# Patient Record
Sex: FEMALE | Race: WHITE | Employment: OTHER | URBAN - METROPOLITAN AREA
[De-identification: names, ages, dates, MRNs, and addresses within clinical notes are randomized per-mention and may not be internally consistent; named-entity substitution may affect disease eponyms.]

---

## 2017-06-15 ENCOUNTER — TRANSFERRED RECORDS (OUTPATIENT)
Dept: HEALTH INFORMATION MANAGEMENT | Facility: CLINIC | Age: 67
End: 2017-06-15

## 2017-06-16 ENCOUNTER — RADIANT APPOINTMENT (OUTPATIENT)
Dept: GENERAL RADIOLOGY | Facility: CLINIC | Age: 67
End: 2017-06-16
Attending: FAMILY MEDICINE
Payer: COMMERCIAL

## 2017-06-16 ENCOUNTER — OFFICE VISIT (OUTPATIENT)
Dept: FAMILY MEDICINE | Facility: CLINIC | Age: 67
End: 2017-06-16
Payer: COMMERCIAL

## 2017-06-16 VITALS
BODY MASS INDEX: 33.63 KG/M2 | HEART RATE: 72 BPM | DIASTOLIC BLOOD PRESSURE: 78 MMHG | TEMPERATURE: 98 F | OXYGEN SATURATION: 96 % | HEIGHT: 64 IN | WEIGHT: 197 LBS | SYSTOLIC BLOOD PRESSURE: 120 MMHG

## 2017-06-16 VITALS
TEMPERATURE: 98 F | HEIGHT: 64 IN | SYSTOLIC BLOOD PRESSURE: 120 MMHG | DIASTOLIC BLOOD PRESSURE: 78 MMHG | OXYGEN SATURATION: 96 % | BODY MASS INDEX: 33.63 KG/M2 | WEIGHT: 197 LBS | HEART RATE: 72 BPM

## 2017-06-16 DIAGNOSIS — E55.9 VITAMIN D DEFICIENCY: ICD-10-CM

## 2017-06-16 DIAGNOSIS — M54.2 CERVICALGIA: ICD-10-CM

## 2017-06-16 DIAGNOSIS — Z00.00 MEDICARE ANNUAL WELLNESS VISIT, SUBSEQUENT: Primary | ICD-10-CM

## 2017-06-16 DIAGNOSIS — C54.1 ENDOMETRIAL CANCER (H): ICD-10-CM

## 2017-06-16 DIAGNOSIS — E78.5 HYPERLIPIDEMIA LDL GOAL <130: ICD-10-CM

## 2017-06-16 DIAGNOSIS — M54.2 CERVICALGIA: Primary | ICD-10-CM

## 2017-06-16 LAB
ALBUMIN SERPL-MCNC: 3.7 G/DL (ref 3.4–5)
ALP SERPL-CCNC: 46 U/L (ref 40–150)
ALT SERPL W P-5'-P-CCNC: 25 U/L (ref 0–50)
ANION GAP SERPL CALCULATED.3IONS-SCNC: 7 MMOL/L (ref 3–14)
AST SERPL W P-5'-P-CCNC: 19 U/L (ref 0–45)
BILIRUB SERPL-MCNC: 0.4 MG/DL (ref 0.2–1.3)
BUN SERPL-MCNC: 17 MG/DL (ref 7–30)
CALCIUM SERPL-MCNC: 9.1 MG/DL (ref 8.5–10.1)
CHLORIDE SERPL-SCNC: 109 MMOL/L (ref 94–109)
CO2 SERPL-SCNC: 27 MMOL/L (ref 20–32)
CREAT SERPL-MCNC: 0.62 MG/DL (ref 0.52–1.04)
DEPRECATED CALCIDIOL+CALCIFEROL SERPL-MC: 36 UG/L (ref 20–75)
ERYTHROCYTE [DISTWIDTH] IN BLOOD BY AUTOMATED COUNT: 15.7 % (ref 10–15)
GFR SERPL CREATININE-BSD FRML MDRD: ABNORMAL ML/MIN/1.7M2
GLUCOSE SERPL-MCNC: 107 MG/DL (ref 70–99)
HCT VFR BLD AUTO: 42.7 % (ref 35–47)
HGB BLD-MCNC: 13.5 G/DL (ref 11.7–15.7)
MCH RBC QN AUTO: 29.7 PG (ref 26.5–33)
MCHC RBC AUTO-ENTMCNC: 31.6 G/DL (ref 31.5–36.5)
MCV RBC AUTO: 94 FL (ref 78–100)
PLATELET # BLD AUTO: 283 10E9/L (ref 150–450)
POTASSIUM SERPL-SCNC: 4.5 MMOL/L (ref 3.4–5.3)
PROT SERPL-MCNC: 7.5 G/DL (ref 6.8–8.8)
RBC # BLD AUTO: 4.55 10E12/L (ref 3.8–5.2)
SODIUM SERPL-SCNC: 143 MMOL/L (ref 133–144)
WBC # BLD AUTO: 6 10E9/L (ref 4–11)

## 2017-06-16 PROCEDURE — 80061 LIPID PANEL: CPT | Performed by: FAMILY MEDICINE

## 2017-06-16 PROCEDURE — 72040 X-RAY EXAM NECK SPINE 2-3 VW: CPT

## 2017-06-16 PROCEDURE — 85027 COMPLETE CBC AUTOMATED: CPT | Performed by: FAMILY MEDICINE

## 2017-06-16 PROCEDURE — 99397 PER PM REEVAL EST PAT 65+ YR: CPT | Performed by: FAMILY MEDICINE

## 2017-06-16 PROCEDURE — 80053 COMPREHEN METABOLIC PANEL: CPT | Performed by: FAMILY MEDICINE

## 2017-06-16 PROCEDURE — 2894A XR CERVICAL SPINE 1 VW: CPT

## 2017-06-16 PROCEDURE — 36415 COLL VENOUS BLD VENIPUNCTURE: CPT | Performed by: FAMILY MEDICINE

## 2017-06-16 PROCEDURE — 82306 VITAMIN D 25 HYDROXY: CPT | Performed by: FAMILY MEDICINE

## 2017-06-16 PROCEDURE — 99213 OFFICE O/P EST LOW 20 MIN: CPT | Performed by: FAMILY MEDICINE

## 2017-06-16 NOTE — MR AVS SNAPSHOT
After Visit Summary   6/16/2017    Blanquita Trevizo    MRN: [id number]           Patient Information     Date Of Birth          1950        Visit Information        Provider Department      6/16/2017 8:40 AM Weiler, Karen, MD [facility]        Today's Diagnoses     Cervicalgia    -  1       Follow-ups after your visit        Additional Services     [facility]IRLEY PT, HAND, AND CHIROPRACTIC REFERRAL       **This order will print in the [facility] Scheduling Office**    Physical Therapy, Hand Therapy and Chiropractic Care are available through:    *[facility]  *[facility]  *[facility]    Call one number to schedule at any of the above locations: [phone].    Your provider has referred you to: Physical Therapy at [facility] or [facility]    Indication/Reason for Referral: Neck pain and Rt. Achilles tendinitis  Onset of Illness: Several months  Therapy Orders: Evaluate and Treat  Special Programs: None  Special Request: None    Jose Miguel Gillespie      Additional Comments for the Therapist or Chiropractor:     Please be aware that coverage of these services is subject to the terms and limitations of your health insurance plan.  Call member services at your health plan with any benefit or coverage questions.      Please bring the following to your appointment:    *Your personal calendar for scheduling future appointments  *Comfortable clothing                  Who to contact     If you have questions or need follow up information about today's clinic visit or your schedule please contact [facility] directly at [phone].  Normal or non-critical lab and imaging results will be communicated to you by MyChart, letter or phone within 4 business days after the clinic has received the results. If you do not hear from us within 7 days, please contact the clinic through MyChart or phone. If you have a critical or abnormal lab result, we will notify you by  "phone as soon as possible.  Submit refill requests through [employer] or call your pharmacy and they will forward the refill request to us. Please allow 3 business days for your refill to be completed.          Additional Information About Your Visit        RailRunner[employer]orm Information     [employer] gives you secure access to your electronic health record. If you see a primary care provider, you can also send messages to your care team and make appointments. If you have questions, please call your primary care clinic.  If you do not have a primary care provider, please call [phone] and they will assist you.        Care EveryWhere ID     This is your Care EveryWhere ID. This could be used by other organizations to access your [place] medical records  [id number]        Your Vitals Were     Pulse Temperature Height Pulse Oximetry BMI (Body Mass Index)       72 98  F (36.7  C) (Oral) 5' 4\" (1.626 m) 96% 33.81 kg/m2        Blood Pressure from Last 3 Encounters:   06/16/17 120/78   06/16/17 120/78   11/28/16 128/80    Weight from Last 3 Encounters:   06/16/17 197 lb (89.4 kg)   06/16/17 197 lb (89.4 kg)   11/28/16 202 lb (91.6 kg)              We Performed the Following     SHIRLEY PT, HAND, AND CHIROPRACTIC REFERRAL        Primary Care Provider Office Phone # Fax #    Karen Weiler, [phone]       [address] [address]        Equal Access to Services     Kern ValleyMAJOR : Hadii aad ku hadasho Soomaali, waaxda luqadaha, qaybta kaalmada adeegyada, waxay priyanka rust RegaloCardmonica robert . So [facility] [phone].    ATENCIÓN: Si habla español, tiene a lou disposición servicios gratuitos de asistencia lingüística. Heber al [phone].    We comply with applicable federal civil rights laws and [place] laws. We do not discriminate on the basis of race, color, national origin, age, disability sex, sexual orientation or gender identity.            Thank you!     Thank you for choosing " [facility]  for your care. Our goal is always to provide you with excellent care. Hearing back from our patients is one way we can continue to improve our services. Please take a few minutes to complete the written survey that you may receive in the mail after your visit with us. Thank you!             Your Updated Medication List - Protect others around you: Learn how to safely use, store and throw away your medicines at www.disposemymeds.org.          This list is accurate as of: 6/16/17 11:59 PM.  Always use your most recent med list.                   Brand Name Dispense Instructions for use Diagnosis    calcium 500 + D 500-400 MG-UNIT Tabs   Generic drug:  Calcium Carb-Cholecalciferol           clobetasol 0.05 % ointment    TEMOVATE    30 g    Apply to rash on feet two times per day for 10 days then once per day.  Do not apply to face.    Dermatitis       vitamin E 400 UNIT capsule     30 capsule    Strength unknown at this time - patient uses what is cost effective.
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
## 2017-06-16 NOTE — PROGRESS NOTES
SUBJECTIVE:                                                            Blanquita Trevizo is a 66 year old female who presents for Preventive Visit.    Are you in the first 12 months of your Medicare Part B coverage?  No    Pt reports she is doing well. Pt reports she sleeps well, but wishes her energy was higher, stays up late playing Bridge. Pt saw cancer doctor yesterday and everything was normal, was told to see a gynecologist. Pt takes 2 gummy bears each day for vitamin D and fish oil each day as well. No chest pain or SOB, nonsmoker, no palpitations or abdominal pain. Pt has had no falls and is able to take care of herself at home.      Healthy Habits:    Do you get at least three servings of calcium containing foods daily (dairy, green leafy vegetables, etc.)? yes and taking vitamin D     Amount of exercise or daily activities, outside of work: Swimming sometimes - 40 -45 minutes per session     Problems taking medications regularly No    Medication side effects: No    Have you had an eye exam in the past two years? yes    Do you see a dentist twice per year? yes    Do you have sleep apnea, excessive snoring or daytime drowsiness?no    COGNITIVE SCREEN  1) Repeat 3 items (Banana, Sunrise, Chair)    2) Clock draw: NORMAL  3) 3 item recall: Recalls 3 objects  Results: 3 items recalled: COGNITIVE IMPAIRMENT LESS LIKELY    Mini-CogTM Copyright NEELIMA Madrid. Licensed by the author for use in [facility]; reprinted with permission (andrew@.[facility]). All rights reserved.        Reviewed and updated as needed this visit by clinical staff  Tobacco  Allergies  Meds  Med Hx  Surg Hx  Fam Hx  Soc Hx        Reviewed and updated as needed this visit by Provider        Social History   Substance Use Topics     Smoking status: Never Smoker     Smokeless tobacco: Never Used     Alcohol use Yes      Comment: 2-3 drinks per week        The patient does not drink >3 drinks per day nor >7 drinks per week.    Today's  PHQ-2 Score:   PHQ-2 ( 1999 [employer]) 11/28/2016 9/19/2016   Q1: Little interest or pleasure in doing things 0 0   Q2: Feeling down, depressed or hopeless 0 0   PHQ-2 Score 0 0       Do you feel safe in your environment - Yes    Do you have a Health Care Directive?: No: Advance care planning was reviewed with patient; patient declined at this time.    Current providers sharing in care for this patient include:   Patient Care Team:  Weiler, Karen, MD as PCP - General      Hearing impairment: No    Ability to successfully perform activities of daily living: Yes, no assistance needed     Fall risk:       Home safety:  none identified      The following health maintenance items are reviewed in Epic and correct as of today:  Health Maintenance   Topic Date Due     ADVANCE DIRECTIVE PLANNING Q5 YRS  11/30/2016     INFLUENZA VACCINE (SYSTEM ASSIGNED)  09/01/2017     DEXA Q2 YR  09/02/2017     PNEUMOCOCCAL (2 of 2 - PPSV23) 09/08/2017     MAMMO Q1 YR  09/15/2017     FALL RISK ASSESSMENT  09/19/2017     TETANUS Q10 YR  06/24/2018     LIPID MONITORING Q5 YEARS  09/08/2021     COLONOSCOPY Q10 YR  08/25/2026     HEPATITIS C SCREENING  Completed       Mammogram Screening: Patient over age 50, mutual decision to screen reflected in health maintenance.     ROS:  Constitutional, HEENT, cardiovascular, pulmonary, gi and gu systems are negative, except as otherwise noted.    This document serves as a record of the services and decisions personally performed and made by Karen Weiler, MD. It was created on her behalf by Chapito Mills, a trained medical scribe. The creation of this document is based on the provider's statements to the medical scribe.  Chapito Mills 9:45 AM June 16, 2017    Problem list, Medication list, Allergies, and Medical/Social/Surgical histories reviewed in [facility] and updated as appropriate.  OBJECTIVE:                                                            /78  Pulse 72  Temp 98  F (36.7  C) (Oral)  Ht  "1.626 m (5' 4\")  Wt 89.4 kg (197 lb)  SpO2 96%  BMI 33.81 kg/m2 Estimated body mass index is 33.81 kg/(m^2) as calculated from the following:    Height as of this encounter: 1.626 m (5' 4\").    Weight as of this encounter: 89.4 kg (197 lb).  EXAM:   GENERAL APPEARANCE: healthy, alert and no distress  EYES: Eyes grossly normal to inspection, PERRL and conjunctivae and sclerae normal  HENT: ear canals and TM's normal, nose and mouth without ulcers or lesions, oropharynx clear and oral mucous membranes moist  NECK: no adenopathy, no asymmetry, masses, or scars and thyroid normal to palpation  RESP: lungs clear to auscultation - no rales, rhonchi or wheezes  BREAST: normal without masses, tenderness or nipple discharge and no palpable axillary masses or adenopathy  CV: regular rate and rhythm, normal S1 S2, no S3 or S4, no murmur, click or rub, no peripheral edema and peripheral pulses strong  ABDOMEN: soft, nontender, no hepatosplenomegaly, no masses and bowel sounds normal  MS: no musculoskeletal defects are noted and gait is age appropriate without ataxia  SKIN: no suspicious lesions or rashes  NEURO: Normal strength and tone, sensory exam grossly normal, mentation intact and speech normal  PSYCH: mentation appears normal and affect normal/bright    ASSESSMENT / PLAN:                                                              (Z00.00) Medicare annual wellness visit, subsequent  (primary encounter diagnosis)  Comment: Pt is doing well, she has not had any recent falls and is able to take care of herself at home  Plan: CBC with platelets, Comprehensive metabolic         panel (BMP + Alb, Alk Phos, ALT, AST, Total.         Bili, TP), Lipid panel reflex to direct LDL        Labs pending, will follow up with results. Advised pt to follow up in 1 year for annual wellness visit. Ordered mammogram for pt, last one was 9/15/16, pt will schedule appointment. Last colonoscopy was last year (2016).    (C54.1) Endometrial " "cancer, Stage 1A grade 1 + washings  Comment: Pt saw Oncologist yesterday and noted everything was normal, specialist told pt to see a gynecologist for further evaluation.  Plan: Advised pt to see OB/GYN in [place], pt will schedule appointment. Follow up if needed.    (E78.5) Hyperlipidemia LDL goal <130  Comment: Pt is doing well.  Plan: Doing lipid reflex for further analysis, labs pending will follow up with results.    (E55.9) Vitamin D deficiency  Comment: Patient's vitamin D levels were low today, last DEXA was 2 years ago, pt is due for another next year (every 3 years).  Plan: Vitamin D Deficiency        Will check vitamin D levels to evaluate her bones. Labs pending, will follow up with results.     End of Life Planning:  Patient currently has an advanced directive: Yes.  Practitioner is supportive of decision.    COUNSELING:  Reviewed preventive health counseling, as reflected in patient instructions       Regular exercise       Healthy diet/nutrition        Estimated body mass index is 33.81 kg/(m^2) as calculated from the following:    Height as of this encounter: 1.626 m (5' 4\").    Weight as of this encounter: 89.4 kg (197 lb).  Weight management plan: Discussed healthy diet and exercise guidelines and patient will follow up in 12 months in clinic to re-evaluate.   reports that she has never smoked. She has never used smokeless tobacco.      Appropriate preventive services were discussed with this patient, including applicable screening as appropriate for cardiovascular disease, diabetes, osteopenia/osteoporosis, and glaucoma.  As appropriate for age/gender, discussed screening for colorectal cancer, prostate cancer, breast cancer, and cervical cancer. Checklist reviewing preventive services available has been given to the patient.    Reviewed patients plan of care and provided an AVS. The Basic Care Plan (routine screening as documented in Health Maintenance) for Blanquita meets the Care Plan " requirement. This Care Plan has been established and reviewed with the Patient.    Counseling Resources:  ATP IV Guidelines  Pooled Cohorts Equation Calculator  Breast Cancer Risk Calculator  FRAX Risk Assessment  ICSI Preventive Guidelines  Dietary Guidelines for Americans, 2010  USDA's MyPlate  ASA Prophylaxis  Lung CA Screening    The information in this document, created by the medical scribe for me, accurately reflects the services I personally performed and the decisions made by me. I have reviewed and approved this document for accuracy prior to leaving the patient care area.  June 16, 2017 9:45 AM    Karen Weiler, MD  [facility]

## 2017-06-16 NOTE — MR AVS SNAPSHOT
After Visit Summary   6/16/2017    Blanquita Trevizo    MRN: [id number]           Patient Information     Date Of Birth          1950        Visit Information        Provider Department      6/16/2017 9:00 AM Weiler, Karen, MD [facility]        Today's Diagnoses     Medicare annual wellness visit, subsequent    -  1    Endometrial cancer, Stage 1A grade 1 + washings        Hyperlipidemia LDL goal <130        Vitamin D deficiency          Care Instructions      Preventive Health Recommendations    Female Ages 65 +    Yearly exam:     See your health care provider every year in order to  o Review health changes.   o Discuss preventive care.    o Review your medicines if your doctor has prescribed any.      You no longer need a yearly Pap test unless you've had an abnormal Pap test in the past 10 years. If you have vaginal symptoms, such as bleeding or discharge, be sure to talk with your provider about a Pap test.      Every 1 to 2 years, have a mammogram.  If you are over 69, talk with your health care provider about whether or not you want to continue having screening mammograms.      Every 10 years, have a colonoscopy. Or, have a yearly FIT test (stool test). These exams will check for colon cancer.       Have a cholesterol test every 5 years, or more often if your doctor advises it.       Have a diabetes test (fasting glucose) every three years. If you are at risk for diabetes, you should have this test more often.       At age 65, have a bone density scan (DEXA) to check for osteoporosis (brittle bone disease).    Shots:    Get a flu shot each year.    Get a tetanus shot every 10 years.    Talk to your doctor about your pneumonia vaccines. There are now two you should receive - Pneumovax (PPSV 23) and Prevnar (PCV 13).    Talk to your doctor about the shingles vaccine.    Talk to your doctor about the hepatitis B vaccine.    Nutrition:     Eat at least 5 servings of fruits and  vegetables each day.      Eat whole-grain bread, whole-wheat pasta and brown rice instead of white grains and rice.      Talk to your provider about Calcium and Vitamin D.     Lifestyle    Exercise at least 150 minutes a week (30 minutes a day, 5 days a week). This will help you control your weight and prevent disease.      Limit alcohol to one drink per day.      No smoking.       Wear sunscreen to prevent skin cancer.       See your dentist twice a year for an exam and cleaning.      See your eye doctor every 1 to 2 years to screen for conditions such as glaucoma, macular degeneration and cataracts.          Follow-ups after your visit        Who to contact     If you have questions or need follow up information about today's clinic visit or your schedule please contact [facility] directly at [phone].  Normal or non-critical lab and imaging results will be communicated to you by MyChart, letter or phone within 4 business days after the clinic has received the results. If you do not hear from us within 7 days, please contact the clinic through [facility] or phone. If you have a critical or abnormal lab result, we will notify you by phone as soon as possible.  Submit refill requests through [facility] or call your pharmacy and they will forward the refill request to us. Please allow 3 business days for your refill to be completed.          Additional Information About Your Visit        [facility]u Information     [facility] gives you secure access to your electronic health record. If you see a primary care provider, you can also send messages to your care team and make appointments. If you have questions, please call your primary care clinic.  If you do not have a primary care provider, please call [phone] and they will assist you.        Care EveryWhere ID     This is your Care EveryWhere ID. This could be used by other organizations to access your [facility] medical records  [id number]        Your Vitals  "Were     Pulse Temperature Height Pulse Oximetry BMI (Body Mass Index)       72 98  F (36.7  C) (Oral) 5' 4\" (1.626 m) 96% 33.81 kg/m2        Blood Pressure from Last 3 Encounters:   06/16/17 120/78   06/16/17 120/78   11/28/16 128/80    Weight from Last 3 Encounters:   06/16/17 197 lb (89.4 kg)   06/16/17 197 lb (89.4 kg)   11/28/16 202 lb (91.6 kg)              We Performed the Following     CBC with platelets     Comprehensive metabolic panel (BMP + Alb, Alk Phos, ALT, AST, Total. Bili, TP)     Lipid panel reflex to direct LDL     Vitamin D Deficiency        Primary Care Provider Office Phone # Fax #    Karen Weiler, [phone]       [facility] [address] [facility] [address]        Equal Access to Services     TAWNYA JASON : Hadii aad ku hadasho Soomaali, waaxda luqadaha, qaybta kaalmada adeegyada, waxay idiin hayflaquitan emmy davisonn . So [facility] [phone].    ATENCIÓN: Si habla español, tiene a lou disposición servicios gratuitos de asistencia lingüística. Heber al [phone].    We comply with applicable federal civil rights laws and [place] laws. We do not discriminate on the basis of race, color, national origin, age, disability sex, sexual orientation or gender identity.            Thank you!     Thank you for choosing [facility]  for your care. Our goal is always to provide you with excellent care. Hearing back from our patients is one way we can continue to improve our services. Please take a few minutes to complete the written survey that you may receive in the mail after your visit with us. Thank you!             Your Updated Medication List - Protect others around you: Learn how to safely use, store and throw away your medicines at www.disposemymeds.org.          This list is accurate as of: 6/16/17 11:59 PM.  Always use your most recent med list.                   Brand Name Dispense Instructions for use Diagnosis    calcium 500 + D 500-400 MG-UNIT " Tabs   Generic drug:  Calcium Carb-Cholecalciferol           clobetasol 0.05 % ointment    TEMOVATE    30 g    Apply to rash on feet two times per day for 10 days then once per day.  Do not apply to face.    Dermatitis       vitamin E 400 UNIT capsule     30 capsule    Strength unknown at this time - patient uses what is cost effective.

## 2017-06-22 ENCOUNTER — THERAPY VISIT (OUTPATIENT)
Dept: PHYSICAL THERAPY | Facility: CLINIC | Age: 67
End: 2017-06-22

## 2017-06-22 DIAGNOSIS — M76.60 PAIN IN ACHILLES TENDON: Primary | ICD-10-CM

## 2017-06-22 DIAGNOSIS — M54.2 CERVICALGIA: ICD-10-CM

## 2017-06-22 PROCEDURE — G8982 BODY POS GOAL STATUS: HCPCS | Mod: GP | Performed by: PHYSICAL THERAPIST

## 2017-06-22 PROCEDURE — G8981 BODY POS CURRENT STATUS: HCPCS | Mod: GP | Performed by: PHYSICAL THERAPIST

## 2017-06-22 PROCEDURE — 97161 PT EVAL LOW COMPLEX 20 MIN: CPT | Mod: GP | Performed by: PHYSICAL THERAPIST

## 2017-06-22 PROCEDURE — 97110 THERAPEUTIC EXERCISES: CPT | Mod: GP | Performed by: PHYSICAL THERAPIST
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
## 2017-06-22 NOTE — MR AVS SNAPSHOT
After Visit Summary   6/22/2017    Blanquita Trevizo    MRN: [id number]           Patient Information     Date Of Birth          1950        Visit Information        Provider Department      6/22/2017 5:40 PM Omar Pérez PT [facility]        Today's Diagnoses     Pain in Achilles tendon    -  1    Cervicalgia           Follow-ups after your visit        Who to contact     If you have questions or need follow up information about today's clinic visit or your schedule please contact [facility] directly at [phone].  Normal or non-critical lab and imaging results will be communicated to you by [facility], letter or phone within 4 business days after the clinic has received the results. If you do not hear from us within 7 days, please contact the clinic through [facility] or phone. If you have a critical or abnormal lab result, we will notify you by phone as soon as possible.  Submit refill requests through [employer] or call your pharmacy and they will forward the refill request to us. Please allow 3 business days for your refill to be completed.          Additional Information About Your Visit        MyChart Information     [employer] gives you secure access to your electronic health record. If you see a primary care provider, you can also send messages to your care team and make appointments. If you have questions, please call your primary care clinic.  If you do not have a primary care provider, please call [phone] and they will assist you.        Care EveryWhere ID     This is your Care EveryWhere ID. This could be used by other organizations to access your [facility] medical records  [id number]         Blood Pressure from Last 3 Encounters:   06/16/17 120/78   06/16/17 120/78   11/28/16 128/80    Weight from Last 3 Encounters:   06/16/17 89.4 kg (197 lb)   06/16/17 89.4 kg (197 lb)   11/28/16 91.6 kg (202 lb)              We Performed  the Following     HC PT EVAL, LOW COMPLEXITY     SHIRLEY CERT REPORT     THERAPEUTIC EXERCISES        Primary Care Provider Office Phone # Fax #    Karen Weiler, [phone]       [facility] [address] [facility] [address]        Equal Access to Services     TAWNYA JASON : Hadii aad ku hadasho Soomaali, waaxda luqadaha, qaybta kaalmada adeegyada, waxay idiin hayaan adeeg khfei lalianetn ah. So [facility] [phone].    ATENCIÓN: Si habla español, tiene a lou disposición servicios gratuitos de asistencia lingüística. Llame al [phone].    We comply with applicable federal civil rights laws and [place] laws. We do not discriminate on the basis of race, color, national origin, age, disability sex, sexual orientation or gender identity.            Thank you!     Thank you for choosing [facility]  for your care. Our goal is always to provide you with excellent care. Hearing back from our patients is one way we can continue to improve our services. Please take a few minutes to complete the written survey that you may receive in the mail after your visit with us. Thank you!             Your Updated Medication List - Protect others around you: Learn how to safely use, store and throw away your medicines at www.disposemymeds.org.          This list is accurate as of: 6/22/17 11:59 PM.  Always use your most recent med list.                   Brand Name Dispense Instructions for use Diagnosis    calcium 500 + D 500-400 MG-UNIT Tabs   Generic drug:  Calcium Carb-Cholecalciferol           clobetasol 0.05 % ointment    TEMOVATE    30 g    Apply to rash on feet two times per day for 10 days then once per day.  Do not apply to face.    Dermatitis       vitamin E 400 UNIT capsule     30 capsule    Strength unknown at this time - patient uses what is cost effective.

## 2017-06-23 PROBLEM — M76.60 PAIN IN ACHILLES TENDON: Status: ACTIVE | Noted: 2017-06-23

## 2017-06-23 PROBLEM — M54.2 CERVICALGIA: Status: ACTIVE | Noted: 2017-06-23

## 2017-06-28 LAB
CHOLEST SERPL-MCNC: 222 MG/DL
HDLC SERPL-MCNC: 68 MG/DL
LDLC SERPL CALC-MCNC: 136 MG/DL
NONHDLC SERPL-MCNC: 154 MG/DL
TRIGL SERPL-MCNC: 92 MG/DL

## 2017-06-29 ENCOUNTER — TELEPHONE (OUTPATIENT)
Dept: FAMILY MEDICINE | Facility: CLINIC | Age: 67
End: 2017-06-29

## 2017-06-29 ENCOUNTER — MEDICAL CORRESPONDENCE (OUTPATIENT)
Dept: HEALTH INFORMATION MANAGEMENT | Facility: CLINIC | Age: 67
End: 2017-06-29

## 2017-07-06 ENCOUNTER — TELEPHONE (OUTPATIENT)
Dept: FAMILY MEDICINE | Facility: CLINIC | Age: 67
End: 2017-07-06

## 2017-07-06 NOTE — TELEPHONE ENCOUNTER
Date Forms was received: July 6, 2017    Forms received by: Fax    Last office visit: 6/16/17    Purpose of Form:  SHIRLEY    When the form is due:  ASAP    How the form needs to be returned for patient:  Fax    Form currently placed  KW in box

## 2017-10-12 ENCOUNTER — HOSPITAL ENCOUNTER (OUTPATIENT)
Dept: MAMMOGRAPHY | Facility: CLINIC | Age: 67
Discharge: HOME | End: 2017-10-12
Attending: FAMILY MEDICINE | Admitting: FAMILY MEDICINE
Payer: MEDICARE

## 2017-10-12 DIAGNOSIS — Z12.31 VISIT FOR SCREENING MAMMOGRAM: ICD-10-CM

## 2017-10-12 PROCEDURE — 77063 BREAST TOMOSYNTHESIS BI: CPT

## 2017-10-12 NOTE — PROGRESS NOTES
Dear Blanquita,    Your recent mammogram was normal. Annual mammograms are recommended, so you will be due again in October 2018.  You may receive a separate result letter in the mail from the imaging center.    Please contact the clinic if you have additional questions.  Thank you.    Sincerely,    Sherlyn Mills PA-C  Physician extender for Dr. Karen Weiler

## 2017-12-18 ENCOUNTER — OFFICE VISIT (OUTPATIENT)
Dept: FAMILY MEDICINE | Facility: CLINIC | Age: 67
End: 2017-12-18
Payer: COMMERCIAL

## 2017-12-18 VITALS
WEIGHT: 196 LBS | HEART RATE: 81 BPM | OXYGEN SATURATION: 97 % | TEMPERATURE: 97.5 F | HEIGHT: 64 IN | SYSTOLIC BLOOD PRESSURE: 118 MMHG | BODY MASS INDEX: 33.46 KG/M2 | DIASTOLIC BLOOD PRESSURE: 72 MMHG

## 2017-12-18 DIAGNOSIS — J06.9 VIRAL UPPER RESPIRATORY INFECTION: ICD-10-CM

## 2017-12-18 DIAGNOSIS — B96.89 BACTERIAL CONJUNCTIVITIS OF BOTH EYES: Primary | ICD-10-CM

## 2017-12-18 DIAGNOSIS — H10.9 BACTERIAL CONJUNCTIVITIS OF BOTH EYES: Primary | ICD-10-CM

## 2017-12-18 LAB
DEPRECATED S PYO AG THROAT QL EIA: NORMAL
SPECIMEN SOURCE: NORMAL

## 2017-12-18 PROCEDURE — 87081 CULTURE SCREEN ONLY: CPT | Performed by: PHYSICIAN ASSISTANT

## 2017-12-18 PROCEDURE — 99213 OFFICE O/P EST LOW 20 MIN: CPT | Performed by: PHYSICIAN ASSISTANT

## 2017-12-18 PROCEDURE — 87880 STREP A ASSAY W/OPTIC: CPT | Performed by: PHYSICIAN ASSISTANT

## 2017-12-18 RX ORDER — OMEGA-3-ACID ETHYL ESTERS 1 G/1
2 CAPSULE, LIQUID FILLED ORAL 2 TIMES DAILY
COMMUNITY
End: 2018-07-30

## 2017-12-18 RX ORDER — ERYTHROMYCIN 5 MG/G
1 OINTMENT OPHTHALMIC AT BEDTIME
Qty: 1 TUBE | Refills: 0 | Status: SHIPPED | OUTPATIENT
Start: 2017-12-18 | End: 2018-02-15

## 2017-12-18 NOTE — NURSING NOTE
"Chief Complaint   Patient presents with     Cold Symptoms       Initial /72 (BP Location: Right arm, Patient Position: Sitting, Cuff Size: Adult Regular)  Pulse 81  Temp 97.5  F (36.4  C) (Oral)  Ht 5' 4\" (1.626 m)  Wt 196 lb (88.9 kg)  SpO2 97%  BMI 33.64 kg/m2 Estimated body mass index is 33.64 kg/(m^2) as calculated from the following:    Height as of this encounter: 5' 4\" (1.626 m).    Weight as of this encounter: 196 lb (88.9 kg).  Medication Reconciliation: complete   Nasra Locke MA    " Yes No

## 2017-12-18 NOTE — MR AVS SNAPSHOT
"              After Visit Summary   12/18/2017    Blanquita Trevizo    MRN: [id number]           Patient Information     Date Of Birth          1950        Visit Information        Provider Department      12/18/2017 8:00 AM Carmela Vance PA-C [facility] [place]        Today's Diagnoses     Bacterial conjunctivitis of both eyes    -  1    Viral upper respiratory infection           Follow-ups after your visit        Who to contact     If you have questions or need follow up information about today's clinic visit or your schedule please contact [facility] [place] directly at [phone].  Normal or non-critical lab and imaging results will be communicated to you by [employer], letter or phone within 4 business days after the clinic has received the results. If you do not hear from us within 7 days, please contact the clinic through [employer] or phone. If you have a critical or abnormal lab result, we will notify you by phone as soon as possible.  Submit refill requests through [employer] or call your pharmacy and they will forward the refill request to us. Please allow 3 business days for your refill to be completed.          Additional Information About Your Visit        MyChart Information     [employer] gives you secure access to your electronic health record. If you see a primary care provider, you can also send messages to your care team and make appointments. If you have questions, please call your primary care clinic.  If you do not have a primary care provider, please call [phone] and they will assist you.        Care EveryWhere ID     This is your Care EveryWhere ID. This could be used by other organizations to access your [place] medical records  [id number]        Your Vitals Were     Pulse Temperature Height Pulse Oximetry BMI (Body Mass Index)       81 97.5  F (36.4  C) (Oral) 5' 4\" (1.626 m) 97% 33.64 kg/m2        Blood Pressure from Last 3 Encounters:   12/18/17 118/72   06/16/17 " 120/78   06/16/17 120/78    Weight from Last 3 Encounters:   12/18/17 196 lb (88.9 kg)   06/16/17 197 lb (89.4 kg)   06/16/17 197 lb (89.4 kg)              We Performed the Following     Beta strep group A culture     Strep, Rapid Screen          Today's Medication Changes          These changes are accurate as of: 12/18/17 12:14 PM.  If you have any questions, ask your nurse or doctor.               Start taking these medicines.        Dose/Directions    erythromycin ophthalmic ointment   Commonly known as:  ROMYCIN   Used for:  Bacterial conjunctivitis of both eyes   Started by:  Carmela Vance PA-C        Dose:  1 Application   Place 1 Application into both eyes At Bedtime   Quantity:  1 Tube   Refills:  0            Where to get your medicines      These medications were sent to [id number] - [place], [place] - [address]  [address], [place] [address]     Phone:  [phone]     erythromycin ophthalmic ointment                Primary Care Provider Office Phone # Fax #    Karen Weiler, [phone] [address]  [facility] [address]        Equal Access to Services     [facility] AH: Hadii aad ku hadasho Soomaali, waaxda luqadaha, qaybta kaalmada adeegyada, lily mathew hayflaquitan emmy robert . So [facility] [phone].    ATENCIÓN: Si habla español, tiene a lou disposición servicios gratuitos de asistencia lingüística. Llame al [phone].    We comply with applicable federal civil rights laws and [place] laws. We do not discriminate on the basis of race, color, national origin, age, disability, sex, sexual orientation, or gender identity.            Thank you!     Thank you for choosing [facility]  for your care. Our goal is always to provide you with excellent care. Hearing back from our patients is one way we can continue to improve our services. Please take a few minutes to complete the written survey that you may receive in the mail after  your visit with us. Thank you!             Your Updated Medication List - Protect others around you: Learn how to safely use, store and throw away your medicines at www.disposemymeds.org.          This list is accurate as of: 12/18/17 12:14 PM.  Always use your most recent med list.                   Brand Name Dispense Instructions for use Diagnosis    calcium 500 + D 500-400 MG-UNIT Tabs   Generic drug:  Calcium Carb-Cholecalciferol           clobetasol 0.05 % ointment    TEMOVATE    30 g    Apply to rash on feet two times per day for 10 days then once per day.  Do not apply to face.    Dermatitis       erythromycin ophthalmic ointment    ROMYCIN    1 Tube    Place 1 Application into both eyes At Bedtime    Bacterial conjunctivitis of both eyes       omega-3 acid ethyl esters 1 G capsule    Lovaza     Take 2 g by mouth 2 times daily        VITAMIN D (CHOLECALCIFEROL) PO      Take by mouth daily        vitamin E 400 UNIT capsule     30 capsule    Strength unknown at this time - patient uses what is cost effective.

## 2017-12-20 LAB
BACTERIA SPEC CULT: NORMAL
SPECIMEN SOURCE: NORMAL

## 2018-02-06 ENCOUNTER — OFFICE VISIT (OUTPATIENT)
Dept: FAMILY MEDICINE | Facility: CLINIC | Age: 68
End: 2018-02-06
Payer: COMMERCIAL

## 2018-02-06 VITALS
WEIGHT: 192 LBS | HEART RATE: 70 BPM | BODY MASS INDEX: 32.78 KG/M2 | OXYGEN SATURATION: 98 % | SYSTOLIC BLOOD PRESSURE: 132 MMHG | HEIGHT: 64 IN | TEMPERATURE: 97.9 F | DIASTOLIC BLOOD PRESSURE: 74 MMHG

## 2018-02-06 DIAGNOSIS — S76.312A LEFT HAMSTRING MUSCLE STRAIN, INITIAL ENCOUNTER: Primary | ICD-10-CM

## 2018-02-06 PROCEDURE — 99213 OFFICE O/P EST LOW 20 MIN: CPT | Performed by: PHYSICIAN ASSISTANT

## 2018-02-06 NOTE — PATIENT INSTRUCTIONS
Ibuprofen:  600 mg three times a day.   Ice and/or heat only 20 minutes at a time for either one.   Referral for physical therapy done today, please followup with them if not improving.      Hamstring Stretch (Flexibility)    1. Sit on the floor with your right leg straight in front of you. Bend your left leg so the sole of your foot rests against the inside of your right thigh.  2. Reach toward your ankle. Keep your knee, neck, and back straight. Feel the stretch in the back of your thigh.  3. Hold for 30 to 60 seconds. Repeat 2 times, or as instructed.  4. Switch legs and repeat.  5. Repeat this exercise 3 times per day, or as instructed.     Tip: Don t bounce while you re stretching.   Date Last Reviewed: 3/10/2016    4125-0527 [employer]. [address] [place] [address]. All rights reserved. This information is not intended as a substitute for professional medical care. Always follow your healthcare professional's instructions.        Hamstring Curl (Strength)    This exercise is for an injured right knee. Switch sides if the injury is to your left knee.  6. Tie the ends of an elastic exercise band or tubing into a large, strong knot. Close the door on the elastic band, so the knot is on one side and the loop of the band is on the other. The elastic band should be close to the floor. You should be on the side of the door with the loop.  7. Sit in a chair facing the closed door. Slip the loop of the elastic exercise band around the heel of your right leg.  8. Slowly pull the elastic band backward along the floor with your heel. Stop when you can t pull it any farther. Hold in place for 10 seconds. Slowly return your leg to the starting position.  9. Repeat 10 times, or as instructed.     Safety tip: Take it slowly. If you do too much too soon, you can create new knee problems, or reinjure your knee.   Date Last Reviewed: 3/10/2016    4937-0369 [employer]. [address] [place] [address]  [place] [address]. All rights reserved. This information is not intended as a substitute for professional medical care. Always follow your healthcare professional's instructions.

## 2018-02-06 NOTE — PROGRESS NOTES
"  SUBJECTIVE:                                                    Blanquita Trevizo is a 67 year old female who presents to clinic today for the following health issues:    Joint Pain    Onset: this morning, 7 AM    Description:   Location: left hamstring  Character: Dull ache    Intensity: moderate    Progression of Symptoms: worse    Accompanying Signs & Symptoms:  Other symptoms: none    History:   Previous similar pain: no       Precipitating factors:   Trauma or overuse: no     Alleviating factors:  Improved by: rest/inactivity    Therapies Tried and outcome: Aleve, one tab at 9am    Around 7am today the patient was lunging forward, reaching for her grandchild and felt an instance of pain to her posterior left thigh. She describes the pain as a pulled hamstring. The pain was mild initially but now has worsened without movement throughout the day. At rest she rates the pain 1/10 but becomes 8/10 with walking. She has never injured this leg before and denies any redness, swelling, bruising, numbness, or tingling.     Problem list and histories reviewed & adjusted, as indicated.  Additional history: as documented    ROS:  Constitutional, HEENT, cardiovascular, pulmonary, GI, , musculoskeletal, neuro, skin, endocrine and psych systems are negative, except as otherwise noted.    OBJECTIVE:                                                    /74 (BP Location: Left arm, Patient Position: Chair, Cuff Size: Adult Large)  Pulse 70  Temp 97.9  F (36.6  C) (Oral)  Ht 5' 4\" (1.626 m)  Wt 192 lb (87.1 kg)  SpO2 98%  Breastfeeding? No  BMI 32.96 kg/m2  Body mass index is 32.96 kg/(m^2).  GENERAL: healthy, alert and no distress  MS: Left leg: pain to left hamstring with ambulation and extension, 4/5 strength with extension of left leg, 5/5 with flexion, full range of motion of left hip and knee, no tenderness to palpation, swelling, redness, or ecchymosis   PSYCH: mentation appears normal, affect " normal/bright    Diagnostic Test Results:  none      ASSESSMENT/PLAN:                                                      Blanquita was seen today for musculoskeletal problem.    Diagnoses and all orders for this visit:    Left hamstring muscle strain, initial encounter  Recommended supportive cares including: Ibuprofen 600mg TID, alternating ice and heat for 20 min at a time, and gave patient exercises to try at home. We discussed physical therapy and patient would like to try this as well. Advised patient to follow-up if pain worsens or not improving in 1-2 weeks.   -     PHYSICAL THERAPY REFERRAL    See Patient Instructions    Karin DENSON acted as a student for me today and accurately reflected my words and actions.    I agree with above History, Review of Systems, Physical exam and Plan.  I have reviewed the content of the documentation and have edited it as needed. I have personally performed the services documented here and the documentation accurately represents those services and the decisions I have made.            Annette Jasmine PA-C    [facility] [place]

## 2018-02-06 NOTE — MR AVS SNAPSHOT
After Visit Summary   2/6/2018    Blanquita Trevizo    MRN: [id number]           Patient Information     Date Of Birth          1950        Visit Information        Provider Department      2/6/2018 4:20 PM Annette Jasmine, [facility] [place]        Today's Diagnoses     Left hamstring muscle strain, initial encounter    -  1      Care Instructions      Ibuprofen:  600 mg three times a day.   Ice and/or heat only 20 minutes at a time for either one.   Referral for physical therapy done today, please followup with them if not improving.      Hamstring Stretch (Flexibility)    1. Sit on the floor with your right leg straight in front of you. Bend your left leg so the sole of your foot rests against the inside of your right thigh.  2. Reach toward your ankle. Keep your knee, neck, and back straight. Feel the stretch in the back of your thigh.  3. Hold for 30 to 60 seconds. Repeat 2 times, or as instructed.  4. Switch legs and repeat.  5. Repeat this exercise 3 times per day, or as instructed.     Tip: Don t bounce while you re stretching.   Date Last Reviewed: 3/10/2016    3437-2696 [employer]. [address] [place], [place] [address]. All rights reserved. This information is not intended as a substitute for professional medical care. Always follow your healthcare professional's instructions.        Hamstring Curl (Strength)    This exercise is for an injured right knee. Switch sides if the injury is to your left knee.  6. Tie the ends of an elastic exercise band or tubing into a large, strong knot. Close the door on the elastic band, so the knot is on one side and the loop of the band is on the other. The elastic band should be close to the floor. You should be on the side of the door with the loop.  7. Sit in a chair facing the closed door. Slip the loop of the elastic exercise band around the heel of your right leg.  8. Slowly pull the elastic band backward along the  "floor with your heel. Stop when you can t pull it any farther. Hold in place for 10 seconds. Slowly return your leg to the starting position.  9. Repeat 10 times, or as instructed.     Safety tip: Take it slowly. If you do too much too soon, you can create new knee problems, or reinjure your knee.   Date Last Reviewed: 3/10/2016    2107-5985 [employer]. [address] [place] [address], [place] [address]. All rights reserved. This information is not intended as a substitute for professional medical care. Always follow your healthcare professional's instructions.                Follow-ups after your visit        Additional Services     PHYSICAL THERAPY REFERRAL       *This therapy referral will be filtered to a centralized scheduling office at [facility] and the patient will receive a call to schedule an appointment at a [place] location most convenient for them. *     [facility] provides Physical Therapy evaluation and treatment and many specialty services across the [place] system.  If requesting a specialty program, please choose from the list below.    If you have not heard from the scheduling office within 2 business days, please call [phone] for all locations, with the exception of [place], please call [phone].  Treatment: Evaluation & Treatment  Special Instructions/Modalities: None  Special Programs: None    Please be aware that coverage of these services is subject to the terms and limitations of your health insurance plan.  Call member services at your health plan with any benefit or coverage questions.      **Note to Provider:  If you are referring outside of [place] for the therapy appointment, please list the name of the location in the \"special instructions\" above, print the referral and give to the patient to schedule the appointment.                  Who to contact     If you have questions or need follow up information about today's clinic " "visit or your schedule please contact [facility] directly at [phone].  Normal or non-critical lab and imaging results will be communicated to you by MyChart, letter or phone within 4 business days after the clinic has received the results. If you do not hear from us within 7 days, please contact the clinic through [employer] or phone. If you have a critical or abnormal lab result, we will notify you by phone as soon as possible.  Submit refill requests through [employer] or call your pharmacy and they will forward the refill request to us. Please allow 3 business days for your refill to be completed.          Additional Information About Your Visit        [employer] Information     [employer] gives you secure access to your electronic health record. If you see a primary care provider, you can also send messages to your care team and make appointments. If you have questions, please call your primary care clinic.  If you do not have a primary care provider, please call [phone] and they will assist you.        Care EveryWhere ID     This is your Care EveryWhere ID. This could be used by other organizations to access your [place] medical records  [id number]        Your Vitals Were     Pulse Temperature Height Pulse Oximetry Breastfeeding? BMI (Body Mass Index)    70 97.9  F (36.6  C) (Oral) 5' 4\" (1.626 m) 98% No 32.96 kg/m2       Blood Pressure from Last 3 Encounters:   02/06/18 132/74   12/18/17 118/72   06/16/17 120/78    Weight from Last 3 Encounters:   02/06/18 192 lb (87.1 kg)   12/18/17 196 lb (88.9 kg)   06/16/17 197 lb (89.4 kg)              We Performed the Following     PHYSICAL THERAPY REFERRAL        Primary Care Provider Office Phone # Fax #    Karen Weiler, [phone] [address]  [place] [address]        Equal Access to Services     TAWNYA JASON AH: Hadii estefania auo Soomaali, waaxda luqadaha, qaybta kaalmada adeegyada, lily robert " ah. So [facility] [phone].    ATENCIÓN: Si susan page, tiene a lou disposición servicios gratuitos de asistencia lingüística. Heber al [phone].    We comply with applicable federal civil rights laws and [place] laws. We do not discriminate on the basis of race, color, national origin, age, disability, sex, sexual orientation, or gender identity.            Thank you!     Thank you for choosing [facility]  for your care. Our goal is always to provide you with excellent care. Hearing back from our patients is one way we can continue to improve our services. Please take a few minutes to complete the written survey that you may receive in the mail after your visit with us. Thank you!             Your Updated Medication List - Protect others around you: Learn how to safely use, store and throw away your medicines at www.disposemymeds.org.          This list is accurate as of 2/6/18  4:49 PM.  Always use your most recent med list.                   Brand Name Dispense Instructions for use Diagnosis    clobetasol 0.05 % ointment    TEMOVATE    30 g    Apply to rash on feet two times per day for 10 days then once per day.  Do not apply to face.    Dermatitis       erythromycin ophthalmic ointment    ROMYCIN    1 Tube    Place 1 Application into both eyes At Bedtime    Bacterial conjunctivitis of both eyes       omega-3 acid ethyl esters 1 G capsule    Lovaza     Take 2 g by mouth 2 times daily        VITAMIN D (CHOLECALCIFEROL) PO      Take by mouth daily        vitamin E 400 UNIT capsule     30 capsule    Strength unknown at this time - patient uses what is cost effective.

## 2018-02-06 NOTE — NURSING NOTE
"Chief Complaint   Patient presents with     Musculoskeletal Problem       Initial /74 (BP Location: Left arm, Patient Position: Chair, Cuff Size: Adult Large)  Pulse 70  Temp 97.9  F (36.6  C) (Oral)  Ht 5' 4\" (1.626 m)  Wt 192 lb (87.1 kg)  SpO2 98%  Breastfeeding? No  BMI 32.96 kg/m2 Estimated body mass index is 32.96 kg/(m^2) as calculated from the following:    Height as of this encounter: 5' 4\" (1.626 m).    Weight as of this encounter: 192 lb (87.1 kg).  Medication Reconciliation: complete   Csaba Mlnarik CMA    "

## 2018-02-15 ENCOUNTER — OFFICE VISIT (OUTPATIENT)
Dept: FAMILY MEDICINE | Facility: CLINIC | Age: 68
End: 2018-02-15
Payer: COMMERCIAL

## 2018-02-15 VITALS
TEMPERATURE: 98.4 F | HEIGHT: 64 IN | SYSTOLIC BLOOD PRESSURE: 124 MMHG | HEART RATE: 92 BPM | DIASTOLIC BLOOD PRESSURE: 78 MMHG | WEIGHT: 199 LBS | BODY MASS INDEX: 33.97 KG/M2 | OXYGEN SATURATION: 97 %

## 2018-02-15 DIAGNOSIS — J01.00 ACUTE NON-RECURRENT MAXILLARY SINUSITIS: Primary | ICD-10-CM

## 2018-02-15 PROCEDURE — 99213 OFFICE O/P EST LOW 20 MIN: CPT | Performed by: PHYSICIAN ASSISTANT

## 2018-02-15 NOTE — PROGRESS NOTES
"  SUBJECTIVE:   Blanquita Trevizo is a 67 year old female who presents to clinic today for the following health issues:    The patient presents to the clinic for evaluation of sinus pressure that has been present for the past week. She has maxillary pressure, tooth pain, ear pain, post nasal drip, and intermittent coughing. She has been taking Mucinex, Claritin, flonase for two days without relief.   Denies fevers or significant rhinorrhea.  Denies headache.  No nausea/vomiting.      Problem list and histories reviewed & adjusted, as indicated.  Additional history: as documented      Reviewed and updated as needed this visit by clinical staff  Tobacco  Allergies  Meds  Problems  Med Hx  Surg Hx  Fam Hx  Soc Hx        Reviewed and updated as needed this visit by Provider  Tobacco  Allergies  Meds  Problems  Med Hx  Surg Hx  Fam Hx  Soc Hx          ROS:  Constitutional, HEENT, cardiovascular, pulmonary, GI, , musculoskeletal, neuro, skin, endocrine and psych systems are negative, except as otherwise noted.    This document serves as a record of the services and decisions personally performed and made by Nasra Herron PA-C. It was created on his behalf by Joanna Hdez, a trained medical scribe. The creation of this document is based on the provider's statements to the medical scribe.  Joanna Hdez 9:14 AM 2/15/2018  OBJECTIVE:   /78  Pulse 92  Temp 98.4  F (36.9  C) (Tympanic)  Ht 5' 4\" (1.626 m)  Wt 199 lb (90.3 kg)  SpO2 97%  BMI 34.16 kg/m2  Body mass index is 34.16 kg/(m^2).  GENERAL: healthy, alert and no distress  EYES: Eyes grossly normal to inspection, PERRL and conjunctivae and sclerae normal  HENT: clear effusion behind TM without erythema bilaterally, maxillary and ethmoid sinus tenderness, otherwise ear canals and TM's normal, nose and mouth without ulcers or lesions  NECK: no adenopathy, no asymmetry, masses, or scars and thyroid normal to palpation  RESP: lungs clear to " auscultation - no rales, rhonchi or wheezes  CV: regular rate and rhythm, normal S1 S2, no S3 or S4, no murmur, click or rub, no peripheral edema and peripheral pulses strong  MS: no gross musculoskeletal defects noted, no edema  SKIN: no suspicious lesions or rashes    Diagnostic Test Results:  none   ASSESSMENT/PLAN:   Blanquita was seen today for sinus problem.    Diagnoses and all orders for this visit:    Acute non-recurrent maxillary sinusitis - Start taking Augmentin BID. Start using Flonase nasal spray BID for two weeks.   -     amoxicillin-clavulanate (AUGMENTIN) 875-125 MG per tablet; Take 1 tablet by mouth 2 times daily for 10 days      The information in this document, created by a scribe for me, accurately reflects the services I personally performed and the decisions made by me. I have reviewed and approved this document for accuracy.    Nasra Herron PA-C  [facility] [place]

## 2018-02-15 NOTE — NURSING NOTE
"Chief Complaint   Patient presents with     Sinus Problem     sinus pressure ~1 week        Initial /78  Pulse 92  Temp 98.4  F (36.9  C) (Tympanic)  Ht 5' 4\" (1.626 m)  Wt 199 lb (90.3 kg)  SpO2 97%  BMI 34.16 kg/m2 Estimated body mass index is 34.16 kg/(m^2) as calculated from the following:    Height as of this encounter: 5' 4\" (1.626 m).    Weight as of this encounter: 199 lb (90.3 kg).  Medication Reconciliation: complete    "

## 2018-02-15 NOTE — MR AVS SNAPSHOT
"              After Visit Summary   2/15/2018    Blanquita Trevizo    MRN: [id number]           Patient Information     Date Of Birth          1950        Visit Information        Provider Department      2/15/2018 9:00 AM Nasra Herron PA-C [facility]        Today's Diagnoses     Acute non-recurrent maxillary sinusitis    -  1       Follow-ups after your visit        Who to contact     If you have questions or need follow up information about today's clinic visit or your schedule please contact [facility] directly at [phone].  Normal or non-critical lab and imaging results will be communicated to you by JumpInhart, letter or phone within 4 business days after the clinic has received the results. If you do not hear from us within 7 days, please contact the clinic through Bioptigent or phone. If you have a critical or abnormal lab result, we will notify you by phone as soon as possible.  Submit refill requests through Toothpick or call your pharmacy and they will forward the refill request to us. Please allow 3 business days for your refill to be completed.          Additional Information About Your Visit        MyChart Information     Toothpick gives you secure access to your electronic health record. If you see a primary care provider, you can also send messages to your care team and make appointments. If you have questions, please call your primary care clinic.  If you do not have a primary care provider, please call [phone] and they will assist you.        Care EveryWhere ID     This is your Care EveryWhere ID. This could be used by other organizations to access your [facility] medical records  [id number]        Your Vitals Were     Pulse Temperature Height Pulse Oximetry BMI (Body Mass Index)       92 98.4  F (36.9  C) (Tympanic) 5' 4\" (1.626 m) 97% 34.16 kg/m2        Blood Pressure from Last 3 Encounters:   02/15/18 124/78   02/06/18 132/74   12/18/17 118/72    Weight " from Last 3 Encounters:   02/15/18 199 lb (90.3 kg)   02/06/18 192 lb (87.1 kg)   12/18/17 196 lb (88.9 kg)              Today, you had the following     No orders found for display         Today's Medication Changes          These changes are accurate as of 2/15/18  9:17 AM.  If you have any questions, ask your nurse or doctor.               Start taking these medicines.        Dose/Directions    amoxicillin-clavulanate 875-125 MG per tablet   Commonly known as:  AUGMENTIN   Used for:  Acute non-recurrent maxillary sinusitis   Started by:  Nasra Herron PA-C        Dose:  1 tablet   Take 1 tablet by mouth 2 times daily for 10 days   Quantity:  20 tablet   Refills:  0            Where to get your medicines      These medications were sent to [facility] [place] [facility] [address]  [address] [address]     Phone:  [phone]     amoxicillin-clavulanate 875-125 MG per tablet                Primary Care Provider Office Phone # Fax #    Karen Weiler, [phone] [address]  [place] [address]        Equal Access to Services     [facility] AH: Hadii aad ku hadasho Soomaali, waaxda luqadaha, qaybta kaalmada adeegyada, waxay nachoin hayflaquitan emmy robert ah. So [facility] [phone].    ATENCIÓN: Si habla español, tiene a lou disposición servicios gratuitos de asistencia lingüística. Porfirioame al [phone].    We comply with applicable federal civil rights laws and [place] laws. We do not discriminate on the basis of race, color, national origin, age, disability, sex, sexual orientation, or gender identity.            Thank you!     Thank you for choosing [facility]  for your care. Our goal is always to provide you with excellent care. Hearing back from our patients is one way we can continue to improve our services. Please take a few minutes to complete the written survey that you may receive in the mail after  your visit with us. Thank you!             Your Updated Medication List - Protect others around you: Learn how to safely use, store and throw away your medicines at www.disposemymeds.org.          This list is accurate as of 2/15/18  9:17 AM.  Always use your most recent med list.                   Brand Name Dispense Instructions for use Diagnosis    amoxicillin-clavulanate 875-125 MG per tablet    AUGMENTIN    20 tablet    Take 1 tablet by mouth 2 times daily for 10 days    Acute non-recurrent maxillary sinusitis       clobetasol 0.05 % ointment    TEMOVATE    30 g    Apply to rash on feet two times per day for 10 days then once per day.  Do not apply to face.    Dermatitis       omega-3 acid ethyl esters 1 G capsule    Lovaza     Take 2 g by mouth 2 times daily        VITAMIN D (CHOLECALCIFEROL) PO      Take by mouth daily        vitamin E 400 UNIT capsule     30 capsule    Strength unknown at this time - patient uses what is cost effective.

## 2018-03-08 ENCOUNTER — TELEPHONE (OUTPATIENT)
Dept: FAMILY MEDICINE | Facility: CLINIC | Age: 68
End: 2018-03-08

## 2018-03-08 DIAGNOSIS — R30.0 DYSURIA: Primary | ICD-10-CM

## 2018-03-08 DIAGNOSIS — N30.01 ACUTE CYSTITIS WITH HEMATURIA: ICD-10-CM

## 2018-03-08 DIAGNOSIS — R30.0 DYSURIA: ICD-10-CM

## 2018-03-08 DIAGNOSIS — R82.90 NONSPECIFIC FINDING ON EXAMINATION OF URINE: Primary | ICD-10-CM

## 2018-03-08 PROCEDURE — 87088 URINE BACTERIA CULTURE: CPT | Performed by: PHYSICIAN ASSISTANT

## 2018-03-08 PROCEDURE — 87186 SC STD MICRODIL/AGAR DIL: CPT | Performed by: PHYSICIAN ASSISTANT

## 2018-03-08 PROCEDURE — 81003 URINALYSIS AUTO W/O SCOPE: CPT | Performed by: PHYSICIAN ASSISTANT

## 2018-03-08 PROCEDURE — 81015 MICROSCOPIC EXAM OF URINE: CPT | Performed by: PHYSICIAN ASSISTANT

## 2018-03-08 PROCEDURE — 87086 URINE CULTURE/COLONY COUNT: CPT | Performed by: PHYSICIAN ASSISTANT

## 2018-03-08 NOTE — TELEPHONE ENCOUNTER
She said that she is not able to do an EVisit because she does not know how to use a computer. Would you rather see her in clininc or ?Phone visit?    Brianna Nagel RN  [employer] workforce

## 2018-03-08 NOTE — TELEPHONE ENCOUNTER
"URINARY TRACT SYMPTOMS  Onset: 4 days    Description:   Painful urination (Dysuria): \"mild discomfort\"  Blood in urine (Hematuria): no   Delay in urine (Hesitency): YES    Intensity: mild    Progression of Symptoms:  worsening    Accompanying Signs & Symptoms:  Fever/chills: no   Flank pain no   Nausea and vomiting: no   Any vaginal symptoms: none  Abdominal/Pelvic Pain: no     History:   History of frequent UTI's: no   History of kidney stones: no   Sexually Active: no   Possibility of pregnancy: No    Therapies Tried and outcome: none     Pt is asking if she can \"stop by and leave a urine sample\".  Pt works until 1600 in [place] and could be here after that.  Pt states that she would be available for an appointment if needed tomorrow.  Pt states that Weiler was her PMD, last saw Herron.    Please advise on CLOTILDE Nagel RN  [employer] workforce      "

## 2018-03-08 NOTE — TELEPHONE ENCOUNTER
Patient notified with providers response below and agrees with plan.  Brianna Nagel RN  [employer] workforce

## 2018-03-08 NOTE — TELEPHONE ENCOUNTER
We can just have her do the lab only this time.  In the future OV would be recommended.      I would recommend deactivating her mychart if she is unable to access it.      Nasra Herron MS, PA-C

## 2018-03-08 NOTE — TELEPHONE ENCOUNTER
She can do a lab only but I would also need her to do an evisit.    Thanks!  Lab orders placed.      Nasra Herron MS, PA-C

## 2018-03-09 LAB
ALBUMIN UR-MCNC: NEGATIVE MG/DL
APPEARANCE UR: CLEAR
BACTERIA #/AREA URNS HPF: ABNORMAL /HPF
BILIRUB UR QL STRIP: NEGATIVE
COLOR UR AUTO: YELLOW
GLUCOSE UR STRIP-MCNC: NEGATIVE MG/DL
HGB UR QL STRIP: NEGATIVE
KETONES UR STRIP-MCNC: NEGATIVE MG/DL
LEUKOCYTE ESTERASE UR QL STRIP: ABNORMAL
MUCOUS THREADS #/AREA URNS LPF: PRESENT /LPF
NITRATE UR QL: NEGATIVE
PH UR STRIP: 5.5 PH (ref 5–7)
RBC #/AREA URNS AUTO: ABNORMAL /HPF
SOURCE: ABNORMAL
SP GR UR STRIP: 1.02 (ref 1–1.03)
UROBILINOGEN UR STRIP-ACNC: 0.2 EU/DL (ref 0.2–1)
WBC #/AREA URNS AUTO: >100 /HPF

## 2018-03-09 RX ORDER — CEPHALEXIN 500 MG/1
500 CAPSULE ORAL 2 TIMES DAILY
Qty: 14 CAPSULE | Refills: 0 | Status: SHIPPED | OUTPATIENT
Start: 2018-03-09 | End: 2018-03-16

## 2018-03-09 NOTE — TELEPHONE ENCOUNTER
Patient called back again, RN unavailable. PT would like another call.     PH: [phone]  OK to leave message    [employer]-Patient Representative

## 2018-03-09 NOTE — TELEPHONE ENCOUNTER
Non detailed message left for patient to return call.   Brianna Nagel, RN  [employer] workforce

## 2018-03-09 NOTE — TELEPHONE ENCOUNTER
Please call pt.  Her urine does appear to have signs of an infection.  Due to this, and her reported symtpoms we can treat her.  I will send over Keflex 500 mg BID x 7 days.  Push fluids.  If not improving or worsening please f/u in clinic.      Nasra Herron, MS, PA-C

## 2018-03-10 LAB
BACTERIA SPEC CULT: ABNORMAL
SPECIMEN SOURCE: ABNORMAL

## 2018-07-30 ENCOUNTER — TELEPHONE (OUTPATIENT)
Dept: BONE DENSITY | Facility: CLINIC | Age: 68
End: 2018-07-30

## 2018-07-30 ENCOUNTER — OFFICE VISIT (OUTPATIENT)
Dept: FAMILY MEDICINE | Facility: CLINIC | Age: 68
End: 2018-07-30
Payer: COMMERCIAL

## 2018-07-30 VITALS
HEIGHT: 64 IN | BODY MASS INDEX: 33.8 KG/M2 | DIASTOLIC BLOOD PRESSURE: 64 MMHG | HEART RATE: 72 BPM | WEIGHT: 198 LBS | OXYGEN SATURATION: 96 % | SYSTOLIC BLOOD PRESSURE: 126 MMHG | TEMPERATURE: 97.2 F

## 2018-07-30 DIAGNOSIS — Z13.0 SCREENING FOR DEFICIENCY ANEMIA: ICD-10-CM

## 2018-07-30 DIAGNOSIS — Z78.0 ASYMPTOMATIC POSTMENOPAUSAL STATUS: ICD-10-CM

## 2018-07-30 DIAGNOSIS — Z23 NEED FOR TDAP VACCINATION: ICD-10-CM

## 2018-07-30 DIAGNOSIS — R73.03 PREDIABETES: ICD-10-CM

## 2018-07-30 DIAGNOSIS — I01.8 OTHER ACUTE RHEUMATIC HEART DISEASE: ICD-10-CM

## 2018-07-30 DIAGNOSIS — Z00.00 MEDICARE ANNUAL WELLNESS VISIT, SUBSEQUENT: Primary | ICD-10-CM

## 2018-07-30 DIAGNOSIS — C54.1 ENDOMETRIAL CANCER (H): ICD-10-CM

## 2018-07-30 DIAGNOSIS — L30.9 DERMATITIS: ICD-10-CM

## 2018-07-30 DIAGNOSIS — Z90.81 S/P SPLENECTOMY: ICD-10-CM

## 2018-07-30 DIAGNOSIS — Z13.220 LIPID SCREENING: ICD-10-CM

## 2018-07-30 DIAGNOSIS — M85.80 OSTEOPENIA, UNSPECIFIED LOCATION: ICD-10-CM

## 2018-07-30 DIAGNOSIS — Z12.31 VISIT FOR SCREENING MAMMOGRAM: ICD-10-CM

## 2018-07-30 DIAGNOSIS — N30.90 CYSTITIS: ICD-10-CM

## 2018-07-30 LAB
ALBUMIN SERPL-MCNC: 3.5 G/DL (ref 3.4–5)
ALBUMIN UR-MCNC: NEGATIVE MG/DL
ALP SERPL-CCNC: 44 U/L (ref 40–150)
ALT SERPL W P-5'-P-CCNC: 27 U/L (ref 0–50)
ANION GAP SERPL CALCULATED.3IONS-SCNC: 6 MMOL/L (ref 3–14)
APPEARANCE UR: CLEAR
AST SERPL W P-5'-P-CCNC: 20 U/L (ref 0–45)
BILIRUB SERPL-MCNC: 0.3 MG/DL (ref 0.2–1.3)
BILIRUB UR QL STRIP: NEGATIVE
BUN SERPL-MCNC: 18 MG/DL (ref 7–30)
CALCIUM SERPL-MCNC: 8.9 MG/DL (ref 8.5–10.1)
CHLORIDE SERPL-SCNC: 107 MMOL/L (ref 94–109)
CHOLEST SERPL-MCNC: 209 MG/DL
CO2 SERPL-SCNC: 31 MMOL/L (ref 20–32)
COLOR UR AUTO: YELLOW
CREAT SERPL-MCNC: 0.59 MG/DL (ref 0.52–1.04)
DEPRECATED CALCIDIOL+CALCIFEROL SERPL-MC: 47 UG/L (ref 20–75)
ERYTHROCYTE [DISTWIDTH] IN BLOOD BY AUTOMATED COUNT: 15.2 % (ref 10–15)
GFR SERPL CREATININE-BSD FRML MDRD: >90 ML/MIN/1.7M2
GLUCOSE SERPL-MCNC: 92 MG/DL (ref 70–99)
GLUCOSE UR STRIP-MCNC: NEGATIVE MG/DL
HBA1C MFR BLD: 5.6 % (ref 0–5.6)
HCT VFR BLD AUTO: 43.2 % (ref 35–47)
HDLC SERPL-MCNC: 57 MG/DL
HGB BLD-MCNC: 13.5 G/DL (ref 11.7–15.7)
HGB UR QL STRIP: NEGATIVE
KETONES UR STRIP-MCNC: NEGATIVE MG/DL
LDLC SERPL CALC-MCNC: 136 MG/DL
LEUKOCYTE ESTERASE UR QL STRIP: NEGATIVE
MCH RBC QN AUTO: 30.1 PG (ref 26.5–33)
MCHC RBC AUTO-ENTMCNC: 31.3 G/DL (ref 31.5–36.5)
MCV RBC AUTO: 96 FL (ref 78–100)
NITRATE UR QL: NEGATIVE
NONHDLC SERPL-MCNC: 152 MG/DL
PH UR STRIP: 7 PH (ref 5–7)
PLATELET # BLD AUTO: 311 10E9/L (ref 150–450)
POTASSIUM SERPL-SCNC: 4.4 MMOL/L (ref 3.4–5.3)
PROT SERPL-MCNC: 7.4 G/DL (ref 6.8–8.8)
RBC # BLD AUTO: 4.49 10E12/L (ref 3.8–5.2)
SODIUM SERPL-SCNC: 144 MMOL/L (ref 133–144)
SOURCE: NORMAL
SP GR UR STRIP: 1.02 (ref 1–1.03)
TRIGL SERPL-MCNC: 78 MG/DL
UROBILINOGEN UR STRIP-ACNC: 1 EU/DL (ref 0.2–1)
WBC # BLD AUTO: 7 10E9/L (ref 4–11)

## 2018-07-30 PROCEDURE — 85027 COMPLETE CBC AUTOMATED: CPT | Performed by: PHYSICIAN ASSISTANT

## 2018-07-30 PROCEDURE — 80061 LIPID PANEL: CPT | Performed by: PHYSICIAN ASSISTANT

## 2018-07-30 PROCEDURE — 90715 TDAP VACCINE 7 YRS/> IM: CPT | Performed by: PHYSICIAN ASSISTANT

## 2018-07-30 PROCEDURE — 82306 VITAMIN D 25 HYDROXY: CPT | Performed by: PHYSICIAN ASSISTANT

## 2018-07-30 PROCEDURE — G0439 PPPS, SUBSEQ VISIT: HCPCS | Performed by: PHYSICIAN ASSISTANT

## 2018-07-30 PROCEDURE — 80053 COMPREHEN METABOLIC PANEL: CPT | Performed by: PHYSICIAN ASSISTANT

## 2018-07-30 PROCEDURE — 83036 HEMOGLOBIN GLYCOSYLATED A1C: CPT | Performed by: PHYSICIAN ASSISTANT

## 2018-07-30 PROCEDURE — 90471 IMMUNIZATION ADMIN: CPT | Performed by: PHYSICIAN ASSISTANT

## 2018-07-30 PROCEDURE — 81003 URINALYSIS AUTO W/O SCOPE: CPT | Performed by: PHYSICIAN ASSISTANT

## 2018-07-30 PROCEDURE — 36415 COLL VENOUS BLD VENIPUNCTURE: CPT | Performed by: PHYSICIAN ASSISTANT

## 2018-07-30 RX ORDER — ASCORBIC ACID 125 MG
TABLET,CHEWABLE ORAL DAILY
COMMUNITY
Start: 2018-07-30

## 2018-07-30 RX ORDER — CLOBETASOL PROPIONATE 0.5 MG/G
OINTMENT TOPICAL
Qty: 30 G | Refills: 1 | Status: SHIPPED | OUTPATIENT
Start: 2018-07-30 | End: 2019-08-01
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
## 2018-07-30 NOTE — PATIENT INSTRUCTIONS
Check with pharmacy to see if shingles vaccine is covered.      Preventive Health Recommendations    Female Ages 65 +    Yearly exam:     See your health care provider every year in order to  o Review health changes.   o Discuss preventive care.    o Review your medicines if your doctor has prescribed any.      You no longer need a yearly Pap test unless you've had an abnormal Pap test in the past 10 years. If you have vaginal symptoms, such as bleeding or discharge, be sure to talk with your provider about a Pap test.      Every 1 to 2 years, have a mammogram.  If you are over 69, talk with your health care provider about whether or not you want to continue having screening mammograms.      Every 10 years, have a colonoscopy. Or, have a yearly FIT test (stool test). These exams will check for colon cancer.       Have a cholesterol test every 5 years, or more often if your doctor advises it.       Have a diabetes test (fasting glucose) every three years. If you are at risk for diabetes, you should have this test more often.       At age 65, have a bone density scan (DEXA) to check for osteoporosis (brittle bone disease).    Shots:    Get a flu shot each year.    Get a tetanus shot every 10 years.    Talk to your doctor about your pneumonia vaccines. There are now two you should receive - Pneumovax (PPSV 23) and Prevnar (PCV 13).    Talk to your pharmacist about the shingles vaccine.    Talk to your doctor about the hepatitis B vaccine.    Nutrition:     Eat at least 5 servings of fruits and vegetables each day.      Eat whole-grain bread, whole-wheat pasta and brown rice instead of white grains and rice.      Get adequate Calcium and Vitamin D.     Lifestyle    Exercise at least 150 minutes a week (30 minutes a day, 5 days a week). This will help you control your weight and prevent disease.      Limit alcohol to one drink per day.      No smoking.       Wear sunscreen to prevent skin cancer.       See your dentist  twice a year for an exam and cleaning.      See your eye doctor every 1 to 2 years to screen for conditions such as glaucoma, macular degeneration and cataracts.                     Trochanteric Bursitis           What is trochanteric bursitis?   Trochanteric bursitis is irritation or inflammation of the trochanteric bursa. A bursa is a fluid-filled sac that acts as a cushion between tendons, bones, and skin. The trochanteric bursa is located on the upper, outer area of the thigh. There is a bump on the outer side of the upper part of the thigh bone (femur) called the greater trochanter. The trochanteric bursa is located over the greater trochanter.   How does it occur?   The trochanteric bursa may be inflamed by a group of muscles or tendons rubbing over the bursa and causing friction against the thigh bone. This injury can occur with running, walking, or bicycling, especially when the bicycle seat is too high.   What are the symptoms?   You have pain on the upper outer area of your thigh or in your hip. The pain is worse when you walk, bicycle, or go up or down stairs. You have pain when you move your thigh bone and feel tenderness in the area over the greater trochanter.   How is it diagnosed?   Your healthcare provider will ask about your symptoms and examine your hip and thigh.   How is it treated?   To treat this condition:   Put an ice pack, gel pack, or package of frozen vegetables, wrapped in a cloth on the area every 3 to 4 hours, for up to 20 minutes at a time.   Take an anti-inflammatory medicine such as ibuprofen, or other medicine as directed by your provider. Nonsteroidal anti-inflammatory medicines (NSAIDs) may cause stomach bleeding and other problems. These risks increase with age. Read the label and take as directed. Unless recommended by your healthcare provider, do not take for more than 10 days.   Your provider may give you an injection of a corticosteroid medicine.   While you are recovering  from your injury you will need to change your sport or activity to one that does not make your condition worse. For example, you may need to swim instead of running or bicycling. If you are bicycling, you may need to lower your bicycle seat.   How long do the effects last?   The length of recovery depends on many factors such as your age, health, and if you have had a previous injury. Recovery time also depends on the severity of the injury. A bursa that is only mildly inflamed and has just started to hurt may improve within a few weeks. A bursa that is significantly inflamed and has been painful for a long time may take up to a few months to improve. You need to stop doing the activities that cause pain until your bursa has healed. If you continue doing activities that cause pain, your symptoms will return and it will take longer to recover.   When can I return to my normal activities?   Everyone recovers from an injury at a different rate. Return to your activities depends on how soon your leg recovers, not by how many days or weeks it has been since your injury has occurred. In general, the longer you have symptoms before you start treatment, the longer it will take to get better. The goal of rehabilitation is to return to your normal activities as soon as is safely possible. If you return too soon you may worsen your injury.   You may safely return to your normal activities when, starting from the top of the list and progressing to the end, each of the following is true:   You have full range of motion in the injured leg compared to the uninjured leg.   You have full strength of the injured leg compared to the uninjured leg.   You can walk straight ahead without pain or limping.   How can I prevent trochanteric bursitis?   Trochanteric bursitis is best prevented by warming up properly and stretching the muscles on the outer side of your upper thigh.     Published by [employer].  This content is reviewed  periodically and is subject to change as new health information becomes available. The information is intended to inform and educate and is not a replacement for medical evaluation, advice, diagnosis or treatment by a healthcare professional.   Written by Bird Canela MD, for [employer].   ? 2010 [employer] and/or its affiliates. All Rights Reserved.   Copyright   Clinical Reference Systems 2011         Trochanteric Bursitis Rehabilitation Exercises   You can begin stretching the muscles that run along the outside of your hip using the first two exercise. You can do the strengthening exercises when the sharp pain lessens.   Stretching exercises     Piriformis stretch: Lying on your back with both knees bent, rest the ankle of your injured leg over the knee of your uninjured leg. Grasp the thigh of your uninjured leg and pull that knee toward your chest. You will feel a stretch along the buttocks and possibly along the outside of your hip on the injured side. Hold this for 15 to 30 seconds. Repeat 3 times.     Iliotibial band stretch (standing): Cross your uninjured leg in front of your injured leg and bend down and touch your toes. You can move your hands across the floor toward the uninjured side and you will feel more stretch on the outside of your thigh on the injured side. Hold this position for 15 to 30 seconds. Return to the starting position. Repeat 3 times.   Strengthening exercises     Straight leg raise: Lie on your back with your legs straight out in front of you. Tighten up the top of your thigh muscle on the injured leg and lift that leg about 8 inches off the floor, keeping the thigh muscle tight throughout. Slowly lower your leg back down to the floor. Do 3 sets of 10.     Wall squat with a ball: Stand with your back, shoulders, and head against a wall and look straight ahead. Keep your shoulders relaxed and your feet 1 foot away from the wall and a shoulder's width apart. Place a rolled up  pillow or a soccer-sized ball between your thighs. Keeping your head against the wall, slowly squat while squeezing the pillow or ball at the same time. Squat down until you are almost in a sitting position. Your thighs will not yet be parallel to the floor. Hold this position for 10 seconds and then slowly slide back up the wall. Make sure you keep squeezing the pillow or ball throughout this exercise. Repeat 10 times. Build up to 3 sets of 10.     Prone hip extension: Lie on your stomach with your legs straight out behind you. Tighten up your buttocks muscles and lift one leg off the floor about 8 inches. Keep your knee straight. Hold for 5 seconds. Then lower your leg and relax. Do 3 sets of 10.   Written by Mary Smith M.S., P.T., for [employer].   Published by [employer].   This content is reviewed periodically and is subject to change as new health information becomes available. The information is intended to inform and educate and is not a replacement for medical evaluation, advice, diagnosis or treatment by a healthcare professional.   Sports Medicine Advisor 2003.1 Index  Sports Medicine Advisor 2003.1 Credits   Copyright   2003 [employer]. All rights reserved.   Page footer image

## 2018-07-31 NOTE — PROGRESS NOTES
Blanquita  I have reviewed your recent labs. Here are the results:    -Liver and gallbladder tests are normal. (ALT,AST, Alk phos, bilirubin), kidney function is normal (Cr, GFR), Sodium is normal, Potassium is normal, Calcium is normal, Glucose is normal (diabetes screening test).   -LDL(bad) cholesterol level is elevated,  A diet high in fat and simple carbohydrates, genetics and being overweight can contribute to this. ADVISE: Exercise, a low fat, low carbohydrate diet and weight control are helpful to improve this.  Rechecking your fasting cholesterol panel in 12 months is recommended (Lipid w/ LDL reflex, DX:hyperlipidemia)  -Normal red blood cell (hgb) levels, normal white blood cell count and normal platelet levels.  -A1C (diabetic test) is normal and indicates that your blood sugar has been in a normal range the last 3 months.  -Vitamin D level is normal, 1000 IU daily in diet or supplements is recommended.   -Urine is normal.  For additional lab test information, labtestsonline.org is an excellent reference.     If you have any questions please do not hesitate to contact our office via phone ([phone]) or MyChart.    Nasra Herron, MS, PA-C  [facility] [place]

## 2018-10-31 ENCOUNTER — RADIANT APPOINTMENT (OUTPATIENT)
Dept: BONE DENSITY | Facility: CLINIC | Age: 68
End: 2018-10-31
Attending: PHYSICIAN ASSISTANT
Payer: COMMERCIAL

## 2018-10-31 ENCOUNTER — HOSPITAL ENCOUNTER (OUTPATIENT)
Dept: MAMMOGRAPHY | Facility: CLINIC | Age: 68
Discharge: HOME | End: 2018-10-31
Attending: PHYSICIAN ASSISTANT | Admitting: PHYSICIAN ASSISTANT
Payer: MEDICARE

## 2018-10-31 DIAGNOSIS — Z78.0 ASYMPTOMATIC POSTMENOPAUSAL STATUS: ICD-10-CM

## 2018-10-31 DIAGNOSIS — Z12.31 VISIT FOR SCREENING MAMMOGRAM: ICD-10-CM

## 2018-10-31 PROCEDURE — 77067 SCR MAMMO BI INCL CAD: CPT

## 2018-10-31 PROCEDURE — 77080 DXA BONE DENSITY AXIAL: CPT | Performed by: INTERNAL MEDICINE

## 2018-12-05 ENCOUNTER — OFFICE VISIT (OUTPATIENT)
Dept: FAMILY MEDICINE | Facility: CLINIC | Age: 68
End: 2018-12-05
Payer: COMMERCIAL

## 2018-12-05 VITALS
HEART RATE: 77 BPM | WEIGHT: 202 LBS | DIASTOLIC BLOOD PRESSURE: 68 MMHG | BODY MASS INDEX: 34.49 KG/M2 | TEMPERATURE: 97.8 F | SYSTOLIC BLOOD PRESSURE: 126 MMHG | HEIGHT: 64 IN | OXYGEN SATURATION: 97 %

## 2018-12-05 DIAGNOSIS — M76.61 ACHILLES TENDINITIS OF RIGHT LOWER EXTREMITY: Primary | ICD-10-CM

## 2018-12-05 PROCEDURE — 99213 OFFICE O/P EST LOW 20 MIN: CPT | Performed by: PHYSICIAN ASSISTANT

## 2018-12-05 NOTE — PROGRESS NOTES
"  SUBJECTIVE:                                                    Blanquita Trevizo is a 68 year old female who presents to clinic today for the following health issues:    \"did something\" but unsure what.  Doesn't note lump like last time. Normally wears inserts but this time wasn't wearing them when it occurred. Flaring up with activity-- was resolving with rest but started back up today.  Ibuprofen 200  BID and that helped. Icing helps. Has been wearing boots since then.   Joint Pain  Blanquita presents to clinic today for heel pain at the achilles that has been going on for a week. She notes sudden onset after she \"did something,\" but cannot recall what. She had a similar occurrence on 09/08/2016 that she later received physical therapy and this helped. She is interested in trying this again. She notes the pain flares with activity and resolves with rest. Over the weekend she tried ibuprofen 200 mg, icing, and rest and it had significantly improved. 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 She denies trauma.     Onset: x1 week    Description:   Location: left achillis area  Character: Sore     Intensity: mild    Progression of Symptoms: better - over weekend    Accompanying Signs & Symptoms:  Other symptoms: swelling    History:   Previous similar pain: YES- has lump in same area - went to PT      Precipitating factors:   Trauma or overuse: no     Alleviating factors:  Improved by: ice, rest, ibuprofen    Therapies Tried and outcome: see above    Problem list and histories reviewed & adjusted, as indicated.  Additional history: as documented      ROS:  Constitutional, HEENT, cardiovascular, pulmonary, GI, , musculoskeletal, neuro, skin, endocrine and psych systems are negative, except as otherwise noted.    OBJECTIVE:                                                    /68 (BP Location: Right arm, Patient Position: Chair, " "Cuff Size: Adult Large)  Pulse 77  Temp 97.8  F (36.6  C) (Oral)  Ht 5' 4\" (1.626 m)  Wt 202 lb (91.6 kg)  SpO2 97%  BMI 34.67 kg/m2  Body mass index is 34.67 kg/(m^2).  GENERAL: healthy, alert and no distress  MS: mild edema to the left ankle. Pain to palpation of the left achilles tendon and ankle. Full ROM and strength of the feet and ankles bilaterally.  Negative collins test.    NEURO: Normal strength and tone, mentation intact and speech normal  PSYCH: mentation appears normal, affect normal/bright    Diagnostic Test Results:  none      ASSESSMENT/PLAN:                                                      Blanquita was seen today for musculoskeletal problem.    Diagnoses and all orders for this visit:    Achilles tendinitis of right lower extremity  Patient was encouraged to try conservative therapies to include: good shoe support, icing the area, ibuprofen 600 mg three times daily for 3-5 days or tylenol 1000 mg TID for 3-5 days. She was told to rest the area. Patient is interested in physical therapy and was provided a referral today. She was encouraged to contact the clinic if she did not see improvement or had worsening of symptoms.   -     PHYSICAL THERAPY REFERRAL; Future    Followup: Return in about 1 week (around 12/12/2018) for Specialty followup, please be seen sooner if needed.     -- I have discussed the patient's diagnosis, and my plan of treatment with the patient and/or family. Patient is aware to followup if symptoms do not improve.  Patient has been advised to be seen sooner or seek more immediate care if symptoms change or worsen.  Patient agrees with and understands the plan today.     See Patient Instructions        Annette Jasmine PA-C    [facility] [place]    "

## 2018-12-05 NOTE — MR AVS SNAPSHOT
After Visit Summary   12/5/2018    Blanquita Trevizo    MRN: [id number]           Patient Information     Date Of Birth          1950        Visit Information        Provider Department      12/5/2018 2:40 PM Annette Jasmine PA-C [facility] [place]        Today's Diagnoses     Achilles tendinitis of right lower extremity    -  1      Care Instructions        Understanding Achilles Tendonitis    Achilles tendonitis is an overuse injury. It results in inflammation of the Achilles tendon. This tendon is found on the back of the ankle. It links the calf muscle to the heel bone. It helps you do pushing-off movements like running or standing on your toes.     How to say it  uh-KILL-eez ten-dun-I-tis   What causes Achilles tendonitis?  Achilles tendonitis can happen if you do an activity like running, walking, or jumping too much. This overuse can strain, or pull, the tendon. It may lead to minor tearing of the tendon. An injury to the lower leg or foot can also cause it.  If you don t warm up before taking part in sports such as basketball, you are more likely to suffer from this condition. You are also more prone to it if you do too much of such an activity too quickly. Proper training and rest can help prevent it.  Symptoms of Achilles tendonitis  The main symptom of Achilles tendonitis is pain. This pain mostly happens when you move the ankle. The tendon may also feel stiff after a period of no activity, such as sleeping. It may also become swollen. You may hear a crackling sound when you move your ankle.  Treatment for Achilles tendonitis  Symptoms often get better after starting treatment. A full recovery may take several months. Treatments include:    Rest. You should stop or change the activity that caused the injury. The tendon will then have time to heal.    Cold or heat pack. These help reduce pain and swelling.    Prescription or over-the-counter pain medicines. These help reduce  pain and swelling.    Shoe inserts. These devices can reduce strain on the Achilles tendon when you move. You may then feel less pain.    Stretching and strengthening exercises. Certain exercises can help you regain flexibility and strength in your Achilles tendon.    Surgery. This option can fix the injured tendon. But you don t often need it unless other treatments don t work.     When to call your healthcare provider   Call your healthcare provider right away if you have any of these:    Fever of 100.4 F (38 C) or higher, or as directed    Pain that gets worse    Symptoms that don t get better, or get worse    New symptoms    Date Last Reviewed: 3/10/2016    2102-9826 [employer]. [address] [place] [address]. All rights reserved. This information is not intended as a substitute for professional medical care. Always follow your healthcare professional's instructions.      Plantarflexion (Strength)    These instructions are for your right ankle. Switch sides for your left ankle.  1. Sit on a bed or the floor with your right leg out straight.  2. Loop an elastic exercise band or tubing around your right foot. Hold the ends in your hands.  3. Push on the elastic band with the ball of your foot, pointing your toes. Pull the elastic band toward as you do this. Hold for 5 seconds. Then move your foot back to the starting position.  4. Repeat 10 times, or as instructed.  5. Do this exercise 3 times a day, or as instructed.  Date Last Reviewed: 3/10/2016    1886-1958 [employer]. [address] [place] [address]. All rights reserved. This information is not intended as a substitute for professional medical care. Always follow your healthcare professional's instructions.        Calf Raise (Strength)    1. Stand up straight with both feet flat on the floor, slightly apart. Hold onto a sturdy chair, railing, counter, or table.  2. Raise both heels so you re standing on the  balls of your feet. Don t lock your knees or arch your back. Hold for 5 seconds. Then slowly lower your heels back down to the floor.  3. Repeat 10 times, or as instructed.  4. Do this exercise 3 times a day, or as instructed.     Challenge yourself  As you become stronger, do this exercise on one foot at a time.   Date Last Reviewed: 3/10/2016    9695-1344 [employer]. [address] [place] [address], [place] [address]. All rights reserved. This information is not intended as a substitute for professional medical care. Always follow your healthcare professional's instructions.            Follow-ups after your visit        Additional Services     PHYSICAL THERAPY REFERRAL       If you have not heard from the scheduling office within 2 business days, please call [phone] for all locations, with the exception of [place], please call [phone] and [place], please call [phone].    Please be aware that coverage of these services is subject to the terms and limitations of your health insurance plan.  Call member services at your health plan with any benefit or coverage questions.                  Future tests that were ordered for you today     Open Future Orders        Priority Expected Expires Ordered    PHYSICAL THERAPY REFERRAL Routine  12/5/2019 12/5/2018            Who to contact     If you have questions or need follow up information about today's clinic visit or your schedule please contact [facility] directly at [phone].  Normal or non-critical lab and imaging results will be communicated to you by MyChart, letter or phone within 4 business days after the clinic has received the results. If you do not hear from us within 7 days, please contact the clinic through MyChart or phone. If you have a critical or abnormal lab result, we will notify you by phone as soon as possible.  Submit refill requests through [employer] or call your pharmacy and they will forward the refill  "request to us. Please allow 3 business days for your refill to be completed.          Additional Information About Your Visit        MyChart Information     [employer] App gives you secure access to your electronic health record. If you see a primary care provider, you can also send messages to your care team and make appointments. If you have questions, please call your primary care clinic.  If you do not have a primary care provider, please call [phone] and they will assist you.        Care EveryWhere ID     This is your Care EveryWhere ID. This could be used by other organizations to access your [place] medical records  [id number]        Your Vitals Were     Pulse Temperature Height Pulse Oximetry BMI (Body Mass Index)       77 97.8  F (36.6  C) (Oral) 5' 4\" (1.626 m) 97% 34.67 kg/m2        Blood Pressure from Last 3 Encounters:   12/05/18 126/68   07/30/18 126/64   02/15/18 124/78    Weight from Last 3 Encounters:   12/05/18 202 lb (91.6 kg)   07/30/18 198 lb (89.8 kg)   02/15/18 199 lb (90.3 kg)               Primary Care Provider Office Phone # Fax #    Nasra Herron PA-C [phone]       [address] [place] [address]        Equal Access to Services     TAWNYA JASON AH: Hadii aad ku hadasho Soomaali, waaxda luqadaha, qaybta kaalmada adeegyada, waxay idiin hayaan emmy robert . So [facility] [phone].    ATENCIÓN: Si habla español, tiene a lou disposición servicios gratuitos de asistencia lingüística. Llame al [phone].    We comply with applicable federal civil rights laws and [place] laws. We do not discriminate on the basis of race, color, national origin, age, disability, sex, sexual orientation, or gender identity.            Thank you!     Thank you for choosing [facility]  for your care. Our goal is always to provide you with excellent care. Hearing back from our patients is one way we can continue to improve our services. Please take a few " minutes to complete the written survey that you may receive in the mail after your visit with us. Thank you!             Your Updated Medication List - Protect others around you: Learn how to safely use, store and throw away your medicines at www.disposemymeds.org.          This list is accurate as of 12/5/18  3:42 PM.  Always use your most recent med list.                   Brand Name Dispense Instructions for use Diagnosis    clobetasol 0.05 % external ointment    TEMOVATE    30 g    Apply to rash on feet two times per day for 10 days then once per day.  Do not apply to face.    Dermatitis       FISH OIL ADULT GUMMIES 113.5 MG Chew           VITAMIN D (CHOLECALCIFEROL) PO      Take by mouth daily        vitamin E 400 units (180 mg) capsule    TOCOPHEROL    30 capsule    Strength unknown at this time - patient uses what is cost effective.

## 2018-12-05 NOTE — PATIENT INSTRUCTIONS
Understanding Achilles Tendonitis    Achilles tendonitis is an overuse injury. It results in inflammation of the Achilles tendon. This tendon is found on the back of the ankle. It links the calf muscle to the heel bone. It helps you do pushing-off movements like running or standing on your toes.     How to say it  uh-JEFF-sandroz ten-dun-I-tis   What causes Achilles tendonitis?  Achilles tendonitis can happen if you do an activity like running, walking, or jumping too much. This overuse can strain, or pull, the tendon. It may lead to minor tearing of the tendon. An injury to the lower leg or foot can also cause it.  If you don t warm up before taking part in sports such as basketball, you are more likely to suffer from this condition. You are also more prone to it if you do too much of such an activity too quickly. Proper training and rest can help prevent it.  Symptoms of Achilles tendonitis  The main symptom of Achilles tendonitis is pain. This pain mostly happens when you move the ankle. The tendon may also feel stiff after a period of no activity, such as sleeping. It may also become swollen. You may hear a crackling sound when you move your ankle.  Treatment for Achilles tendonitis  Symptoms often get better after starting treatment. A full recovery may take several months. Treatments include:    Rest. You should stop or change the activity that caused the injury. The tendon will then have time to heal.    Cold or heat pack. These help reduce pain and swelling.    Prescription or over-the-counter pain medicines. These help reduce pain and swelling.    Shoe inserts. These devices can reduce strain on the Achilles tendon when you move. You may then feel less pain.    Stretching and strengthening exercises. Certain exercises can help you regain flexibility and strength in your Achilles tendon.    Surgery. This option can fix the injured tendon. But you don t often need it unless other treatments don t  work.     When to call your healthcare provider   Call your healthcare provider right away if you have any of these:    Fever of 100.4 F (38 C) or higher, or as directed    Pain that gets worse    Symptoms that don t get better, or get worse    New symptoms    Date Last Reviewed: 3/10/2016    7891-5227 [employer] [address] [place] [address]. All rights reserved. This information is not intended as a substitute for professional medical care. Always follow your healthcare professional's instructions.      Plantarflexion (Strength)    These instructions are for your right ankle. Switch sides for your left ankle.  1. Sit on a bed or the floor with your right leg out straight.  2. Loop an elastic exercise band or tubing around your right foot. Hold the ends in your hands.  3. Push on the elastic band with the ball of your foot, pointing your toes. Pull the elastic band toward as you do this. Hold for 5 seconds. Then move your foot back to the starting position.  4. Repeat 10 times, or as instructed.  5. Do this exercise 3 times a day, or as instructed.  Date Last Reviewed: 3/10/2016    9158-6625 [employer]. [address] [place] [address]. All rights reserved. This information is not intended as a substitute for professional medical care. Always follow your healthcare professional's instructions.        Calf Raise (Strength)    1. Stand up straight with both feet flat on the floor, slightly apart. Hold onto a sturdy chair, railing, counter, or table.  2. Raise both heels so you re standing on the balls of your feet. Don t lock your knees or arch your back. Hold for 5 seconds. Then slowly lower your heels back down to the floor.  3. Repeat 10 times, or as instructed.  4. Do this exercise 3 times a day, or as instructed.     Challenge yourself  As you become stronger, do this exercise on one foot at a time.   Date Last Reviewed: 3/10/2016    9837-4549 [employer]. [address] [place] [address], [place] [address]. All rights reserved. This information is not intended as a substitute for professional medical care. Always follow your healthcare professional's instructions.

## 2018-12-11 ENCOUNTER — TELEPHONE (OUTPATIENT)
Dept: FAMILY MEDICINE | Facility: CLINIC | Age: 68
End: 2018-12-11

## 2018-12-11 NOTE — TELEPHONE ENCOUNTER
URINARY TRACT SYMPTOMS  Onset: yesterday morning 12/10/2018    Description:   Painful urination (Dysuria): no   Blood in urine (Hematuria): YES  Delay in urine (Hesitency): no   Frequency: no   Urgency: no    Intensity: moderate    Progression of Symptoms:  same    Accompanying Signs & Symptoms:  Fever/chills: no   Flank pain no   Nausea and vomiting: no   Any vaginal symptoms: none  Abdominal/Pelvic Pain: no     History:   History of frequent UTI's: no   History of kidney stones: no   Sexually Active: YES  Possibility of pregnancy: No    Precipitating factors:   None noted     Therapies Tried and outcome: Increase fluid intake       LOV 12/5/2018     Best # [phone] ok LM     Pt would like to just drop off a urine does not want to be seen without having the urine first     Please advise     Thank you     Sita Whiteheda RN, BSN  [facility] [facility]

## 2018-12-12 NOTE — TELEPHONE ENCOUNTER
I left message and sent mychart offering evisit or office visit    Leena Peterson RN- Triage [employer]

## 2018-12-12 NOTE — TELEPHONE ENCOUNTER
Please call patient and advised her to either start an E-Visit, or she will need an in office visit for these concerns.

## 2018-12-13 NOTE — TELEPHONE ENCOUNTER
Attempt #2  Called patient @ # below - Left a detailed message with TANIKA WEBB message below    Alaina Mejia RN  [place] [facility]

## 2019-04-08 ENCOUNTER — TELEPHONE (OUTPATIENT)
Dept: FAMILY MEDICINE | Facility: CLINIC | Age: 69
End: 2019-04-08

## 2019-04-08 NOTE — TELEPHONE ENCOUNTER
Routing to LP to review and advise.    Laura Paul, BS, RN, N  [facility]) [phone]

## 2019-04-08 NOTE — TELEPHONE ENCOUNTER
Please advise pt that zofran and immodium are not advised with norovirus.  It can make the illness worse and cause hospitalization.  I am not comfortable prescribing this.    The best treatment is good handwashing, bottled water.      Nasra Herron MS, PA-C

## 2019-04-08 NOTE — TELEPHONE ENCOUNTER
Patient notified by phone of LP recommendation below.  Patient verbalized understanding and agreed with plan.    JOSUÉ Gutierrez, RN, N  [facility]) [phone]

## 2019-04-08 NOTE — TELEPHONE ENCOUNTER
Detailed message left for patient with LP recommendation below.      Laura Paul, JOSUÉ, RN, N  [facility]) [phone]

## 2019-04-08 NOTE — TELEPHONE ENCOUNTER
Patient is traveling this weekend to [place] and is requesting Zofran. Would Nasra prescribe this? Please advise  preferred pharmacy: [facility], [place]  [phone]  Ok to leave detailed message: yes  Thank you  An Le

## 2019-04-08 NOTE — TELEPHONE ENCOUNTER
I do not see that we have ever prescribed this, what does she need it for?  Has she taken it before?      If you feel appropriate we could do phone or E-visit.      Nasra Herron MS, PA-C

## 2019-04-08 NOTE — TELEPHONE ENCOUNTER
Called patient @ # below -     Patient stated that [place] in [place]. Stated if you google that area in [place] there is a lot of sickness from the Kim Virus (patient unsure of correct name - stated it is diarrhea/vomiting) and the waste system is not working so all the sewage is going into water systems.   Patient stated she just wanted to be prepared for if she gets sick.   Patient will be bringing imodium for possible diarrhea.     Routing to PCP for further review/recommendations/orders.    lAaina Mejia RN  [facility]
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
## 2019-07-29 NOTE — PATIENT INSTRUCTIONS
Preventive Health Recommendations    See your health care provider every year to    Review health changes.     Discuss preventive care.      Review your medicines if your doctor has prescribed any.      You no longer need a yearly Pap test unless you've had an abnormal Pap test in the past 10 years. If you have vaginal symptoms, such as bleeding or discharge, be sure to talk with your provider about a Pap test.      Every 1 to 2 years, have a mammogram.  If you are over 69, talk with your health care provider about whether or not you want to continue having screening mammograms.      Every 10 years, have a colonoscopy. Or, have a yearly FIT test (stool test). These exams will check for colon cancer.       Have a cholesterol test every 5 years, or more often if your doctor advises it.       Have a diabetes test (fasting glucose) every three years. If you are at risk for diabetes, you should have this test more often.       At age 65, have a bone density scan (DEXA) to check for osteoporosis (brittle bone disease).    Shots:    Get a flu shot each year.    Get a tetanus shot every 10 years.    Talk to your doctor about your pneumonia vaccines. There are now two you should receive - Pneumovax (PPSV 23) and Prevnar (PCV 13).    Talk to your pharmacist about the shingles vaccine.    Talk to your doctor about the hepatitis B vaccine.    Nutrition:     Eat at least 5 servings of fruits and vegetables each day.      Eat whole-grain bread, whole-wheat pasta and brown rice instead of white grains and rice.      Get adequate about Calcium and Vitamin D.     Lifestyle    Exercise at least 150 minutes a week (30 minutes a day, 5 days a week). This will help you control your weight and prevent disease.      Limit alcohol to one drink per day.      No smoking.       Wear sunscreen to prevent skin cancer.       See your dentist twice a year for an exam and cleaning.      See your eye doctor every 1 to 2 years to screen for  conditions such as glaucoma, macular degeneration, cataracts, etc.    Personalized Prevention Plan  You are due for the preventive services outlined below.  Your care team is available to assist you in scheduling these services.  If you have already completed any of these items, please share that information with your care team to update in your medical record.    Health Maintenance Due   Topic Date Due     Zoster (Shingles) Vaccine (2 of 3) 11/24/2012     FALL RISK ASSESSMENT  07/30/2019     Annual Wellness Visit  07/30/2019     Patient Education   Personalized Prevention Plan  You are due for the preventive services outlined below.  Your care team is available to assist you in scheduling these services.  If you have already completed any of these items, please share that information with your care team to update in your medical record.  Health Maintenance Due   Topic Date Due     Zoster (Shingles) Vaccine (2 of 3) 11/24/2012     FALL RISK ASSESSMENT  07/30/2019     Annual Wellness Visit  07/30/2019

## 2019-08-01 ENCOUNTER — OFFICE VISIT (OUTPATIENT)
Dept: FAMILY MEDICINE | Facility: CLINIC | Age: 69
End: 2019-08-01
Payer: MEDICARE

## 2019-08-01 VITALS
WEIGHT: 205 LBS | HEART RATE: 75 BPM | HEIGHT: 64 IN | OXYGEN SATURATION: 96 % | DIASTOLIC BLOOD PRESSURE: 70 MMHG | TEMPERATURE: 98.6 F | BODY MASS INDEX: 35 KG/M2 | SYSTOLIC BLOOD PRESSURE: 126 MMHG

## 2019-08-01 DIAGNOSIS — Z00.00 ENCOUNTER FOR MEDICARE ANNUAL WELLNESS EXAM: Primary | ICD-10-CM

## 2019-08-01 DIAGNOSIS — Z12.31 VISIT FOR SCREENING MAMMOGRAM: ICD-10-CM

## 2019-08-01 DIAGNOSIS — C54.1 ENDOMETRIAL CANCER (H): ICD-10-CM

## 2019-08-01 DIAGNOSIS — M70.62 TROCHANTERIC BURSITIS OF LEFT HIP: ICD-10-CM

## 2019-08-01 DIAGNOSIS — R73.01 IMPAIRED FASTING GLUCOSE: ICD-10-CM

## 2019-08-01 DIAGNOSIS — E66.01 MORBID OBESITY (H): ICD-10-CM

## 2019-08-01 DIAGNOSIS — R20.9 DISTURBANCE OF SKIN SENSATION: ICD-10-CM

## 2019-08-01 DIAGNOSIS — L30.9 DERMATITIS: ICD-10-CM

## 2019-08-01 DIAGNOSIS — E78.5 HYPERLIPIDEMIA LDL GOAL <130: ICD-10-CM

## 2019-08-01 DIAGNOSIS — M85.80 OSTEOPENIA, UNSPECIFIED LOCATION: ICD-10-CM

## 2019-08-01 DIAGNOSIS — D12.6 TUBULAR ADENOMA OF COLON: ICD-10-CM

## 2019-08-01 DIAGNOSIS — R07.89 ATYPICAL CHEST PAIN: ICD-10-CM

## 2019-08-01 DIAGNOSIS — E87.0 HYPERNATREMIA: ICD-10-CM

## 2019-08-01 DIAGNOSIS — R26.9 GAIT DIFFICULTY: ICD-10-CM

## 2019-08-01 PROBLEM — M76.60 PAIN IN ACHILLES TENDON: Status: RESOLVED | Noted: 2017-06-23 | Resolved: 2019-08-01

## 2019-08-01 LAB
ALBUMIN SERPL-MCNC: 3.6 G/DL (ref 3.4–5)
ALP SERPL-CCNC: 48 U/L (ref 40–150)
ALT SERPL W P-5'-P-CCNC: 28 U/L (ref 0–50)
ANION GAP SERPL CALCULATED.3IONS-SCNC: 9 MMOL/L (ref 3–14)
AST SERPL W P-5'-P-CCNC: 17 U/L (ref 0–45)
BILIRUB SERPL-MCNC: 0.3 MG/DL (ref 0.2–1.3)
BUN SERPL-MCNC: 17 MG/DL (ref 7–30)
CALCIUM SERPL-MCNC: 8.6 MG/DL (ref 8.5–10.1)
CHLORIDE SERPL-SCNC: 112 MMOL/L (ref 94–109)
CHOLEST SERPL-MCNC: 231 MG/DL
CO2 SERPL-SCNC: 24 MMOL/L (ref 20–32)
CREAT SERPL-MCNC: 0.56 MG/DL (ref 0.52–1.04)
GFR SERPL CREATININE-BSD FRML MDRD: >90 ML/MIN/{1.73_M2}
GLUCOSE SERPL-MCNC: 97 MG/DL (ref 70–99)
HBA1C MFR BLD: 5.5 % (ref 0–5.6)
HDLC SERPL-MCNC: 71 MG/DL
LDLC SERPL CALC-MCNC: 129 MG/DL
NONHDLC SERPL-MCNC: 160 MG/DL
POTASSIUM SERPL-SCNC: 4.5 MMOL/L (ref 3.4–5.3)
PROT SERPL-MCNC: 7.6 G/DL (ref 6.8–8.8)
PTH-INTACT SERPL-MCNC: 70 PG/ML (ref 18–80)
SODIUM SERPL-SCNC: 145 MMOL/L (ref 133–144)
TRIGL SERPL-MCNC: 153 MG/DL
TSH SERPL DL<=0.005 MIU/L-ACNC: 1.78 MU/L (ref 0.4–4)

## 2019-08-01 PROCEDURE — 80061 LIPID PANEL: CPT | Performed by: PHYSICIAN ASSISTANT

## 2019-08-01 PROCEDURE — 84443 ASSAY THYROID STIM HORMONE: CPT | Performed by: PHYSICIAN ASSISTANT

## 2019-08-01 PROCEDURE — 83036 HEMOGLOBIN GLYCOSYLATED A1C: CPT | Performed by: PHYSICIAN ASSISTANT

## 2019-08-01 PROCEDURE — 99214 OFFICE O/P EST MOD 30 MIN: CPT | Mod: 25 | Performed by: PHYSICIAN ASSISTANT

## 2019-08-01 PROCEDURE — 93000 ELECTROCARDIOGRAM COMPLETE: CPT | Performed by: PHYSICIAN ASSISTANT

## 2019-08-01 PROCEDURE — 83970 ASSAY OF PARATHORMONE: CPT | Performed by: PHYSICIAN ASSISTANT

## 2019-08-01 PROCEDURE — G0439 PPPS, SUBSEQ VISIT: HCPCS | Performed by: PHYSICIAN ASSISTANT

## 2019-08-01 PROCEDURE — 80053 COMPREHEN METABOLIC PANEL: CPT | Performed by: PHYSICIAN ASSISTANT

## 2019-08-01 PROCEDURE — 36415 COLL VENOUS BLD VENIPUNCTURE: CPT | Performed by: PHYSICIAN ASSISTANT

## 2019-08-01 RX ORDER — MULTIVIT-MIN/IRON/FOLIC ACID/K 18-600-40
CAPSULE ORAL DAILY
COMMUNITY
Start: 2019-08-01

## 2019-08-01 RX ORDER — CLOBETASOL PROPIONATE 0.5 MG/G
OINTMENT TOPICAL
Qty: 30 G | Refills: 1 | Status: SHIPPED | OUTPATIENT
Start: 2019-08-01 | End: 2020-02-14

## 2019-08-01 ASSESSMENT — ACTIVITIES OF DAILY LIVING (ADL): CURRENT_FUNCTION: NO ASSISTANCE NEEDED

## 2019-08-01 ASSESSMENT — MIFFLIN-ST. JEOR: SCORE: 1434.87

## 2019-08-01 NOTE — RESULT ENCOUNTER NOTE
Blanquita  I have reviewed your recent labs. Here are the results:    -LDL(bad) cholesterol level is elevated, and your triglycerides are elevated which can increase your heart disease risk.  A diet high in fat and simple carbohydrates, genetics and being overweight can contribute to this. ADVISE: exercising 150 minutes of aerobic exercise per week (30 minutes for 5 days per week or 50 minutes for 3 days per week are options), eating a low saturated fat/low carbohydrate diet, and omega-3 fatty acids (fish oil) 7016-4055 mg daily are helpful to improve this. In 12 months, you should recheck your fasting cholesterol panel.  -Liver and gallbladder tests (ALT,AST, Alk phos,bilirubin) are normal.  -Kidney function (GFR) is normal.  -Sodium is elevated.  ADVISE:  This can be due to dehydration and you have had this in the past.  I would advise increasing hydration efforts and rechecking in 1 month.  -Potassium is normal.  -Calcium is normal.  -Glucose (diabetic screening test) is normal.  -A1C (diabetic test) is normal and indicates that your blood sugar has been in a normal range the last 3 months.  -TSH (thyroid stimulating hormone) level is normal which indicates normal thyroid function.  -Parathyroid hormone level is normal (not contributing to your osteopenia).    For additional lab test information, labtestsonline.org is an excellent reference.      If you have any questions please do not hesitate to contact our office via phone ([phone]) or MyChart.    Nasra Herron, MS, PAXiaoC  [facility]

## 2019-08-06 ENCOUNTER — THERAPY VISIT (OUTPATIENT)
Dept: PHYSICAL THERAPY | Facility: CLINIC | Age: 69
End: 2019-08-06
Payer: MEDICARE

## 2019-08-06 DIAGNOSIS — M70.62 TROCHANTERIC BURSITIS OF LEFT HIP: ICD-10-CM

## 2019-08-06 PROCEDURE — 97110 THERAPEUTIC EXERCISES: CPT | Mod: GP | Performed by: PHYSICAL THERAPIST

## 2019-08-06 PROCEDURE — 97161 PT EVAL LOW COMPLEX 20 MIN: CPT | Mod: GP | Performed by: PHYSICAL THERAPIST

## 2019-08-06 ASSESSMENT — ACTIVITIES OF DAILY LIVING (ADL)
HOS_ADL_HIGHEST_POTENTIAL_SCORE: 68
GETTING_INTO_AND_OUT_OF_A_BATHTUB: SLIGHT DIFFICULTY
GOING_DOWN_1_FLIGHT_OF_STAIRS: SLIGHT DIFFICULTY
SITTING_FOR_15_MINUTES: NO DIFFICULTY AT ALL
WALKING_INITIALLY: SLIGHT DIFFICULTY
STEPPING_UP_AND_DOWN_CURBS: NO DIFFICULTY AT ALL
RECREATIONAL_ACTIVITIES: SLIGHT DIFFICULTY
GOING_UP_1_FLIGHT_OF_STAIRS: SLIGHT DIFFICULTY
WALKING_15_MINUTES_OR_GREATER: SLIGHT DIFFICULTY
PUTTING_ON_SOCKS_AND_SHOES: NO DIFFICULTY AT ALL
STANDING_FOR_15_MINUTES: NO DIFFICULTY AT ALL
WALKING_DOWN_STEEP_HILLS: SLIGHT DIFFICULTY
WALKING_APPROXIMATELY_10_MINUTES: NO DIFFICULTY AT ALL
TWISTING/PIVOTING_ON_INVOLVED_LEG: NO DIFFICULTY AT ALL
ROLLING_OVER_IN_BED: NO DIFFICULTY AT ALL
GETTING_INTO_AND_OUT_OF_AN_AVERAGE_CAR: NO DIFFICULTY AT ALL
HOS_ADL_ITEM_SCORE_TOTAL: 56
LIGHT_TO_MODERATE_WORK: NO DIFFICULTY AT ALL
HOS_ADL_SCORE(%): 82.35
WALKING_UP_STEEP_HILLS: SLIGHT DIFFICULTY
HOS_ADL_COUNT: 17
HEAVY_WORK: MODERATE DIFFICULTY
HOW_WOULD_YOU_RATE_YOUR_CURRENT_LEVEL_OF_FUNCTION_DURING_YOUR_USUAL_ACTIVITIES_OF_DAILY_LIVING_FROM_0_TO_100_WITH_100_BEING_YOUR_LEVEL_OF_FUNCTION_PRIOR_TO_YOUR_HIP_PROBLEM_AND_0_BEING_THE_INABILITY_TO_PERFORM_ANY_OF_YOUR_USUAL_DAILY_ACTIVITIES?: 85
DEEP_SQUATTING: MODERATE DIFFICULTY

## 2019-08-06 NOTE — LETTER
DEPARTMENT OF HEALTH AND HUMAN SERVICES  CENTERS FOR MEDICARE & MEDICAID SERVICES    PLAN/UPDATED PLAN OF PROGRESS FOR OUTPATIENT REHABILITATION    PATIENTS NAME:  Blanquita Trevizo   : 1950  PROVIDER NUMBER:    [id number]  HICN:    [id number]  PROVIDER NAME: [facility] [employer] [facility]  MEDICAL RECORD NUMBER: [id number]   START OF CARE DATE:  SOC Date: 19   TYPE:  PT  PRIMARY/TREATMENT DIAGNOSIS: Trochanteric bursitis of left hip  VISITS FROM START OF CARE:  Rxs Used: 1     [place] [facility] Initial Evaluation  Subjective:  The history is provided by the patient. No  was used.   Type of problem:  Left hip   Occurance: L hip pain, pt with recurrent L hip injections (most recent being about 4 months ago).  This is a recurrent condition   [facility] Additional Details: 2019.   Patient reports pain:  Lateral and greater trochanter. Radiates to:  No radiation. Associated symptoms:  Loss of strength and loss of motion/stiffness. Symptoms are exacerbated by activity and relieved by rest (Injections).    Where condition occurred: for unknown reasons.  and reported as 3/10 (some days feeling twinge, but overall feeling better since injection 4 months ago) on pain scale. General health as reported by patient is good. Pertinent medical history includes:  Overweight.    Surgeries include:  Orthopedic surgery and cancer surgery.       Pain is described as aching and is intermittent. Pain is worse during the night. Since onset symptoms are gradually improving.      Patient is sub- teacher.     Objective:  Gait:    Gait Type:  Antalgic   Weight Bearing Status:  WBAT     Deviations:  Hip:  Decr dynamic control L  Hip Evaluation  Hip PROM:  : slight loss L hip IR.  Hip Strength:    Flexion:   Left: 4+/5   +  Pain:     Abduction:  Left: 3+/5    -   Pain:  Adduction:  Left: 5-/5    Pain:  Hip Palpation:    Left hip tenderness present at:   Greater Trachanter; Abductors; Gluteus  Medius and Bursa    Assessment/Plan:    Patient is a 68 year old female with left side hip complaints.    Patient has the following significant findings with corresponding treatment plan.                Diagnosis 1:  L hip pain, weakness    Pain -  self management and education  Decreased ROM/flexibility - manual therapy and therapeutic exercise  Decreased strength - therapeutic exercise and therapeutic activities  Impaired balance - neuro re-education and therapeutic activities  PATIENTS NAME:  Blanquita Trevizo   : 1950    Decreased proprioception - neuro re-education and therapeutic activities  Inflammation - cold therapy  Impaired gait - gait training  Decreased function - therapeutic activities    Previous and current functional limitations:  (See Goal Flow Sheet for this information)    Short term and Long term goals: (See Goal Flow Sheet for this information)     Communication ability:  Patient appears to be able to clearly communicate and understand verbal and written communication and follow directions correctly.  Treatment Explanation - The following has been discussed with the patient:   RX ordered/plan of care  Anticipated outcomes  Possible risks and side effects  This patient would benefit from PT intervention to resume normal activities.   Rehab potential is good.    Frequency:  2 X a month, once daily  Duration:  for 2 months  Discharge Plan:  Achieve all LTG.  Independent in home treatment program.  Reach maximal therapeutic benefit.      Caregiver Signature/Credentials _____________________________ Date ________       Treating Provider: Edy Pérez PT     I have reviewed and certified the need for these services and plan of treatment while under my care.        PHYSICIAN'S SIGNATURE:   _____________________________________  Date___________                          Nasra Herron PAC    Certification period:  Beginning of Cert date period: 19 to  End of Cert period date: 10/05/19  "    Functional Level Progress Report: Please see attached \"Goal Flow sheet for Functional level.\"    ____X____ Continue Services or       ________ DC Services                Service dates: From  SOC Date: 08/06/19 date to present                         "

## 2019-08-06 NOTE — PROGRESS NOTES
[facility] Initial Evaluation  Subjective:  The history is provided by the patient. No  was used.   Type of problem:  Left hip   Occurance: L hip pain, pt with recurrent L hip injections (most recent being about 4 months ago).  This is a recurrent condition   [facility] Additional Details: July 2019.   Patient reports pain:  Lateral and greater trochanter. Radiates to:  No radiation. Associated symptoms:  Loss of strength and loss of motion/stiffness. Symptoms are exacerbated by activity and relieved by rest (Injections).    Where condition occurred: for unknown reasons.  and reported as 3/10 (some days feeling twinge, but overall feeling better since injection 4 months ago) on pain scale. General health as reported by patient is good. Pertinent medical history includes:  Overweight.    Surgeries include:  Orthopedic surgery and cancer surgery.       Pain is described as aching and is intermittent. Pain is worse during the night. Since onset symptoms are gradually improving.      Patient is sub- teacher.                           Objective:    Gait:    Gait Type:  Antalgic   Weight Bearing Status:  WBAT     Deviations:  Hip:  Decr dynamic control L                                                 Hip Evaluation  Hip PROM:  : slight loss L hip IR.                          Hip Strength:    Flexion:   Left: 4+/5   +  Pain:                        Abduction:  Left: 3+/5    -   Pain:  Adduction:  Left: 5-/5    Pain:                  Hip Palpation:    Left hip tenderness present at:   Greater Trachanter; Abductors; Gluteus Medius and Bursa               General     ROS    Assessment/Plan:    Patient is a 68 year old female with left side hip complaints.    Patient has the following significant findings with corresponding treatment plan.                Diagnosis 1:  L hip pain, weakness    Pain -  self management and education  Decreased ROM/flexibility - manual therapy and therapeutic  exercise  Decreased strength - therapeutic exercise and therapeutic activities  Impaired balance - neuro re-education and therapeutic activities  Decreased proprioception - neuro re-education and therapeutic activities  Inflammation - cold therapy  Impaired gait - gait training  Decreased function - therapeutic activities    Previous and current functional limitations:  (See Goal Flow Sheet for this information)    Short term and Long term goals: (See Goal Flow Sheet for this information)     Communication ability:  Patient appears to be able to clearly communicate and understand verbal and written communication and follow directions correctly.  Treatment Explanation - The following has been discussed with the patient:   RX ordered/plan of care  Anticipated outcomes  Possible risks and side effects  This patient would benefit from PT intervention to resume normal activities.   Rehab potential is good.    Frequency:  2 X a month, once daily  Duration:  for 2 months  Discharge Plan:  Achieve all LTG.  Independent in home treatment program.  Reach maximal therapeutic benefit.    Please refer to the daily flowsheet for treatment today, total treatment time and time spent performing 1:1 timed codes.

## 2019-08-08 ENCOUNTER — TELEPHONE (OUTPATIENT)
Dept: FAMILY MEDICINE | Facility: CLINIC | Age: 69
End: 2019-08-08

## 2019-08-08 NOTE — TELEPHONE ENCOUNTER
Reason for Call:  Form, our goal is to have forms completed with 72 hours, however, some forms may require a visit or additional information.    Type of letter, form or note:  Continued Plan of Care     Who is the form from?: [facility]  (if other please explain)    Where did the form come from: form was faxed in    What clinic location was the form placed at?: [place]    Where the form was placed: [facility] bin     What number is listed as a contact on the form?: [phone]       Additional comments:     Call taken on 8/8/2019 at 3:47 PM by Leigh Ann Hernandez

## 2019-08-10 NOTE — TELEPHONE ENCOUNTER
Noted.  Pt using very intermittently.  Form filled out and placed in  south inbox.        Nasra Herron MS, PA-C

## 2019-10-08 ENCOUNTER — HOSPITAL ENCOUNTER (OUTPATIENT)
Dept: NUCLEAR MEDICINE | Facility: CLINIC | Age: 69
Setting detail: NUCLEAR MEDICINE
End: 2019-10-08
Attending: PHYSICIAN ASSISTANT
Payer: MEDICARE

## 2019-10-08 ENCOUNTER — HOSPITAL ENCOUNTER (OUTPATIENT)
Dept: CARDIOLOGY | Facility: CLINIC | Age: 69
Discharge: HOME | End: 2019-10-08
Attending: PHYSICIAN ASSISTANT | Admitting: PHYSICIAN ASSISTANT
Payer: MEDICARE

## 2019-10-08 PROCEDURE — 34300033 ZZH RX 343: Performed by: PHYSICIAN ASSISTANT

## 2019-10-08 PROCEDURE — 78452 HT MUSCLE IMAGE SPECT MULT: CPT | Mod: 26 | Performed by: INTERNAL MEDICINE

## 2019-10-08 PROCEDURE — 93017 CV STRESS TEST TRACING ONLY: CPT

## 2019-10-08 PROCEDURE — 93016 CV STRESS TEST SUPVJ ONLY: CPT | Performed by: INTERNAL MEDICINE

## 2019-10-08 PROCEDURE — 25000128 H RX IP 250 OP 636

## 2019-10-08 PROCEDURE — 78452 HT MUSCLE IMAGE SPECT MULT: CPT

## 2019-10-08 PROCEDURE — A9502 TC99M TETROFOSMIN: HCPCS | Performed by: PHYSICIAN ASSISTANT

## 2019-10-08 PROCEDURE — 93018 CV STRESS TEST I&R ONLY: CPT | Performed by: INTERNAL MEDICINE

## 2019-10-08 RX ORDER — DOBUTAMINE HYDROCHLORIDE 200 MG/100ML
INJECTION INTRAVENOUS
Status: DISCONTINUED
Start: 2019-10-08 | End: 2019-10-09 | Stop reason: HOSPADM

## 2019-10-08 RX ORDER — REGADENOSON 0.08 MG/ML
INJECTION, SOLUTION INTRAVENOUS
Status: COMPLETED
Start: 2019-10-08 | End: 2019-10-08

## 2019-10-08 RX ADMIN — TETROFOSMIN 31.6 MCI.: 1.38 INJECTION, POWDER, LYOPHILIZED, FOR SOLUTION INTRAVENOUS at 12:24

## 2019-10-08 RX ADMIN — TETROFOSMIN 10.5 MCI.: 1.38 INJECTION, POWDER, LYOPHILIZED, FOR SOLUTION INTRAVENOUS at 11:03

## 2019-10-08 RX ADMIN — REGADENOSON 0.4 MG: 0.08 INJECTION, SOLUTION INTRAVENOUS at 12:18

## 2019-10-08 NOTE — PROGRESS NOTES
Pre-procedure:  Are you having any pain or shortness of breath (prior to starting)? denies  Initial vital signs: /79, HR 69, RR18  Allergies reviewed: yes   Rhythm: Sinus  Medications taken within 48 hours of procedure: N/A   Any nitrates within the last 48 hours:denies  Last Caffeine: yesterday  Lung sounds: CTA, no wheezing, crackles or rtx  Health History (COPD, Asthma, etc): denies           Procedure: Lexiscan  Reaction/symptoms after receiving Yolande injection: GI upset  Intensity of Pain:   Rhythm: inus  1. Vital Signs:/59, HR 99, RR 18  2. Vital Signs:/68 , HR 94 RR 18    Reversal agent: N/A    Post:   Resolution of symptoms?: YES  Vital signs: /73, HR 86,  RR 18  Rhythm: sinus  Walk: NO  Comment: Pt reports she is back to baseline, denies SOB or nausea remaining.  Return to Radiology

## 2019-10-10 ENCOUNTER — TELEPHONE (OUTPATIENT)
Dept: FAMILY MEDICINE | Facility: CLINIC | Age: 69
End: 2019-10-10

## 2019-10-10 NOTE — TELEPHONE ENCOUNTER
Reason for Call:  Other results    Detailed comments: Patient returning phone call for results, no RN available. Patient states that she has seen the stress test results and did not have any questions. She does not need a call back unless there is anything concerning.    Phone Number Patient can be reached at: Home number on file [phone] (home)    Best Time: anytime    Can we leave a detailed message on this number? NO    Call taken on 10/10/2019 at 3:56 PM by López PACHECO

## 2019-11-04 ENCOUNTER — HOSPITAL ENCOUNTER (OUTPATIENT)
Dept: MAMMOGRAPHY | Facility: CLINIC | Age: 69
Discharge: HOME | End: 2019-11-04
Attending: PHYSICIAN ASSISTANT | Admitting: PHYSICIAN ASSISTANT
Payer: MEDICARE

## 2019-11-04 DIAGNOSIS — Z12.31 VISIT FOR SCREENING MAMMOGRAM: ICD-10-CM

## 2019-11-04 PROCEDURE — 77063 BREAST TOMOSYNTHESIS BI: CPT

## 2019-11-04 NOTE — RESULT ENCOUNTER NOTE
Blanquita  Here are your recent results.  They are normal.  If you have any questions please do not hesitate to contact our office via phone ([phone]) or MyChart.    Nasra Herron MS, PA-C  [facility]

## 2020-01-30 PROBLEM — M70.62 TROCHANTERIC BURSITIS OF LEFT HIP: Status: RESOLVED | Noted: 2019-08-01 | Resolved: 2020-01-30

## 2020-02-14 ENCOUNTER — OFFICE VISIT (OUTPATIENT)
Dept: FAMILY MEDICINE | Facility: CLINIC | Age: 70
End: 2020-02-14
Payer: MEDICARE

## 2020-02-14 VITALS
OXYGEN SATURATION: 95 % | TEMPERATURE: 97.4 F | HEIGHT: 64 IN | BODY MASS INDEX: 35.17 KG/M2 | HEART RATE: 70 BPM | DIASTOLIC BLOOD PRESSURE: 82 MMHG | SYSTOLIC BLOOD PRESSURE: 136 MMHG | WEIGHT: 206 LBS

## 2020-02-14 DIAGNOSIS — K42.9 REDUCIBLE UMBILICAL HERNIA: ICD-10-CM

## 2020-02-14 DIAGNOSIS — C54.1 ENDOMETRIAL CANCER (H): ICD-10-CM

## 2020-02-14 DIAGNOSIS — E66.01 MORBID OBESITY (H): ICD-10-CM

## 2020-02-14 DIAGNOSIS — E87.0 HYPERNATREMIA: ICD-10-CM

## 2020-02-14 DIAGNOSIS — S32.010A COMPRESSION FRACTURE OF L1 VERTEBRA, INITIAL ENCOUNTER (H): ICD-10-CM

## 2020-02-14 DIAGNOSIS — J32.9 SINUSITIS, UNSPECIFIED CHRONICITY, UNSPECIFIED LOCATION: ICD-10-CM

## 2020-02-14 DIAGNOSIS — R30.0 DYSURIA: Primary | ICD-10-CM

## 2020-02-14 DIAGNOSIS — Z90.81 S/P SPLENECTOMY: ICD-10-CM

## 2020-02-14 DIAGNOSIS — R82.90 ABNORMAL URINE: ICD-10-CM

## 2020-02-14 DIAGNOSIS — R14.0 ABDOMINAL DISTENTION: ICD-10-CM

## 2020-02-14 LAB
ALBUMIN UR-MCNC: 30 MG/DL
ANION GAP SERPL CALCULATED.3IONS-SCNC: 6 MMOL/L (ref 3–14)
APPEARANCE UR: CLEAR
BACTERIA #/AREA URNS HPF: ABNORMAL /HPF
BASOPHILS # BLD AUTO: 0 10E9/L (ref 0–0.2)
BASOPHILS NFR BLD AUTO: 0.2 %
BILIRUB UR QL STRIP: NEGATIVE
BUN SERPL-MCNC: 16 MG/DL (ref 7–30)
CALCIUM SERPL-MCNC: 8.6 MG/DL (ref 8.5–10.1)
CHLORIDE SERPL-SCNC: 110 MMOL/L (ref 94–109)
CO2 SERPL-SCNC: 24 MMOL/L (ref 20–32)
COLOR UR AUTO: YELLOW
CREAT SERPL-MCNC: 0.71 MG/DL (ref 0.52–1.04)
DIFFERENTIAL METHOD BLD: NORMAL
EOSINOPHIL # BLD AUTO: 0.2 10E9/L (ref 0–0.7)
EOSINOPHIL NFR BLD AUTO: 1.5 %
ERYTHROCYTE [DISTWIDTH] IN BLOOD BY AUTOMATED COUNT: 14.9 % (ref 10–15)
GFR SERPL CREATININE-BSD FRML MDRD: 85 ML/MIN/{1.73_M2}
GLUCOSE SERPL-MCNC: 100 MG/DL (ref 70–99)
GLUCOSE UR STRIP-MCNC: NEGATIVE MG/DL
HCT VFR BLD AUTO: 39.4 % (ref 35–47)
HGB BLD-MCNC: 12.4 G/DL (ref 11.7–15.7)
HGB UR QL STRIP: ABNORMAL
KETONES UR STRIP-MCNC: NEGATIVE MG/DL
LEUKOCYTE ESTERASE UR QL STRIP: ABNORMAL
LYMPHOCYTES # BLD AUTO: 2.3 10E9/L (ref 0.8–5.3)
LYMPHOCYTES NFR BLD AUTO: 20.8 %
MCH RBC QN AUTO: 29.7 PG (ref 26.5–33)
MCHC RBC AUTO-ENTMCNC: 31.5 G/DL (ref 31.5–36.5)
MCV RBC AUTO: 95 FL (ref 78–100)
MONOCYTES # BLD AUTO: 1.3 10E9/L (ref 0–1.3)
MONOCYTES NFR BLD AUTO: 12 %
NEUTROPHILS # BLD AUTO: 7.1 10E9/L (ref 1.6–8.3)
NEUTROPHILS NFR BLD AUTO: 65.5 %
NITRATE UR QL: NEGATIVE
NON-SQ EPI CELLS #/AREA URNS LPF: ABNORMAL /LPF
PH UR STRIP: 7 PH (ref 5–7)
PLATELET # BLD AUTO: 340 10E9/L (ref 150–450)
POTASSIUM SERPL-SCNC: 3.9 MMOL/L (ref 3.4–5.3)
RBC # BLD AUTO: 4.17 10E12/L (ref 3.8–5.2)
RBC #/AREA URNS AUTO: ABNORMAL /HPF
SODIUM SERPL-SCNC: 140 MMOL/L (ref 133–144)
SOURCE: ABNORMAL
SP GR UR STRIP: >1.03 (ref 1–1.03)
UROBILINOGEN UR STRIP-ACNC: 0.2 EU/DL (ref 0.2–1)
WBC # BLD AUTO: 10.9 10E9/L (ref 4–11)
WBC #/AREA URNS AUTO: ABNORMAL /HPF

## 2020-02-14 PROCEDURE — 36415 COLL VENOUS BLD VENIPUNCTURE: CPT | Performed by: PHYSICIAN ASSISTANT

## 2020-02-14 PROCEDURE — 85025 COMPLETE CBC W/AUTO DIFF WBC: CPT | Performed by: PHYSICIAN ASSISTANT

## 2020-02-14 PROCEDURE — 81001 URINALYSIS AUTO W/SCOPE: CPT | Performed by: NURSE PRACTITIONER

## 2020-02-14 PROCEDURE — 80048 BASIC METABOLIC PNL TOTAL CA: CPT | Performed by: PHYSICIAN ASSISTANT

## 2020-02-14 PROCEDURE — 87186 SC STD MICRODIL/AGAR DIL: CPT | Performed by: NURSE PRACTITIONER

## 2020-02-14 PROCEDURE — 87088 URINE BACTERIA CULTURE: CPT | Performed by: NURSE PRACTITIONER

## 2020-02-14 PROCEDURE — 99214 OFFICE O/P EST MOD 30 MIN: CPT | Performed by: NURSE PRACTITIONER

## 2020-02-14 PROCEDURE — 87086 URINE CULTURE/COLONY COUNT: CPT | Performed by: NURSE PRACTITIONER

## 2020-02-14 ASSESSMENT — MIFFLIN-ST. JEOR: SCORE: 1434.41

## 2020-02-14 NOTE — PATIENT INSTRUCTIONS
Patient Education     Treating Constipation    Constipation is a common and often uncomfortable problem. Constipation means you have bowel movements fewer than 3 times per week, or strain to pass hard, dry stool. It can last a short time. Or it can be a problem that never seems to go away. The good news is that it can often be treated and controlled.  Eat more fiber  One of the best ways to help treat constipation is to increase your fiber intake. You can do this either through diet or by using fiber supplements. Fiber (in whole grains, fruits, and vegetables) adds bulk and absorbs water to soften the stool. This helps the stool pass through the colon more easily. When you increase your fiber intake, do it slowly to avoid side effects such as bloating. Also increase the amount of water that you drink. Eating more of the following foods can add fiber to your diet.    High-fiber cereals    Whole grains, bran, and brown rice    Vegetables such as carrots, broccoli, and greens    Fresh fruits (especially apples, pears, and dried fruits like raisins and apricots)    Nuts and legumes (especially beans such as lentils, kidney beans, and lima beans)  Get physically active  Exercise helps improve the working of your colon which helps ease constipation. Try to get some physical activity every day. If you haven t been active for a while, talk to your healthcare provider before starting again.  Laxatives  Your healthcare provider may suggest an over-the-counter product to help ease your constipation. He or she may suggest the use of bulk-forming agents or laxatives. The use of laxatives, if used as directed, is common and safe. Follow directions carefully when using them. See your healthcare provider for new-onset constipation, or long-term constipation, to rule out other causes such as medicines or thyroid disease.  Date Last Reviewed: 7/1/2016 2000-2019 [employer]. [address] [facility] [facility], [place]  [address]. All rights reserved. This information is not intended as a substitute for professional medical care. Always follow your healthcare professional's instructions.           Patient Education     Urinary Tract Infections in Women    Urinary tract infections (UTIs) are most often caused by bacteria. These bacteria enter the urinary tract. The bacteria may come from outside the body. Or they may travel from the skin outside the rectum or vagina into the urethra. Female anatomy makes it easy for bacteria from the bowel to enter a woman s urinary tract, which is the most common source of UTI. This means women develop UTIs more often than men. Pain in or around the urinary tract is a common UTI symptom. But the only way to know for sure if you have a UTI for the healthcare provider to test your urine. The two tests that may be done are the urinalysis and urine culture.  Types of UTIs    Cystitis. A bladder infection (cystitis) is the most common UTI in women. You may have urgent or frequent urination. You may also have pain, burning when you urinate, and bloody urine.    Urethritis. This is an inflamed urethra, which is the tube that carries urine from the bladder to outside the body. You may have lower stomach or back pain. You may also have urgent or frequent urination.    Pyelonephritis. This is a kidney infection. If not treated, it can be serious and damage your kidneys. In severe cases, you may need to stay in the hospital. You may have a fever and lower back pain.  Medicines to treat a UTI  Most UTIs are treated with antibiotics. These kill the bacteria. The length of time you need to take them depends on the type of infection. It may be as short as 3 days. If you have repeated UTIs, you may need a low-dose antibiotic for several months. Take antibiotics exactly as directed. Don t stop taking them until all of the medicine is gone. If you stop taking the antibiotic too soon, the infection may not go away. You  may also develop a resistance to the antibiotic. This can make it much harder to treat.  Lifestyle changes to treat and prevent UTIs  The lifestyle changes below will help get rid of your UTI. They may also help prevent future UTIs.    Drink plenty of fluids. This includes water, juice, or other caffeine-free drinks. Fluids help flush bacteria out of your body.    Empty your bladder. Always empty your bladder when you feel the urge to urinate. And always urinate before going to sleep. Urine that stays in your bladder can lead to infection. Try to urinate before and after sex as well.    Practice good personal hygiene. Wipe yourself from front to back after using the toilet. This helps keep bacteria from getting into the urethra.    Use condoms during sex. These help prevent UTIs caused by sexually transmitted bacteria. Also don't use spermicides during sex. These can increase the risk for UTIs. Choose other forms of birth control instead. For women who tend to get UTIs after sex, a low-dose of a preventive antibiotic may be used. Be sure to discuss this option with your healthcare provider.    Follow up with your healthcare provider as directed. He or she may test to make sure the infection has cleared. If needed, more treatment may be started.  Date Last Reviewed: 1/1/2017 2000-2019 [employer]. [address] [place] [address], [place] [address]. All rights reserved. This information is not intended as a substitute for professional medical care. Always follow your healthcare professional's instructions.
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
## 2020-02-16 LAB
BACTERIA SPEC CULT: ABNORMAL
BACTERIA SPEC CULT: ABNORMAL
SPECIMEN SOURCE: ABNORMAL

## 2020-02-17 NOTE — RESULT ENCOUNTER NOTE
Blanquita  I have reviewed your recent labs. Here are the results:    -Kidney function is stable (Cr, GFR), Sodium is normal, Potassium is normal, Calcium is normal, Glucose is normal.     If you have any questions please do not hesitate to contact our office via phone ([phone]) or MyChart.    Nasra Herron, MS, PA-C  [facility]

## 2020-02-17 NOTE — RESULT ENCOUNTER NOTE
Dear Blanquita,    Here is a summary of your recent test results:    -Urine culture is abnormal and grew out bacteria that are sensitive to the antibiotic you have been given.  Complete the medication as prescribed and if you experience new, worsening or persistent symptoms, you should call or return for a recheck.    For additional lab test information, labtestsonline.org is an excellent reference.    In addition, here is a list of due or overdue Health Maintenance reminders:    Zoster (Shingles) Vaccine(2 of 3) due on 11/24/2012  Flu Vaccine(1) due on 09/01/2019  PHQ-2 due on 01/01/2020    Please call us at [phone] (or use [employer]) to address the above recommendations if needed.    Thank you for choosing [facility].  It was an honor and a privilege to participate in your care.       Healthy regards,    Julia Mills, CANELO  [facility]

## 2020-02-19 ENCOUNTER — HOSPITAL ENCOUNTER (OUTPATIENT)
Dept: CT IMAGING | Facility: CLINIC | Age: 70
Discharge: HOME | End: 2020-02-19
Attending: NURSE PRACTITIONER | Admitting: NURSE PRACTITIONER
Payer: MEDICARE

## 2020-02-19 DIAGNOSIS — R14.0 ABDOMINAL DISTENTION: ICD-10-CM

## 2020-02-19 PROCEDURE — 74177 CT ABD & PELVIS W/CONTRAST: CPT

## 2020-02-19 PROCEDURE — 25000128 H RX IP 250 OP 636: Performed by: NURSE PRACTITIONER

## 2020-02-19 RX ORDER — IOPAMIDOL 755 MG/ML
100 INJECTION, SOLUTION INTRAVASCULAR ONCE
Status: COMPLETED | OUTPATIENT
Start: 2020-02-19 | End: 2020-02-19

## 2020-02-19 RX ADMIN — IOPAMIDOL 100 ML: 755 INJECTION, SOLUTION INTRAVENOUS at 15:15

## 2020-02-20 ENCOUNTER — TELEPHONE (OUTPATIENT)
Dept: FAMILY MEDICINE | Facility: CLINIC | Age: 70
End: 2020-02-20

## 2020-02-20 DIAGNOSIS — K80.20 GALLSTONES: Primary | ICD-10-CM

## 2020-02-20 DIAGNOSIS — K43.9 VENTRAL HERNIA: ICD-10-CM

## 2020-02-20 DIAGNOSIS — K42.0 UMBILICAL HERNIA WITH OBSTRUCTION: ICD-10-CM

## 2020-02-20 NOTE — TELEPHONE ENCOUNTER
Routing to provider for review.  This writer relayed the results, however patient was asking questions related to surgery and what the recovery time is and what surgery was involved.  Attempted to explain that is the reason for the surgery consult.    Routing to PCP and ordering provider for review.    JOSUÉ SkyN, RN  [employer] [facility]

## 2020-02-20 NOTE — RESULT ENCOUNTER NOTE
Note to Staff: please call the patient to explain results.      Her abdominal/pelvis CT shows gallstones and two hernias;moderate-sized ventral hernia containing fat and nondilated transverse colon loop and a small fat-containing umbilical hernia. When a hernia contains bowel it could lead to incarceration or obstruction of her bowel which would be an emergency. Let's get her into general surgery as soon as possible to discuss her gallstones and her hernia/ CT findings. If she has any sudden onset several abdominal pain, with or without fever, N/V she should be seen in ER. Please place a referral to general surgery ideally this week or early next.     She is constipated. Have her increase her fiber intake and water intake for the constipation. She may also try a daily scoop/packet of Miralax for the next 7 days then she can start metamucil daily.     She also has decreased height loss at L1 consistent with compression fracture. If she has back pain we can get her into PT. Please order Dexascan.      IMPRESSION:  1. Moderate-sized ventral/supraumbilical hernia containing fat and  nondilated transverse colon loop.  2. Moderate amount of stool throughout the colon, nonspecific, can be  seen with constipation.  3. Cholelithiasis without CT evidence of acute cholecystitis.  4. The spleen is not visualized, likely surgically absent. There is a  small splenule in the left upper quadrant.  5. Age indeterminant, likely chronic L1 compression deformity with  approximately 50% vertebral height loss.    Julia Mills, FNP-BC

## 2020-02-20 NOTE — TELEPHONE ENCOUNTER
Reason for Call:  Request for results:    Name of test or procedure: CT abdomen and pelvis with contrast. The patient says she wants Nasra to call her to explain these results if possible. She said the last person couldn't answer any of her questions over the phone.    Date of test of procedure: 02/19/2020    Location of the test or procedure: [facility]    OK to leave the result message on voice mail or with a family member? YES    Phone number Patient can be reached at:  Cell number on file:    Telephone Information:   Mobile [phone]     Call taken on 2/20/2020 at 1:00 PM by Avis Ross

## 2020-02-21 NOTE — TELEPHONE ENCOUNTER
Agree with Julia, the surgery referral was just for a consultation, whether or not her hernia requires surgery would be a discussion between her and the surgeon.  She may be able to monitor/observe.      Nasra Herron MBA, MS, PA-C

## 2020-02-21 NOTE — TELEPHONE ENCOUNTER
Attempt # 1      Message handled by nurse triage.       PLAN: Huddle with provider, Julia Mills, plan includes please call patient to explain that we do not know if she will need surgery right now or what the surgeon will recommend.  We want her to consult with the surgeon so they can advise on what next steps are.      Left non-detailed VM for patient to call back and speak with ZACKERY Sanchez.      JOSUÉ Gutierrez, RN, PHN  [facility]  Office: [phone]  Fax: [phone]

## 2020-02-24 ENCOUNTER — OFFICE VISIT (OUTPATIENT)
Dept: SURGERY | Facility: CLINIC | Age: 70
End: 2020-02-24
Payer: MEDICARE

## 2020-02-24 VITALS
HEIGHT: 64 IN | BODY MASS INDEX: 33.29 KG/M2 | SYSTOLIC BLOOD PRESSURE: 138 MMHG | RESPIRATION RATE: 16 BRPM | OXYGEN SATURATION: 96 % | HEART RATE: 76 BPM | DIASTOLIC BLOOD PRESSURE: 78 MMHG | WEIGHT: 195 LBS

## 2020-02-24 DIAGNOSIS — K43.2 INCISIONAL HERNIA, WITHOUT OBSTRUCTION OR GANGRENE: Primary | ICD-10-CM

## 2020-02-24 PROCEDURE — 99203 OFFICE O/P NEW LOW 30 MIN: CPT | Performed by: SURGERY

## 2020-02-24 ASSESSMENT — MIFFLIN-ST. JEOR: SCORE: 1384.51

## 2020-02-24 NOTE — TELEPHONE ENCOUNTER
Called patient @ # below -     Advised of notes below - Patient stated an understanding and agreed with plan.    Alaina Mejia RN  [facility]
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
## 2020-02-24 NOTE — LETTER
2020       Re: Blanquita THOMAS Zariadavi - 1950    I was asked by Nasra Herron PA-C to evaluate this patient regarding upper abdominal swelling and gallstones.  Blanquita is a 69 year old female who presents for evaluation of upper abdominal bulging. She underwent a surgery of her trauma in .  At that time, she had a laparotomy and a splenectomy.  She also had a muscle flap taken from her left rectus muscle to re-build her leg.  In , the patient had surgery for endometrial carcinoma.  This was done with robotic assistance.  Over the last 9 months or so, the patient is noticed increased gurgling in her abdomen as well as increased bulging.  She denies any pain.  She does feel that if she overeats, the symptoms are worse.     Past Medical History:  has a past medical history of Atypical glandular cells on Pap smear (2011), Congenital anomalies of spleen (), Disorder of bone and cartilage, unspecified, Hyperlipidemia LDL goal <130 (2008), Lumbago (), Osteopenia (), Other acute rheumatic heart disease (), Pain in limb (), and Unspecified arthropathy, pelvic region and thigh ().     ROS:  The 10 point review of systems is negative other than noted in the HPI and above.     PE:    General- Well-developed, well-nourished, patient able to get up on table without difficulty.  HEENT- Normocephalic and atraumatic. Pupils equal and round.  Mucous membranes moist.  Sclera are nonicteric.  Neck- No lymphadenopathy or masses   Respirations- are regular and non labored  Abdomen is abdomen is soft without significant tenderness, masses, organomegaly or guarding  There is a bulge which contains obvious intestine in the upper abdomen along her old midline incision. This is largely reducible.  It is nontender.  There is some vague bulging below this.  There is a paramedian longitudinal scar in the left abdomen.     Recent CT scan showed an incisional hernia containing colon.   Multiple defects are seen.  In addition, there is an incidentally noted gallstone.               Assessment/Plan:  Complex incisional hernia.  The patient is missing a good portion of her left rectus muscle and has multiple defects.  There is an approximate 5 cm diastases and there is a defect which goes all the way across this at one point.  She has at least 4 separate hernia defects.  At this point, the patient is not particularly interested in surgical intervention.  I suggested she could try a binder.  If her hernia is progressing, we might need to consider some sort of repair.  This might best be approached with a robotic intraperitoneal onlay mesh.  An open approach could be considered, but might be challenging given the lack of a left rectus muscle.  The patient will see how she does over the next few months.  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  She understands she needs to seek urgent assistance if her hernia ever gets stuck out.           Kolby Cole MD

## 2020-02-27 ENCOUNTER — TELEPHONE (OUTPATIENT)
Dept: FAMILY MEDICINE | Facility: CLINIC | Age: 70
End: 2020-02-27

## 2020-02-27 DIAGNOSIS — N76.0 VAGINITIS AND VULVOVAGINITIS: Primary | ICD-10-CM

## 2020-02-27 NOTE — TELEPHONE ENCOUNTER
Reason for call:  Patient reporting a symptom    Symptom or request: yeast infection - vaginal discharge. Patient states that she finished antibiotics for her UTI a couple days ago but now has a yeast infection. Patient would like to know if she can get a 1 day pill (pharmacy suggested this) or what her other options are.    Preferred pharmacy: [facility] in [place]     Duration (how long have symptoms been present): 1 day    Have you been treated for this before? No    Additional comments: none    Phone Number patient can be reached at:  Cell number on file:    Telephone Information:   Mobile [phone]       Best Time:  anytime    Can we leave a detailed message on this number:  YES    Call taken on 2/27/2020 at 2:45 PM by López PACHECO

## 2020-02-28 RX ORDER — FLUCONAZOLE 150 MG/1
150 TABLET ORAL ONCE
Qty: 1 TABLET | Refills: 0 | Status: SHIPPED | OUTPATIENT
Start: 2020-02-28 | End: 2020-04-16

## 2020-02-28 NOTE — TELEPHONE ENCOUNTER
Routing to Julia Mills to review and advise.  Order pending for Diflucan.      JOSUÉ Gutierrez, RN, PHN  [facility]  Office: [phone]  Fax: [phone]

## 2020-02-28 NOTE — TELEPHONE ENCOUNTER
Yes this is okay- script sent to pharmacy.  If symptoms persist or worsen I encouraged her to be seen.      Julia Mills, FNP-BC

## 2020-02-28 NOTE — TELEPHONE ENCOUNTER
Patient notified by phone.    Patient verbalized understanding and agreed with plan.        JOSUÉ Gutierrez, RN, PHN  [facility]  Office: [phone]  Fax: [phone]

## 2020-03-22 ENCOUNTER — HEALTH MAINTENANCE LETTER (OUTPATIENT)
Age: 70
End: 2020-03-22

## 2020-04-16 ENCOUNTER — VIRTUAL VISIT (OUTPATIENT)
Dept: FAMILY MEDICINE | Facility: CLINIC | Age: 70
End: 2020-04-16
Payer: MEDICARE

## 2020-04-16 DIAGNOSIS — N30.00 ACUTE CYSTITIS WITHOUT HEMATURIA: Primary | ICD-10-CM

## 2020-04-16 PROCEDURE — G2012 BRIEF CHECK IN BY MD/QHP: HCPCS | Performed by: PHYSICIAN ASSISTANT

## 2020-04-16 RX ORDER — MULTIPLE VITAMINS W/ MINERALS TAB 9MG-400MCG
1 TAB ORAL DAILY
COMMUNITY

## 2020-04-16 RX ORDER — CEPHALEXIN 500 MG/1
500 CAPSULE ORAL 2 TIMES DAILY
Qty: 14 CAPSULE | Refills: 0 | Status: SHIPPED | OUTPATIENT
Start: 2020-04-16 | End: 2020-07-09
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
## 2020-06-11 ENCOUNTER — TELEPHONE (OUTPATIENT)
Dept: FAMILY MEDICINE | Facility: CLINIC | Age: 70
End: 2020-06-11

## 2020-06-11 DIAGNOSIS — K43.9 VENTRAL HERNIA WITHOUT OBSTRUCTION OR GANGRENE: Primary | ICD-10-CM

## 2020-06-11 NOTE — TELEPHONE ENCOUNTER
Yes that is just fine. Referral signed.     Please send/fax referral to [facility] for her second opinion please.         Julia Mills, CANELO-BC

## 2020-06-11 NOTE — TELEPHONE ENCOUNTER
Kenyon Dumont- Patient is calling and asking for a referral to [facility] for second opinion on her hernia. You did refer her to [facility] and she did see them. She would just like a second opinion. Please advise. Thanks  Pending referral.     Clary Feliz  Referral Coordinator

## 2020-07-09 ENCOUNTER — OFFICE VISIT (OUTPATIENT)
Dept: FAMILY MEDICINE | Facility: CLINIC | Age: 70
End: 2020-07-09
Payer: MEDICARE

## 2020-07-09 VITALS
HEART RATE: 81 BPM | WEIGHT: 208 LBS | DIASTOLIC BLOOD PRESSURE: 72 MMHG | TEMPERATURE: 98.2 F | HEIGHT: 64 IN | SYSTOLIC BLOOD PRESSURE: 130 MMHG | OXYGEN SATURATION: 97 % | BODY MASS INDEX: 35.51 KG/M2

## 2020-07-09 DIAGNOSIS — R82.90 NONSPECIFIC FINDING ON EXAMINATION OF URINE: ICD-10-CM

## 2020-07-09 DIAGNOSIS — N30.01 ACUTE CYSTITIS WITH HEMATURIA: Primary | ICD-10-CM

## 2020-07-09 DIAGNOSIS — R30.0 DYSURIA: ICD-10-CM

## 2020-07-09 LAB
ALBUMIN UR-MCNC: 30 MG/DL
APPEARANCE UR: CLEAR
BACTERIA #/AREA URNS HPF: ABNORMAL /HPF
BILIRUB UR QL STRIP: NEGATIVE
COLOR UR AUTO: YELLOW
GLUCOSE UR STRIP-MCNC: NEGATIVE MG/DL
HGB UR QL STRIP: ABNORMAL
KETONES UR STRIP-MCNC: NEGATIVE MG/DL
LEUKOCYTE ESTERASE UR QL STRIP: ABNORMAL
NITRATE UR QL: POSITIVE
PH UR STRIP: 5.5 PH (ref 5–7)
RBC #/AREA URNS AUTO: ABNORMAL /HPF
SOURCE: ABNORMAL
SP GR UR STRIP: 1.02 (ref 1–1.03)
UROBILINOGEN UR STRIP-ACNC: 1 EU/DL (ref 0.2–1)
WBC #/AREA URNS AUTO: >100 /HPF

## 2020-07-09 PROCEDURE — 81001 URINALYSIS AUTO W/SCOPE: CPT | Performed by: NURSE PRACTITIONER

## 2020-07-09 PROCEDURE — 87186 SC STD MICRODIL/AGAR DIL: CPT | Performed by: NURSE PRACTITIONER

## 2020-07-09 PROCEDURE — 87088 URINE BACTERIA CULTURE: CPT | Performed by: NURSE PRACTITIONER

## 2020-07-09 PROCEDURE — 87086 URINE CULTURE/COLONY COUNT: CPT | Performed by: NURSE PRACTITIONER

## 2020-07-09 PROCEDURE — 99213 OFFICE O/P EST LOW 20 MIN: CPT | Performed by: NURSE PRACTITIONER

## 2020-07-09 RX ORDER — CEPHALEXIN 500 MG/1
500 CAPSULE ORAL 2 TIMES DAILY
Qty: 14 CAPSULE | Refills: 0 | Status: SHIPPED | OUTPATIENT
Start: 2020-07-09 | End: 2020-07-16

## 2020-07-09 ASSESSMENT — MIFFLIN-ST. JEOR: SCORE: 1443.48

## 2020-07-09 NOTE — PROGRESS NOTES
"Subjective   Blanquita Trevizo is a 69 year old female who presents to clinic today for the following health issues:    HPI   URINARY TRACT SYMPTOMS      Duration: 4 days     Description  Not being able to empty bladder, waking up at night more frequently.     Intensity:  mild    Accompanying signs and symptoms:  Fever/chills: no   Flank pain no   Nausea and vomiting: no   Vaginal symptoms: none  Abdominal/Pelvic Pain: no     History  History of frequent UTI's: no   History of kidney stones: no   Sexually Active: no   Possibility of pregnancy: No    Precipitating or alleviating factors: None    Therapies tried and outcome: none   Outcome: n/a      Reviewed and updated as needed this visit by provider:  Tobacco  Allergies  Meds  Problems  Med Hx  Surg Hx  Fam Hx         Review of Systems   Constitutional, HEENT, cardiovascular, pulmonary, GI, , musculoskeletal, neuro, skin, endocrine and psych systems are negative, except as otherwise noted per HPI.      Objective   /72 (BP Location: Left arm, Cuff Size: Adult Regular)   Pulse 81   Temp 98.2  F (36.8  C) (Tympanic)   Ht 1.626 m (5' 4\")   Wt 94.3 kg (208 lb)   SpO2 97%   BMI 35.70 kg/m   Body mass index is 35.7 kg/m .  Physical Exam   GENERAL: healthy, alert, well nourished, well hydrated, no distress  RESP: lungs clear to auscultation - no rales, no rhonchi, no wheezes  CV: regular rates and rhythm, normal S1 S2, no S3 or S4 and no murmur, no click or rub -  ABDOMEN: soft, no tenderness, no  hepatosplenomegaly, no masses, normal bowel sounds  BACK: no CVA tenderness, no paralumbar tenderness    Diagnostic Test Results  Urinalysis -positive UTI      Assessment & Plan   Blanquita was seen today for uti.    Diagnoses and all orders for this visit:    Acute cystitis with hematuria  -     cephALEXin (KEFLEX) 500 MG capsule; Take 1 capsule (500 mg) by mouth 2 times daily for 7 days    Dysuria  -     *UA reflex to Microscopic and Culture ([place] [facility] [place] " [facility]ics (except [place] and [place])  -     Urine Microscopic    Nonspecific finding on examination of urine  -     Urine Culture Aerobic Bacterial             See Patient Instructions    Return in about 1 week (around 7/16/2020) for If acute symptoms are persistenting or worsening..            MARCI Mahmood     [address] [address]  [email or web address]   Office: [phone]

## 2020-07-10 LAB
BACTERIA SPEC CULT: ABNORMAL
SPECIMEN SOURCE: ABNORMAL

## 2020-08-03 ENCOUNTER — TRANSFERRED RECORDS (OUTPATIENT)
Dept: HEALTH INFORMATION MANAGEMENT | Facility: CLINIC | Age: 70
End: 2020-08-03

## 2020-08-04 ENCOUNTER — TELEPHONE (OUTPATIENT)
Dept: FAMILY MEDICINE | Facility: CLINIC | Age: 70
End: 2020-08-04

## 2020-08-04 DIAGNOSIS — R82.90 NONSPECIFIC FINDING ON EXAMINATION OF URINE: Primary | ICD-10-CM

## 2020-08-04 DIAGNOSIS — R30.0 DYSURIA: ICD-10-CM

## 2020-08-04 DIAGNOSIS — N30.01 ACUTE CYSTITIS WITH HEMATURIA: Primary | ICD-10-CM

## 2020-08-04 DIAGNOSIS — R30.0 DYSURIA: Primary | ICD-10-CM

## 2020-08-04 LAB
ALBUMIN UR-MCNC: NEGATIVE MG/DL
APPEARANCE UR: CLEAR
BACTERIA #/AREA URNS HPF: ABNORMAL /HPF
BILIRUB UR QL STRIP: NEGATIVE
COLOR UR AUTO: YELLOW
GLUCOSE UR STRIP-MCNC: NEGATIVE MG/DL
HGB UR QL STRIP: ABNORMAL
KETONES UR STRIP-MCNC: NEGATIVE MG/DL
LEUKOCYTE ESTERASE UR QL STRIP: ABNORMAL
NITRATE UR QL: NEGATIVE
PH UR STRIP: 6.5 PH (ref 5–7)
RBC #/AREA URNS AUTO: ABNORMAL /HPF
SOURCE: ABNORMAL
SP GR UR STRIP: 1.01 (ref 1–1.03)
UROBILINOGEN UR STRIP-ACNC: 0.2 EU/DL (ref 0.2–1)
WBC #/AREA URNS AUTO: ABNORMAL /HPF

## 2020-08-04 PROCEDURE — 87086 URINE CULTURE/COLONY COUNT: CPT | Performed by: NURSE PRACTITIONER

## 2020-08-04 PROCEDURE — 81001 URINALYSIS AUTO W/SCOPE: CPT | Performed by: NURSE PRACTITIONER

## 2020-08-04 RX ORDER — SULFAMETHOXAZOLE/TRIMETHOPRIM 800-160 MG
1 TABLET ORAL 2 TIMES DAILY
Qty: 14 TABLET | Refills: 0 | Status: SHIPPED | OUTPATIENT
Start: 2020-08-04 | End: 2020-08-11

## 2020-08-04 NOTE — TELEPHONE ENCOUNTER
I will put in follow up UA and she can leave sample. Will treat if necessary.    Julia Castellanos, CNP

## 2020-08-04 NOTE — TELEPHONE ENCOUNTER
Symptoms  Describe your symptoms:  UTI symptoms of freg urination and pressure to go often doesn't void bladder drinking enough liquids feels a little burning.                                         Any pain: No  Slight  How long have you been having symptoms: Since beginning of July    Have you been seen for this:  Yes:   Appointment offered?: No  Triage offered?: Yes: I said a team member would call to discuss the next steps with her  Home remedies tried: lots of liquids and completed all her pills  Requested Pharmacy: N/A  Okay to leave a detailed message? No at Cell number on file:    Telephone Information:   Mobile [phone]

## 2020-08-04 NOTE — TELEPHONE ENCOUNTER
"Returned call to patient and advised of providers response below.  Patient stated she already came to clinic and left a urine sample, \"it was either that or the minute clinic\".    [facility] [place]  [facility]    "

## 2020-08-04 NOTE — TELEPHONE ENCOUNTER
"Returned call to patient.  Patient was treated for a UTI on 7/9/2020 and states her symptoms never fully resolved and continues to have the following symptoms.    Symptoms: discomfort unable to urinate when she wants to, flow is inconsistent unable to urinate that much, patient feels like she has to urinate all the time but is not able to.    Advised patient of providers note to return to clinic for persistent symptoms and patient stated \"can't I just come in for a lab? What's the big deal\".    Routing to provider for review and advise as appropriate.    Patient can be reached at: [phone], ok to leave a detailed message.      JOSUÉ SkyN, RN  [employer] [facility] [facility]    "

## 2020-08-05 LAB
BACTERIA SPEC CULT: NORMAL
SPECIMEN SOURCE: NORMAL

## 2020-08-28 ENCOUNTER — ANCILLARY PROCEDURE (OUTPATIENT)
Dept: BONE DENSITY | Facility: CLINIC | Age: 70
End: 2020-08-28
Attending: NURSE PRACTITIONER
Payer: MEDICARE

## 2020-08-28 DIAGNOSIS — S32.010A COMPRESSION FRACTURE OF L1 VERTEBRA, INITIAL ENCOUNTER (H): ICD-10-CM

## 2020-08-28 PROCEDURE — 77080 DXA BONE DENSITY AXIAL: CPT | Performed by: INTERNAL MEDICINE

## 2020-09-08 NOTE — RESULT ENCOUNTER NOTE
Dear Blanquita,    Here is a summary of your recent test results:    FINDINGS:               Lumbar Spine (L2-L4)      T-score:  0.4, marked degenerative changes present, L1 is excluded due to fracture                Left Femoral Neck            T-score:  -1.3               Right Femoral Neck          T-score:  -1.5               Lumbar (L2-L4) BMD: 1.263  Previous: 1.269                          Total Hip Mean BMD: 0.841  Previous: 0.835     Comparison is made to another DXA performed on the same [employer]  machine on 10/31/2018.       IMPRESSION  Osteopenia. Possible severe osteoporosis if the spinal fracture is not considered traumatic.     Your bone density scan is consistent with osteopenia in your hips (thinning of bone) with the possibility of osteoporosis in your spine.   I would encourage continued vitamin D and calcium supplementation taking 1200 mg-1500 mg (post-menopausal) of calcium +D (Caltrate D or generic equivalent) twice daily which can help to prevent progression of bone thinning in addition to weight bearing exercise at least 3 times per week.  We should plan to repeat your bone density scan in 3 to 5 years. We will also discuss at your upcoming office visit.     For additional lab test information, labtestsonline.org is an excellent reference.    In addition, here is a list of due or overdue Health Maintenance reminders:    Zoster (Shingles) Vaccine(2 of 3) due on 11/24/2012  Annual Wellness Visit due on 08/01/2020  Flu Vaccine(1) due on 09/01/2020    Please call us at [phone] (or use [email or web address]) to address the above recommendations if needed.    Thank you for choosing  [facility].  It was an honor and a privilege to participate in your care.       Healthy regards,    Julia Mills, CANELO  [facility]

## 2020-09-11 ENCOUNTER — OFFICE VISIT (OUTPATIENT)
Dept: FAMILY MEDICINE | Facility: CLINIC | Age: 70
End: 2020-09-11
Payer: MEDICARE

## 2020-09-11 VITALS
WEIGHT: 209 LBS | BODY MASS INDEX: 35.68 KG/M2 | HEART RATE: 71 BPM | OXYGEN SATURATION: 96 % | SYSTOLIC BLOOD PRESSURE: 145 MMHG | TEMPERATURE: 98 F | DIASTOLIC BLOOD PRESSURE: 81 MMHG | HEIGHT: 64 IN

## 2020-09-11 DIAGNOSIS — Z00.00 ENCOUNTER FOR MEDICARE ANNUAL WELLNESS EXAM: Primary | ICD-10-CM

## 2020-09-11 DIAGNOSIS — Z23 NEED FOR PROPHYLACTIC VACCINATION AND INOCULATION AGAINST INFLUENZA: ICD-10-CM

## 2020-09-11 DIAGNOSIS — R21 RASH: ICD-10-CM

## 2020-09-11 DIAGNOSIS — Z13.29 SCREENING FOR THYROID DISORDER: ICD-10-CM

## 2020-09-11 DIAGNOSIS — Z90.81 S/P SPLENECTOMY: ICD-10-CM

## 2020-09-11 DIAGNOSIS — E78.5 HYPERLIPIDEMIA LDL GOAL <130: ICD-10-CM

## 2020-09-11 DIAGNOSIS — Z12.31 VISIT FOR SCREENING MAMMOGRAM: ICD-10-CM

## 2020-09-11 DIAGNOSIS — M25.552 HIP PAIN, LEFT: ICD-10-CM

## 2020-09-11 DIAGNOSIS — Z13.0 SCREENING FOR DEFICIENCY ANEMIA: ICD-10-CM

## 2020-09-11 DIAGNOSIS — K42.9 REDUCIBLE UMBILICAL HERNIA: ICD-10-CM

## 2020-09-11 DIAGNOSIS — H91.93 BILATERAL HEARING LOSS, UNSPECIFIED HEARING LOSS TYPE: ICD-10-CM

## 2020-09-11 LAB
ANION GAP SERPL CALCULATED.3IONS-SCNC: 1 MMOL/L (ref 3–14)
BUN SERPL-MCNC: 17 MG/DL (ref 7–30)
CALCIUM SERPL-MCNC: 9.1 MG/DL (ref 8.5–10.1)
CHLORIDE SERPL-SCNC: 108 MMOL/L (ref 94–109)
CHOLEST SERPL-MCNC: 226 MG/DL
CO2 SERPL-SCNC: 29 MMOL/L (ref 20–32)
CREAT SERPL-MCNC: 0.67 MG/DL (ref 0.52–1.04)
ERYTHROCYTE [DISTWIDTH] IN BLOOD BY AUTOMATED COUNT: 15.1 % (ref 10–15)
GFR SERPL CREATININE-BSD FRML MDRD: 88 ML/MIN/{1.73_M2}
GLUCOSE SERPL-MCNC: 101 MG/DL (ref 70–99)
HCT VFR BLD AUTO: 42.1 % (ref 35–47)
HDLC SERPL-MCNC: 64 MG/DL
HGB BLD-MCNC: 13.2 G/DL (ref 11.7–15.7)
LDLC SERPL CALC-MCNC: 143 MG/DL
MCH RBC QN AUTO: 29.9 PG (ref 26.5–33)
MCHC RBC AUTO-ENTMCNC: 31.4 G/DL (ref 31.5–36.5)
MCV RBC AUTO: 96 FL (ref 78–100)
NONHDLC SERPL-MCNC: 162 MG/DL
PLATELET # BLD AUTO: 291 10E9/L (ref 150–450)
POTASSIUM SERPL-SCNC: 4.3 MMOL/L (ref 3.4–5.3)
RBC # BLD AUTO: 4.41 10E12/L (ref 3.8–5.2)
SODIUM SERPL-SCNC: 138 MMOL/L (ref 133–144)
TRIGL SERPL-MCNC: 94 MG/DL
TSH SERPL DL<=0.005 MIU/L-ACNC: 1.61 MU/L (ref 0.4–4)
WBC # BLD AUTO: 6.6 10E9/L (ref 4–11)

## 2020-09-11 PROCEDURE — 99214 OFFICE O/P EST MOD 30 MIN: CPT | Mod: 25 | Performed by: NURSE PRACTITIONER

## 2020-09-11 PROCEDURE — 90662 IIV NO PRSV INCREASED AG IM: CPT | Performed by: NURSE PRACTITIONER

## 2020-09-11 PROCEDURE — 80061 LIPID PANEL: CPT | Performed by: NURSE PRACTITIONER

## 2020-09-11 PROCEDURE — 36415 COLL VENOUS BLD VENIPUNCTURE: CPT | Performed by: NURSE PRACTITIONER

## 2020-09-11 PROCEDURE — 80048 BASIC METABOLIC PNL TOTAL CA: CPT | Performed by: NURSE PRACTITIONER

## 2020-09-11 PROCEDURE — G0439 PPPS, SUBSEQ VISIT: HCPCS | Performed by: NURSE PRACTITIONER

## 2020-09-11 PROCEDURE — 84443 ASSAY THYROID STIM HORMONE: CPT | Performed by: NURSE PRACTITIONER

## 2020-09-11 PROCEDURE — G0008 ADMIN INFLUENZA VIRUS VAC: HCPCS | Performed by: NURSE PRACTITIONER

## 2020-09-11 PROCEDURE — 85027 COMPLETE CBC AUTOMATED: CPT | Performed by: NURSE PRACTITIONER

## 2020-09-11 RX ORDER — TRIAMCINOLONE ACETONIDE 1 MG/G
CREAM TOPICAL 2 TIMES DAILY
Qty: 30 G | Refills: 3 | Status: SHIPPED | OUTPATIENT
Start: 2020-09-11 | End: 2022-02-01

## 2020-09-11 ASSESSMENT — ACTIVITIES OF DAILY LIVING (ADL): CURRENT_FUNCTION: NO ASSISTANCE NEEDED

## 2020-09-11 ASSESSMENT — MIFFLIN-ST. JEOR: SCORE: 1448.02

## 2020-09-11 NOTE — PATIENT INSTRUCTIONS
Patient Education   Personalized Prevention Plan  You are due for the preventive services outlined below.  Your care team is available to assist you in scheduling these services.  If you have already completed any of these items, please share that information with your care team to update in your medical record.  Health Maintenance Due   Topic Date Due     Zoster (Shingles) Vaccine (2 of 3) 11/24/2012     Annual Wellness Visit  08/01/2020     Flu Vaccine (1) 09/01/2020        Patient Education     Hernia (Adult)    A hernia can happen when there is a weakness or defect in the wall of the abdomen or groin. Intestines or nearby tissues may move from their usual location and push through the weakness in the wall. This can cause a hernia (bulge) you may see or feel.  Causes and risk factors   A hernia may be present at birth. Or it may be caused by the wear and tear of daily living. Certain factors can make a hernia more likely. These can include:    Heavy lifting    Straining, whether from lifting, movement, or constipation    Chronic cough    Injury to the abdominal wall    Excess weight    Pregnancy    Prior surgery    Older age    Family history of hernia  Symptoms  Symptoms of a hernia may come on suddenly. Or they may appear slowly over time. Some common symptoms include:    Bulge in the groin area, around the navel, or in the scrotum (the bulge may get bigger when you stand and go away when you lie down)    Pain or pressure around the bulge    Pain during activities such as lifting, coughing, or sneezing    A feeling of weakness or pressure in the groin    Pain or swelling in the scrotum  Types of hernias  There are different types of hernia. The type you have depends on its location:    Inguinal. This type is in the groin or scrotum. It is more common in men. But, women can get this hernia, too.    Femoral. This type is in the groin, upper thigh (where the leg bends), or labia. It is more common in  women.    Ventral. This type is in the abdominal wall.    Umbilical. This type occurs around the navel (belly button).    Incisional. This type occurs at the site of a previous surgery.  The condition of the hernia can help determine how urgently it needs to be treated.    Reducible. It goes back in by itself, or it can be pushed back in.    Irreducible. It can t be pushed back in.    Incarcerated/strangulated. The intestine is trapped (incarcerated). If this happens, you won t be able to push the bulge back in. If the incarcerated hernia isn t treated, it may become strangulated. This means the area loses blood supply and the tissue may die. This requires emergency surgery. You need treatment right away.  In most cases, a hernia will not heal on its own.You may need surgery to repair the defect in the abdominal wall or groin. You ll be told more about surgery, if needed.  If your symptoms are not severe, treatment may sometimes be delayed. In such cases, you will need regular follow-up visits with the provider. You ll be asked to keep track of your symptoms and to watch for signs of more serious problems. You may also be given guidelines similar to the home care instructions below.  Home care  To help keep a hernia from getting worse, you may be advised to:    Avoid heavy lifting and straining as directed.    Take steps to prevent constipation, such as eating more fiber and drinking more water. This may help reduce straining that can occur when having a bowel movement. Reducing straining may help keep your symptoms from getting worse.    Maintain a healthy weight or lose excess weight. This can help reduce strain on abdominal muscles and tissues.    Stop smoking. This can help prevent coughing that may also strain abdominal muscles and tissues.  Follow-up care  Follow up with your healthcare provider, or as directed. If imaging tests were done, they will be reviewed a doctor. You will be told the results and any  new findings that may affect your care.  When to seek medical advice  Call your healthcare provider right away if any of these occur:    Hernia hardens, swells, or grows larger    Hernia can no longer be pushed back in    Pain moves to the lower right abdomen (just below the waistline), or spreads to the back  Call 911  Call 911 if any of these occur:    Severe pain, redness, or tenderness in the area near the hernia    Pain worsens quickly and doesn t get better    Inability to have a bowel movement or pass gas    Fever of 100.4 F (38 C) or higher, or as directed by your healthcare provider  Date Last Reviewed: 3/1/2018    6525-5956 [employer]. [address] [place] [address], [place] [address]. All rights reserved. This information is not intended as a substitute for professional medical care. Always follow your healthcare professional's instructions.
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
## 2020-11-10 ENCOUNTER — HOSPITAL ENCOUNTER (OUTPATIENT)
Dept: MAMMOGRAPHY | Facility: CLINIC | Age: 70
Discharge: HOME | End: 2020-11-10
Attending: NURSE PRACTITIONER | Admitting: NURSE PRACTITIONER
Payer: MEDICARE

## 2020-11-10 DIAGNOSIS — Z12.31 VISIT FOR SCREENING MAMMOGRAM: ICD-10-CM

## 2020-11-10 PROCEDURE — 77067 SCR MAMMO BI INCL CAD: CPT

## 2020-11-10 NOTE — RESULT ENCOUNTER NOTE
Dear lBanquita,    Here is a summary of your recent test results:    -Mammogram was normal.  ADVISE: rechecking in 1 year.    In addition, here is a list of due or overdue Health Maintenance reminders:    Zoster (Shingles) Vaccine(2 of 3) due on 11/24/2012    Please call us at [phone] (or use [employer]) to address the above recommendations or have any questions.    Thank you for choosing [facility].  It was an honor and a privilege to participate in your care today.     Healthy regards,    CANELO Ahn

## 2020-11-17 ENCOUNTER — VIRTUAL VISIT (OUTPATIENT)
Dept: FAMILY MEDICINE | Facility: OTHER | Age: 70
End: 2020-11-17

## 2020-11-17 NOTE — PROGRESS NOTES
"Date: 2020 15:18:09  Clinician: Chaya Pedersen  Clinician NPI: [id number]  Patient: nigel clements  Patient : 1950  Patient Address: [address] [place] [address]  Patient Phone: [phone]  Visit Protocol: URI  Patient Summary:  nigel is a 70 year old ( : 1950 ) female who initiated a [facility] Visit for COVID-19 (Coronavirus) evaluation and screening. When asked the question \"Please sign me up to receive news, health information and promotions. \", nigel responded \"No\".    nigel states her symptoms started gradually 3-4 days ago.   Her symptoms consist of rhinitis, myalgia, chills, malaise, a headache, a cough, and anosmia.   Symptom details     Nasal secretions: The color of her mucus is clear.    Cough: nigel coughs a few times an hour and her cough is not more bothersome at night. Phlegm comes into her throat when she coughs. She believes her cough is caused by post-nasal drip. The color of the phlegm is clear.     Headache: She states the headache is mild (1-3 on a 10 point pain scale).      nigel denies having vomiting, facial pain or pressure, sore throat, teeth pain, ageusia, diarrhea, ear pain, wheezing, fever, nasal congestion, and nausea. She also denies taking antibiotic medication in the past month, having recent facial or sinus surgery in the past 60 days, and double sickening (worsening symptoms after initial improvement). She is not experiencing dyspnea.   Precipitating events  She has recently been exposed to someone with influenza. nigel has been in close contact with the following high risk individuals: immunocompromised people, adults 65 or older, and children under the age of 5.   Pertinent COVID-19 (Coronavirus) information  nigel does not work or volunteer as healthcare worker or a . In the past 14 days, nigel has not worked or volunteered at a healthcare facility or group living setting.   In the past 14 days, she also has not lived in a " congregate living setting.   nigel has had a close contact with a laboratory-confirmed COVID-19 patient within 14 days of symptom onset. She was not exposed at her work. She does not know when she was exposed to the laboratory-confirmed COVID-19 patient.   Additional information about contact with COVID-19 (Coronavirus) patient as reported by the patient (free text): My son has Covid and both my  and I are exposed to him.    Since December 2019, nigel has not been tested for COVID-19 and has had upper respiratory infection (URI) or influenza-like illness.      Date(s) of previous URI or influenza-like illness (free-text): Nov. 13 - today(Nov. 19)     Symptoms nigel experienced during previous URI or influenza-like illness as reported by the patient (free-text): chills, runny nose cough, some dizziness        Pertinent medical history  nigel typically gets a yeast infection when she takes antibiotics. She is not sure if she has used fluconazole (Diflucan) to treat previous yeast infections.   nigel does not need a return to work/school note.   Weight: 189 lbs   nigel does not smoke or use smokeless tobacco.   Weight: 189 lbs    MEDICATIONS: No current medications, ALLERGIES: NKDA  Clinician Response:  Dear nigel,   Your symptoms show that you may have coronavirus (COVID-19). This illness can cause fever, cough and trouble breathing. Many people get a mild case and get better on their own. Some people can get very sick.  What should I do?  We would like to test you for this virus.   1. Please call [phone] to schedule your visit. Explain that you were referred by [facility] to have a COVID-19 test. Be ready to share your [facility] visit ID number.  * If you need to schedule in [facility] please call [phone] or for [facility] employees please call [phone].  * If you need to schedule in the [employer] area please call [phone]. [employer] employees call [phone].  The following will serve as your written  "order for this COVID Test, ordered by me, for the indication of suspected COVID [Z20.828]: The test will be ordered in [employer], our electronic health record, after you are scheduled. It will show as ordered and authorized by Donnie Skinner MD.  Order: COVID-19 (Coronavirus) PCR for [facility]TOMATIC [facility].   2. When it's time for your COVID test:  Stay at least 6 feet away from others. (If someone will drive you to your test, stay in the backseat, as far away from the  as you can.)   Cover your mouth and nose with a mask, tissue or washcloth.  Go straight to the testing site. Don't make any stops on the way there or back.      3.Starting now: Stay home and away from others (self-isolate) until:   You've had no fever---and no medicine that reduces fever---for one full day (24 hours). And...   Your other symptoms have gotten better. For example, your cough or breathing has improved. And...   At least 10 days have passed since your symptoms started.       During this time, don't leave the house except for testing or medical care.   Stay in your own room, even for meals. Use your own bathroom if you can.   Stay away from others in your home. No hugging, kissing or shaking hands. No visitors.  Don't go to work, school or anywhere else.    Clean \"high touch\" surfaces often (doorknobs, counters, handles, etc.). Use a household cleaning spray or wipes. You'll find a full list of  on the EPA website: www.epa.gov/pesticide-registration/list-n-disinfectants-use-against-sars-cov-2.   Cover your mouth and nose with a mask, tissue or washcloth to avoid spreading germs.  Wash your hands and face often. Use soap and water.  Caregivers in these groups are at risk for severe illness due to COVID-19:  o People 65 years and older  o People who live in a nursing home or long-term care facility  o People with chronic disease (lung, heart, cancer, diabetes, kidney, liver, immunologic)  o People who have a weakened immune " system, including those who:   Are in cancer treatment  Take medicine that weakens the immune system, such as corticosteroids  Had a bone marrow or organ transplant  Have an immune deficiency  Have poorly controlled HIV or AIDS  Are obese (body mass index of 40 or higher)  Smoke regularly   o Caregivers should wear gloves while washing dishes, handling laundry and cleaning bedrooms and bathrooms.  o Use caution when washing and drying laundry: Don't shake dirty laundry, and use the warmest water setting that you can.  o For more tips, go to www.cdc.gov/coronavirus/2019-ncov/downloads/10Things.pdf.    4.Sign up for [employer]. We know it's scary to hear that you might have COVID-19. We want to track your symptoms to make sure you're okay over the next 2 weeks. Please look for an email from [employer]---this is a free, online program that we'll use to keep in touch. To sign up, follow the link in the email. Learn more at [email or web address]  How can I take care of myself?   Get lots of rest. Drink extra fluids (unless a doctor has told you not to).   Take Tylenol (acetaminophen) for fever or pain. If you have liver or kidney problems, ask your family doctor if it's okay to take Tylenol.   Adults can take either:    650 mg (two 325 mg pills) every 4 to 6 hours, or...   1,000 mg (two 500 mg pills) every 8 hours as needed.    Note: Don't take more than 3,000 mg in one day. Acetaminophen is found in many medicines (both prescribed and over-the-counter medicines). Read all labels to be sure you don't take too much.   For children, check the Tylenol bottle for the right dose. The dose is based on the child's age or weight.    If you have other health problems (like cancer, heart failure, an organ transplant or severe kidney disease): Call your specialty clinic if you don't feel better in the next 2 days.       Know when to call 911. Emergency warning signs include:    Trouble breathing or shortness of  breath Pain or pressure in the chest that doesn't go away Feeling confused like you haven't felt before, or not being able to wake up Bluish-colored lips or face.  Where can I get more information?   [facility] -- About COVID-19: [email or web address]   CDC -- What to Do If You're Sick: www.cdc.gov/coronavirus/2019-ncov/about/steps-when-sick.html   CDC -- Ending Home Isolation: www.cdc.gov/coronavirus/2019-ncov/hcp/disposition-in-home-patients.html   CDC -- Caring for Someone: www.cdc.gov/coronavirus/2019-ncov/if-you-are-sick/care-for-someone.html   [facility] -- Interim Guidance for Hospital Discharge to Home: www.[facility].mn.us/diseases/coronavirus/hcp/hospdischarge.pdf   [facility] clinical trials (COVID-19 research studies): clinicalaffairs.[facility]-clinical-trials    Below are the COVID-19 hotlines at the [facility] ([facility]). Interpreters are available.    For health questions: Call 312-373-2928 or 1-830.632.2909 (7 a.m. to 7 p.m.) For questions about schools and childcare: Call 957-190-6401 or 1-303.125.6052 (7 a.m. to 7 p.m.)    Diagnosis: Contact with and (suspected) exposure to other viral communicable diseases  Diagnosis ICD: Z20.828

## 2020-11-19 DIAGNOSIS — Z20.822 SUSPECTED 2019 NOVEL CORONAVIRUS INFECTION: Primary | ICD-10-CM

## 2020-11-20 DIAGNOSIS — Z20.822 SUSPECTED 2019 NOVEL CORONAVIRUS INFECTION: ICD-10-CM

## 2020-11-20 PROCEDURE — U0003 INFECTIOUS AGENT DETECTION BY NUCLEIC ACID (DNA OR RNA); SEVERE ACUTE RESPIRATORY SYNDROME CORONAVIRUS 2 (SARS-COV-2) (CORONAVIRUS DISEASE [COVID-19]), AMPLIFIED PROBE TECHNIQUE, MAKING USE OF HIGH THROUGHPUT TECHNOLOGIES AS DESCRIBED BY CMS-2020-01-R: HCPCS | Performed by: FAMILY MEDICINE

## 2020-11-21 LAB
SARS-COV-2 RNA SPEC QL NAA+PROBE: ABNORMAL
SPECIMEN SOURCE: ABNORMAL

## 2021-01-15 ENCOUNTER — TRANSFERRED RECORDS (OUTPATIENT)
Dept: HEALTH INFORMATION MANAGEMENT | Facility: CLINIC | Age: 71
End: 2021-01-15

## 2021-02-19 ENCOUNTER — OFFICE VISIT (OUTPATIENT)
Dept: SURGERY | Facility: CLINIC | Age: 71
End: 2021-02-19
Payer: COMMERCIAL

## 2021-02-19 VITALS
HEART RATE: 71 BPM | DIASTOLIC BLOOD PRESSURE: 81 MMHG | RESPIRATION RATE: 16 BRPM | SYSTOLIC BLOOD PRESSURE: 145 MMHG | WEIGHT: 209 LBS | OXYGEN SATURATION: 99 % | BODY MASS INDEX: 35.68 KG/M2 | HEIGHT: 64 IN

## 2021-02-19 DIAGNOSIS — K43.2 INCISIONAL HERNIA, WITHOUT OBSTRUCTION OR GANGRENE: Primary | ICD-10-CM

## 2021-02-19 PROCEDURE — 99214 OFFICE O/P EST MOD 30 MIN: CPT | Performed by: SURGERY

## 2021-02-19 ASSESSMENT — MIFFLIN-ST. JEOR: SCORE: 1443.02

## 2021-02-19 NOTE — PROGRESS NOTES
This is a 70-year-old patient known to me from a previous visit for her complex incisional hernia.  I saw her about a year ago, and at that time suggested that if she wanted to pursue repair of her hernia, that her robotic assisted intraperitoneal onlay repair was probably most appropriate.  She does feel that hernia is slightly more prominent now.  She saw the surgeon at the [facility], who felt the patient should probably undergo repair after losing some weight.  That surgeon was in agreement with the plan for an intraperitoneal onlay mesh.    Past medical and surgical histories are reviewed.  Notably, the patient has undergone a robotic assisted pelvic surgery for cancer and a trauma splenectomy.    Physical exam:  The patient is in no apparent distress.  Breathing is nonlabored.  The abdomen reveals a prominent partially reducible bulge in the upper abdomen.  This is nontender.    CT scan done last year revealed colon within her incisional hernia.  There was no evidence of obstruction.    Assessment and plan: Overall, there has been relatively little change.  I think the patient should probably have her hernia fixed at some point, as there is some risk for incarceration or strangulation.  The patient is interested in trying to lose some weight, and I do think that is reasonable.  I have asked her to come back and see me once she feels she has plateaued in her weight loss or wishes to proceed with surgery.    A total of 35 minutes was spent in chart review, patient examined discussion and documentation today.    Kolby Cole MD  [facility]    Please route or send letter to:  Referring Provider

## 2021-02-19 NOTE — LETTER
2021    RE: Blanquita Vega, : 1950      This is a 70-year-old patient known to me from a previous visit for her complex incisional hernia.  I saw her about a year ago, and at that time suggested that if she wanted to pursue repair of her hernia, that her robotic assisted intraperitoneal onlay repair was probably most appropriate.  She does feel that hernia is slightly more prominent now.  She saw the surgeon at the [facility], who felt the patient should probably undergo repair after losing some weight.  That surgeon was in agreement with the plan for an intraperitoneal onlay mesh.     Past medical and surgical histories are reviewed.  Notably, the patient has undergone a robotic assisted pelvic surgery for cancer and a trauma splenectomy.     Physical exam:  The patient is in no apparent distress.  Breathing is nonlabored.  The abdomen reveals a prominent partially reducible bulge in the upper abdomen.  This is nontender.     CT scan done last year revealed colon within her incisional hernia.  There was no evidence of obstruction.     Assessment and plan: Overall, there has been relatively little change.  I think the patient should probably have her hernia fixed at some point, as there is some risk for incarceration or strangulation.  The patient is interested in trying to lose some weight, and I do think that is reasonable.  I have asked her to come back and see me once she feels she has plateaued in her weight loss or wishes to proceed with surgery.          Kolby Cole MD  [facility]

## 2021-04-21 NOTE — PROGRESS NOTES
"  SUBJECTIVE:   Blanquita Trevizo is a 67 year old female who presents to clinic today for the following health issues:      Acute Illness   Acute illness concerns: Cold symptoms  Onset: x 1 week    Fever: no     Chills/Sweats: no     Headache (location?): YES    Sinus Pressure:YES    Conjunctivitis:  YES: left    Ear Pain: no pain but ears are plugged    Rhinorrhea: not today but yes during last week     Congestion: YES    Sore Throat: YES     Cough: YES    Wheeze: no     Decreased Appetite: somewhat    Nausea: no     Vomiting: no     Diarrhea:  no     Dysuria/Freq.: no     Fatigue/Achiness: YES    Sick/Strep Exposure: YES- grandson's had ear infection and pink eye     Therapies Tried and outcome: Nasal spray, Nyquil, Claritin, Excedrin,vicks        Problem list and histories reviewed & adjusted, as indicated.  Additional history: as documented      ROS:  Constitutional, HEENT, cardiovascular, pulmonary, gi and gu systems are negative, except as otherwise noted.      OBJECTIVE:   /72 (BP Location: Right arm, Patient Position: Sitting, Cuff Size: Adult Regular)  Pulse 81  Temp 97.5  F (36.4  C) (Oral)  Ht 5' 4\" (1.626 m)  Wt 196 lb (88.9 kg)  SpO2 97%  BMI 33.64 kg/m2  Body mass index is 33.64 kg/(m^2).  GEN: Well developed, well nourished in NAD.  HEENT: Normocephalic. Eyes: + lash matting bilat and  conjunctival flushing noted. EARS: TMs WNL, Canals clear.  Nose: Edematous mucosa without lesion. Clear rhinorrhea. Mouth/Pharynx: sl cobblestoning and telangiectasia.   NECK: Supple with sl anterior cervical lymphadenopathy.  No Thyromegaly  SKIN: No Exanthem noted.      ASSESSMENT/PLAN:   1. Bacterial conjunctivitis of both eyes  - erythromycin (ROMYCIN) ophthalmic ointment; Place 1 Application into both eyes At Bedtime  Dispense: 1 Tube; Refill: 0    2. Viral upper respiratory infection  - Strep, Rapid Screen  - Beta strep group A culture    Use medication as directed.  Follow up if symptoms should " persist, change or worsen.  Patient amenable to this follow up plan.     Carmela Vance PA-C  [facility]   [employer] Pregnancy And Lactation Text: This medication is Pregnancy Category B and is considered safe. There is some data to suggest avoiding during the third trimester. It is unknown if this medication is excreted in breast milk.

## 2021-05-24 NOTE — PROGRESS NOTES
"    Assessment & Plan     Incisional hernia, without obstruction or gangrene  Obesity, Class I, BMI 30.0-34.9 (see actual BMI)  Answered all questions regarding hernia to her satisfaction.  Did defer healing questions to Dr. Cole.  I do agree with working on weight loss prior to surgical procedure and advised her that this is not urgent at this time.  Recommended weight watchers or similar and continued exercise program.  Given warning signs of incarceration and when to seek emergent care.    Elevated BP without diagnosis of hypertension  Normotensive upon recheck today.  Recommended occasional checking blood pressure at home and if systolic greater than 140 to contact the clinic    Tubular adenoma of colon 2016 - repeat 2021  Screen for colon cancer  Routine screening.  Ordered at [facility] per patient request.  - GASTROENTEROLOGY ADULT REF PROCEDURE ONLY         BMI:   Estimated body mass index is 34.84 kg/m  as calculated from the following:    Height as of this encounter: 1.626 m (5' 4\").    Weight as of this encounter: 92.1 kg (203 lb).   Weight management plan: Discussed healthy diet and exercise guidelines Specific weight management program called Weight watchers discussed        Return in about 4 months (around 9/11/2021) for Physical Exam, Medication recheck, Fasting labs.    Nasra Herron PA-C  [facility]   Blanquita is a 70 year old who presents for the following health issues     History of Present Illness     Hypertension comment:  Patient was found to have elevated blood pressure at her last visit, however, she reports that they had use the automated machine and she has never had a blood pressure checked with this before.  Blood pressure was pulled over from visit in the fall 2020 at surgical consultation.  Did purchase a blood pressure cuff but has not used it yet.  Kidney function is normal.    She eats 2-3 servings of fruits and vegetables daily.She " "consumes 0 sweetened beverage(s) daily.She exercises with enough effort to increase her heart rate 30 to 60 minutes per day.  She exercises with enough effort to increase her heart rate 4 days per week.   She is taking medications regularly.     Incisional hernia   Was seen 02/19/2021 at [facility] by Dr. Cole who recommended a robotic assisted intraperitoneal onlay repair.  She saw a surgeon at [facility] who felt she should undergo repair after losing some weight.  That surgeon was in agreement with the plan for intraperitoneal onlay mesh.  She is wondering if this procedure is urgent.  She states that Dr. Cole initially did not know what surgical approach he would perform, however, the plan established in the note was agreed upon by the [place] second opinion.  She states that Dr. Cole was not adamant about weight loss prior to the procedure and she has been exercising 4 days weekly but feels that her diet could improve.  She has never done a dietary program like weight watchers in the past and states that she feels that she needs to \"eat more clean \".      Answers for HPI/ROS submitted by the patient on 5/24/2021   Chronic problems general questions HPI Form  Dietary sodium intake:: Yes          Review of Systems   Constitutional, HEENT, cardiovascular, pulmonary, GI, , musculoskeletal, neuro, skin, endocrine and psych systems are negative, except as otherwise noted.      Objective    There were no vitals taken for this visit.  There is no height or weight on file to calculate BMI.  Physical Exam   GENERAL: healthy, alert and no distress  EYES: Eyes grossly normal to inspection  RESP: lungs clear to auscultation - no rales, rhonchi or wheezes  CV: regular rate and rhythm, normal S1 S2, no S3 or S4, no murmur, click or rub, no peripheral edema and peripheral pulses strong  ABDOMEN: soft, nontender, no hepatosplenomegaly, large midline incisional hernia that is partially reducible and " nontender and bowel sounds normal  MS: no gross musculoskeletal defects noted, no edema  SKIN: no suspicious lesions or rashes  NEURO: Normal strength and tone, mentation intact and speech normal  PSYCH: mentation appears normal, affect normal/bright

## 2021-05-25 ENCOUNTER — OFFICE VISIT (OUTPATIENT)
Dept: FAMILY MEDICINE | Facility: CLINIC | Age: 71
End: 2021-05-25
Payer: COMMERCIAL

## 2021-05-25 VITALS
WEIGHT: 203 LBS | OXYGEN SATURATION: 97 % | SYSTOLIC BLOOD PRESSURE: 122 MMHG | HEIGHT: 64 IN | DIASTOLIC BLOOD PRESSURE: 74 MMHG | HEART RATE: 76 BPM | BODY MASS INDEX: 34.66 KG/M2 | TEMPERATURE: 97.6 F

## 2021-05-25 DIAGNOSIS — Z12.11 SCREEN FOR COLON CANCER: ICD-10-CM

## 2021-05-25 DIAGNOSIS — R03.0 ELEVATED BP WITHOUT DIAGNOSIS OF HYPERTENSION: ICD-10-CM

## 2021-05-25 DIAGNOSIS — C54.1 ENDOMETRIAL CANCER (H): ICD-10-CM

## 2021-05-25 DIAGNOSIS — D12.6 TUBULAR ADENOMA OF COLON: ICD-10-CM

## 2021-05-25 DIAGNOSIS — K43.2 INCISIONAL HERNIA, WITHOUT OBSTRUCTION OR GANGRENE: Primary | ICD-10-CM

## 2021-05-25 DIAGNOSIS — E66.811 OBESITY, CLASS I, BMI 30.0-34.9 (SEE ACTUAL BMI): ICD-10-CM

## 2021-05-25 PROBLEM — E66.01 MORBID OBESITY (H): Status: RESOLVED | Noted: 2019-08-01 | Resolved: 2021-05-25

## 2021-05-25 PROCEDURE — 99214 OFFICE O/P EST MOD 30 MIN: CPT | Performed by: PHYSICIAN ASSISTANT

## 2021-05-25 ASSESSMENT — MIFFLIN-ST. JEOR: SCORE: 1415.8

## 2021-08-02 ENCOUNTER — E-VISIT (OUTPATIENT)
Dept: URGENT CARE | Facility: URGENT CARE | Age: 71
End: 2021-08-02
Payer: COMMERCIAL

## 2021-08-02 DIAGNOSIS — J01.90 ACUTE SINUSITIS WITH SYMPTOMS > 10 DAYS: Primary | ICD-10-CM

## 2021-08-02 PROCEDURE — 99421 OL DIG E/M SVC 5-10 MIN: CPT | Performed by: NURSE PRACTITIONER

## 2021-08-02 NOTE — PATIENT INSTRUCTIONS
Dear Blanquita Trevizo    After reviewing your responses, I've been able to diagnose you with?a sinus infection caused by bacteria.?     Based on your responses and diagnosis, I have prescribed Augmentin to treat your symptoms. I have sent this to your pharmacy.?     It is also important to stay well hydrated, get lots of rest and take over-the-counter decongestants,?tylenol?or ibuprofen if you?are able to?take those medications per your primary care provider to help relieve discomfort.?     It is important that you take?all of?your prescribed medication even if your symptoms are improving after a few doses.? Taking?all of?your medicine helps prevent the symptoms from returning.?     If your symptoms worsen, you develop severe headache, vomiting, high fever (>102), or are not improving in 7 days, please contact your primary care provider for an appointment or visit any of our convenient Walk-in Care or Urgent Care Centers to be seen which can be found on our website?here.?     Thanks again for choosing?us?as your health care partner,?   ?  TAD Garner CNP?     Sinusitis (Antibiotic Treatment)    The sinuses are air-filled spaces within the bones of the face. They connect to the inside of the nose. Sinusitis is an inflammation of the tissue that lines the sinuses. Sinusitis can occur during a cold. It can also happen due to allergies to pollens and other particles in the air. Sinusitis can cause symptoms of sinus congestion and a feeling of fullness. A sinus infection causes fever, headache, and facial pain. There is often green or yellow fluid draining from the nose or into the back of the throat (post-nasal drip). You have been given antibiotics to treat this condition.   Home care    Take the full course of antibiotics as instructed. Don't stop taking them, even when you feel better.    Drink plenty of water, hot tea, and other liquids as directed by the healthcare provider. This may help thin nasal  mucus. It also may help your sinuses drain fluids.    Heat may help soothe painful areas of your face. Use a towel soaked in hot water. Or,  the shower and direct the warm spray onto your face. Using a vaporizer along with a menthol rub at night may also help soothe symptoms.     An expectorant with guaifenesin may help thin nasal mucus and help your sinuses drain fluids. Talk with your provider or pharmacists before taking an over-the-counter (OTC) medicine if you have any questions about it or its side effects..    You can use an OTC decongestant, unless a similar medicine was prescribed to you. Nasal sprays work the fastest. Use one that contains phenylephrine or oxymetazoline. First blow your nose gently. Then use the spray. Don't use these medicines more often than directed on the label. If you do, your symptoms may get worse. You may also take pills that contain pseudoephedrine. Don t use products that combine multiple medicines. This is because side effects may be increased. Read labels. You can also ask the pharmacist for help. (People with high blood pressure should not use decongestants. They can raise blood pressure.) Talk with your provider or pharmacist if you have any questions about the medicine..    OTC antihistamines may help if allergies contributed to your sinusitis. Talk with your provider or pharmacist if you have any questions about the medicine..    Don't use nasal rinses or irrigation during an acute sinus infection, unless your healthcare provider tells you to. Rinsing may spread the infection to other areas in your sinuses.    Use acetaminophen or ibuprofen to control pain, unless another pain medicine was prescribed to you. If you have chronic liver or kidney disease or ever had a stomach ulcer, talk with your healthcare provider before using these medicines. Never give aspirin to anyone under age 18 who is ill with a fever. It may cause severe liver damage.    Don't smoke. This  can make symptoms worse.    Follow-up care  Follow up with your healthcare provider, or as advised.   When to seek medical advice  Call your healthcare provider if any of these occur:     Facial pain or headache that gets worse    Stiff neck    Unusual drowsiness or confusion    Swelling of your forehead or eyelids    Symptoms don't go away in 10 days    Vision problems, such as blurred or double vision    Fever of 100.4 F (38 C) or higher, or as directed by your healthcare provider  Call 911  Call 911 if any of these occur:     Seizure    Trouble breathing    Feeling dizzy or faint    Fingernails, skin or lips look blue, purple , or gray  Prevention  Here are steps you can take to help prevent an infection:     Keep good hand washing habits.    Don t have close contact with people who have sore throats, colds, or other upper respiratory infections.    Don t smoke, and stay away from secondhand smoke.    Stay up to date with of your vaccines.  [employer] last reviewed this educational content on 12/1/2019 2000-2021 [employer]. All rights reserved. This information is not intended as a substitute for professional medical care. Always follow your healthcare professional's instructions.

## 2021-08-31 ENCOUNTER — E-VISIT (OUTPATIENT)
Dept: FAMILY MEDICINE | Facility: CLINIC | Age: 71
End: 2021-08-31
Payer: COMMERCIAL

## 2021-08-31 DIAGNOSIS — N39.0 ACUTE UTI (URINARY TRACT INFECTION): Primary | ICD-10-CM

## 2021-08-31 PROCEDURE — 99421 OL DIG E/M SVC 5-10 MIN: CPT | Performed by: PHYSICIAN ASSISTANT

## 2021-08-31 RX ORDER — SULFAMETHOXAZOLE/TRIMETHOPRIM 800-160 MG
1 TABLET ORAL 2 TIMES DAILY
Qty: 10 TABLET | Refills: 0 | Status: SHIPPED | OUTPATIENT
Start: 2021-08-31 | End: 2021-09-05

## 2021-08-31 NOTE — PATIENT INSTRUCTIONS
Dear Blanquita Trevizo    After reviewing your responses, I've been able to diagnose you with a urinary tract infection, which is a common infection of the bladder with bacteria.  This is not a sexually transmitted infection, though urinating immediately after intercourse can help prevent infections.  Drinking lots of fluids is also helpful to clear your current infection and prevent the next one.      I have sent a prescription for antibiotics to your pharmacy to treat this infection.    It is important that you take all of your prescribed medication even if your symptoms are improving after a few doses.  Taking all of your medicine helps prevent the symptoms from returning.     If your symptoms worsen, you develop pain in your back or stomach, develop fevers, or are not improving in 5 days, please contact your primary care provider for an appointment or visit any of our convenient Walk-in or Urgent Care Centers to be seen, which can be found on our website here.    Thanks again for choosing us as your health care partner,    Nasra Herron PA-C    Urinary Tract Infections in Women  Urinary tract infections (UTIs) are most often caused by bacteria. These bacteria enter the urinary tract. The bacteria may come from inside the body. Or they may travel from the skin outside the rectum or vagina into the urethra. Female anatomy makes it easy for bacteria from the bowel to enter a woman s urinary tract, which is the most common source of UTI. This means women develop UTIs more often than men. Pain in or around the urinary tract is a common UTI symptom. But the only way to know for sure if you have a UTI for the healthcare provider to test your urine. The two tests that may be done are the urinalysis and urine culture.     Types of UTIs    Cystitis. A bladder infection (cystitis) is the most common UTI in women. You may have urgent or frequent need to pee. You may also have pain, burning when you pee, and bloody  urine.    Urethritis. This is an inflamed urethra, which is the tube that carries urine from the bladder to outside the body. You may have lower stomach or back pain. You may also have urgent or frequent need to pee.    Pyelonephritis. This is a kidney infection. If not treated, it can be serious and damage your kidneys. In severe cases, you may need to stay in the hospital. You may have a fever and lower back pain.    Medicines to treat a UTI  Most UTIs are treated with antibiotics. These kill the bacteria. The length of time you need to take them depends on the type of infection. It may be as short as 3 days. If you have repeated UTIs, you may need a low-dose antibiotic for several months. Take antibiotics exactly as directed. Don t stop taking them until all of the medicine is gone. If you stop taking the antibiotic too soon, the infection may not go away. You may also develop a resistance to the antibiotic. This can make it much harder to treat.   Lifestyle changes to treat and prevent UTIs   The lifestyle changes below will help get rid of your UTI. They may also help prevent future UTIs.     Drink plenty of fluids. This includes water, juice, or other caffeine-free drinks. Fluids help flush bacteria out of your body.    Empty your bladder. Always empty your bladder when you feel the urge to pee. And always pee before going to sleep. Urine that stays in your bladder can lead to infection. Try to pee before and after sex as well.    Practice good personal hygiene. Wipe yourself from front to back after using the toilet. This helps keep bacteria from getting into the urethra.    Use condoms during sex. These help prevent UTIs caused by sexually transmitted bacteria. Also don't use spermicides during sex. These can increase the risk for UTIs. Choose other forms of birth control instead. For women who tend to get UTIs after sex, a low-dose of a preventive antibiotic may be used. Be sure to discuss this option with  your healthcare provider.    Follow up with your healthcare provider as directed. He or she may test to make sure the infection has cleared. If needed, more treatment may be started.  [employer] last reviewed this educational content on 7/1/2019 2000-2021 [employer]. All rights reserved. This information is not intended as a substitute for professional medical care. Always follow your healthcare professional's instructions.

## 2021-09-04 ENCOUNTER — HEALTH MAINTENANCE LETTER (OUTPATIENT)
Age: 71
End: 2021-09-04

## 2021-09-13 ASSESSMENT — ENCOUNTER SYMPTOMS
SORE THROAT: 0
ABDOMINAL PAIN: 0
SHORTNESS OF BREATH: 0
NAUSEA: 0
ARTHRALGIAS: 0
COUGH: 0
FREQUENCY: 0
DYSURIA: 0
HEADACHES: 0
PALPITATIONS: 0
CONSTIPATION: 0
HEARTBURN: 0
DIARRHEA: 0
WEAKNESS: 0
DIZZINESS: 0
FEVER: 0
HEMATOCHEZIA: 0
PARESTHESIAS: 0
MYALGIAS: 0
EYE PAIN: 0
JOINT SWELLING: 0
HEMATURIA: 0
BREAST MASS: 0
CHILLS: 0
NERVOUS/ANXIOUS: 0

## 2021-09-13 ASSESSMENT — ACTIVITIES OF DAILY LIVING (ADL): CURRENT_FUNCTION: NO ASSISTANCE NEEDED

## 2021-09-14 ENCOUNTER — OFFICE VISIT (OUTPATIENT)
Dept: FAMILY MEDICINE | Facility: CLINIC | Age: 71
End: 2021-09-14
Payer: COMMERCIAL

## 2021-09-14 ENCOUNTER — PATIENT OUTREACH (OUTPATIENT)
Dept: CARE COORDINATION | Facility: CLINIC | Age: 71
End: 2021-09-14

## 2021-09-14 ENCOUNTER — PATIENT OUTREACH (OUTPATIENT)
Dept: NURSING | Facility: CLINIC | Age: 71
End: 2021-09-14
Payer: COMMERCIAL

## 2021-09-14 VITALS
BODY MASS INDEX: 33.12 KG/M2 | TEMPERATURE: 97.5 F | HEIGHT: 64 IN | HEART RATE: 72 BPM | OXYGEN SATURATION: 98 % | SYSTOLIC BLOOD PRESSURE: 114 MMHG | DIASTOLIC BLOOD PRESSURE: 64 MMHG | WEIGHT: 194 LBS

## 2021-09-14 DIAGNOSIS — E66.811 OBESITY, CLASS I, BMI 30.0-34.9 (SEE ACTUAL BMI): ICD-10-CM

## 2021-09-14 DIAGNOSIS — E78.5 HYPERLIPIDEMIA LDL GOAL <130: ICD-10-CM

## 2021-09-14 DIAGNOSIS — Z12.11 SCREEN FOR COLON CANCER: ICD-10-CM

## 2021-09-14 DIAGNOSIS — D12.6 TUBULAR ADENOMA OF COLON: ICD-10-CM

## 2021-09-14 DIAGNOSIS — I35.8 AORTIC VALVE SCLEROSIS: ICD-10-CM

## 2021-09-14 DIAGNOSIS — Z23 HIGH PRIORITY FOR COVID-19 VIRUS VACCINATION: ICD-10-CM

## 2021-09-14 DIAGNOSIS — Z13.1 SCREENING FOR DIABETES MELLITUS: ICD-10-CM

## 2021-09-14 DIAGNOSIS — K43.2 INCISIONAL HERNIA, WITHOUT OBSTRUCTION OR GANGRENE: ICD-10-CM

## 2021-09-14 DIAGNOSIS — Z90.81 S/P SPLENECTOMY: ICD-10-CM

## 2021-09-14 DIAGNOSIS — R01.1 HEART MURMUR: ICD-10-CM

## 2021-09-14 DIAGNOSIS — Z00.00 ENCOUNTER FOR MEDICARE ANNUAL WELLNESS EXAM: Primary | ICD-10-CM

## 2021-09-14 DIAGNOSIS — I34.0 MITRAL VALVE INSUFFICIENCY, UNSPECIFIED ETIOLOGY: ICD-10-CM

## 2021-09-14 DIAGNOSIS — R30.0 DYSURIA: ICD-10-CM

## 2021-09-14 DIAGNOSIS — Z13.0 SCREENING FOR DEFICIENCY ANEMIA: ICD-10-CM

## 2021-09-14 DIAGNOSIS — H90.8 MIXED CONDUCTIVE AND SENSORINEURAL HEARING LOSS, UNSPECIFIED LATERALITY: ICD-10-CM

## 2021-09-14 DIAGNOSIS — M85.80 OSTEOPENIA, UNSPECIFIED LOCATION: ICD-10-CM

## 2021-09-14 DIAGNOSIS — C54.1 ENDOMETRIAL CANCER (H): ICD-10-CM

## 2021-09-14 DIAGNOSIS — Z12.31 VISIT FOR SCREENING MAMMOGRAM: ICD-10-CM

## 2021-09-14 LAB
ALBUMIN SERPL-MCNC: 3.5 G/DL (ref 3.4–5)
ALBUMIN UR-MCNC: NEGATIVE MG/DL
ALP SERPL-CCNC: 46 U/L (ref 40–150)
ALT SERPL W P-5'-P-CCNC: 21 U/L (ref 0–50)
ANION GAP SERPL CALCULATED.3IONS-SCNC: 4 MMOL/L (ref 3–14)
APPEARANCE UR: CLEAR
AST SERPL W P-5'-P-CCNC: 18 U/L (ref 0–45)
BILIRUB SERPL-MCNC: 0.5 MG/DL (ref 0.2–1.3)
BILIRUB UR QL STRIP: NEGATIVE
BUN SERPL-MCNC: 19 MG/DL (ref 7–30)
CALCIUM SERPL-MCNC: 8.2 MG/DL (ref 8.5–10.1)
CHLORIDE BLD-SCNC: 109 MMOL/L (ref 94–109)
CHOLEST SERPL-MCNC: 217 MG/DL
CO2 SERPL-SCNC: 27 MMOL/L (ref 20–32)
COLOR UR AUTO: YELLOW
CREAT SERPL-MCNC: 0.62 MG/DL (ref 0.52–1.04)
DEPRECATED CALCIDIOL+CALCIFEROL SERPL-MC: 34 UG/L (ref 20–75)
ERYTHROCYTE [DISTWIDTH] IN BLOOD BY AUTOMATED COUNT: 14.3 % (ref 10–15)
FASTING STATUS PATIENT QL REPORTED: YES
GFR SERPL CREATININE-BSD FRML MDRD: 90 ML/MIN/1.73M2
GLUCOSE BLD-MCNC: 104 MG/DL (ref 70–99)
GLUCOSE UR STRIP-MCNC: NEGATIVE MG/DL
HCT VFR BLD AUTO: 40.7 % (ref 35–47)
HDLC SERPL-MCNC: 57 MG/DL
HGB BLD-MCNC: 12.9 G/DL (ref 11.7–15.7)
HGB UR QL STRIP: NEGATIVE
KETONES UR STRIP-MCNC: NEGATIVE MG/DL
LDLC SERPL CALC-MCNC: 143 MG/DL
LEUKOCYTE ESTERASE UR QL STRIP: NEGATIVE
MCH RBC QN AUTO: 29.9 PG (ref 26.5–33)
MCHC RBC AUTO-ENTMCNC: 31.7 G/DL (ref 31.5–36.5)
MCV RBC AUTO: 94 FL (ref 78–100)
NITRATE UR QL: NEGATIVE
NONHDLC SERPL-MCNC: 160 MG/DL
PH UR STRIP: 7 [PH] (ref 5–7)
PLATELET # BLD AUTO: 310 10E3/UL (ref 150–450)
POTASSIUM BLD-SCNC: 4.2 MMOL/L (ref 3.4–5.3)
PROT SERPL-MCNC: 7.4 G/DL (ref 6.8–8.8)
RBC # BLD AUTO: 4.32 10E6/UL (ref 3.8–5.2)
RBC #/AREA URNS AUTO: NORMAL /HPF
SODIUM SERPL-SCNC: 140 MMOL/L (ref 133–144)
SP GR UR STRIP: 1.02 (ref 1–1.03)
TRIGL SERPL-MCNC: 87 MG/DL
UROBILINOGEN UR STRIP-ACNC: 1 E.U./DL
WBC # BLD AUTO: 5.6 10E3/UL (ref 4–11)
WBC #/AREA URNS AUTO: NORMAL /HPF

## 2021-09-14 PROCEDURE — 91301 COVID-19,PF,MODERNA (18+ YRS): CPT | Performed by: PHYSICIAN ASSISTANT

## 2021-09-14 PROCEDURE — 99214 OFFICE O/P EST MOD 30 MIN: CPT | Mod: 25 | Performed by: PHYSICIAN ASSISTANT

## 2021-09-14 PROCEDURE — 80061 LIPID PANEL: CPT | Performed by: PHYSICIAN ASSISTANT

## 2021-09-14 PROCEDURE — 81001 URINALYSIS AUTO W/SCOPE: CPT | Performed by: PHYSICIAN ASSISTANT

## 2021-09-14 PROCEDURE — 80053 COMPREHEN METABOLIC PANEL: CPT | Performed by: PHYSICIAN ASSISTANT

## 2021-09-14 PROCEDURE — 99397 PER PM REEVAL EST PAT 65+ YR: CPT | Mod: 25 | Performed by: PHYSICIAN ASSISTANT

## 2021-09-14 PROCEDURE — 85027 COMPLETE CBC AUTOMATED: CPT | Performed by: PHYSICIAN ASSISTANT

## 2021-09-14 PROCEDURE — 36415 COLL VENOUS BLD VENIPUNCTURE: CPT | Performed by: PHYSICIAN ASSISTANT

## 2021-09-14 PROCEDURE — 87086 URINE CULTURE/COLONY COUNT: CPT | Performed by: PHYSICIAN ASSISTANT

## 2021-09-14 PROCEDURE — 82306 VITAMIN D 25 HYDROXY: CPT | Performed by: PHYSICIAN ASSISTANT

## 2021-09-14 PROCEDURE — 0013A COVID-19,PF,MODERNA (18+ YRS): CPT | Performed by: PHYSICIAN ASSISTANT

## 2021-09-14 ASSESSMENT — MIFFLIN-ST. JEOR: SCORE: 1374.98

## 2021-09-14 ASSESSMENT — ENCOUNTER SYMPTOMS
EYE PAIN: 0
SHORTNESS OF BREATH: 0
BREAST MASS: 0
DIARRHEA: 0
HEARTBURN: 0
PARESTHESIAS: 0
COUGH: 0
HEMATURIA: 0
FREQUENCY: 0
SORE THROAT: 0
NAUSEA: 0
JOINT SWELLING: 0
DYSURIA: 0
NERVOUS/ANXIOUS: 0
FEVER: 0
HEADACHES: 0
DIZZINESS: 0
PALPITATIONS: 0
ARTHRALGIAS: 0
ABDOMINAL PAIN: 0
CHILLS: 0
CONSTIPATION: 0
WEAKNESS: 0
MYALGIAS: 0
HEMATOCHEZIA: 0

## 2021-09-14 ASSESSMENT — ACTIVITIES OF DAILY LIVING (ADL): CURRENT_FUNCTION: NO ASSISTANCE NEEDED

## 2021-09-14 NOTE — PROGRESS NOTES
Clinic Care Coordination Contact  [facility]       Clinical Data: Care Coordinator Outreach  Outreach attempted x 1.  Briefly spoke to Blanquita, but was just running out the door. Requested call back later today.    Chart Review:  Referral from PCP. hearing loss, saw ENT - no great resources offered (see ENT note under media tab), wondering about options with insurance/cost savings for hearing aids    Plan:  Care Coordinator will try to reach patient again in 1-2 business days.    GABRIELLE Smith  Clinic Care Coordination  [facility] Clinics : [place], [place], and [place]  Phone: [phone]
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
## 2021-09-14 NOTE — PATIENT INSTRUCTIONS
Patient Education   Personalized Prevention Plan  You are due for the preventive services outlined below.  Your care team is available to assist you in scheduling these services.  If you have already completed any of these items, please share that information with your care team to update in your medical record.  Health Maintenance Due   Topic Date Due     ANNUAL REVIEW OF HM ORDERS  Never done     Zoster (Shingles) Vaccine (2 of 3) 11/24/2012     Flu Vaccine (1) 09/01/2021     FALL RISK ASSESSMENT  09/11/2021     Colorectal Cancer Screening  08/25/2021

## 2021-09-14 NOTE — PROGRESS NOTES
Clinic Care Coordination Contact  Community Health Worker Initial Outreach    CHW Initial Information Gathering:  Referral Source: PCP  Preferred Hospital: [facility] [place]  [phone]  Current living arrangement:: I live in a private home with spouse  Type of residence:: Private home - stairs  Community Resources: None  Supplies used at home:: Nutritional Supplements  Equipment Currently Used at Home: none  Informal Support system:: Family, Friends, Spouse  No PCP office visit in Past Year: No  Transportation means:: Regular car  CHW Additional Questions  If ED/Hospital discharge, follow-up appointment scheduled as recommended?: N/A  Medication changes made following ED/Hospital discharge?: N/A  MyChart active?: Yes  Patient sent Social Determinants of Health questionnaire?: Yes    Patient accepts CC: Yes. Patient scheduled for assessment with YESSENIA Hallamn RN, on 09/15/21 at 1PM. Patient noted desire to discuss CC.     Spoke to Blanquita ANTHONY introduced self and intent of call regarding referral received from PCP.  Referral from PCP. hearing loss, saw ENT - no great resources offered (see ENT note under media tab), wondering about options with insurance/cost savings for hearing aids    Patient states that she's in need of hearing aids.  She would like to explore the best options for hearing aids whether is she needs to look at other insurance options, or if there might be programs to help her get hearing aids at the best cost.    Claudia Sethi, GABRIELLE  Clinic Care Coordination  [facility] : [place], [place], and [place]  Phone: [phone]

## 2021-09-15 ENCOUNTER — PATIENT OUTREACH (OUTPATIENT)
Dept: NURSING | Facility: CLINIC | Age: 71
End: 2021-09-15
Attending: PHYSICIAN ASSISTANT
Payer: COMMERCIAL

## 2021-09-15 DIAGNOSIS — H90.8 MIXED CONDUCTIVE AND SENSORINEURAL HEARING LOSS, UNSPECIFIED LATERALITY: ICD-10-CM

## 2021-09-15 LAB — BACTERIA UR CULT: NORMAL

## 2021-09-15 SDOH — ECONOMIC STABILITY: FOOD INSECURITY: WITHIN THE PAST 12 MONTHS, THE FOOD YOU BOUGHT JUST DIDN'T LAST AND YOU DIDN'T HAVE MONEY TO GET MORE.: NEVER TRUE

## 2021-09-15 SDOH — ECONOMIC STABILITY: TRANSPORTATION INSECURITY
IN THE PAST 12 MONTHS, HAS LACK OF TRANSPORTATION KEPT YOU FROM MEETINGS, WORK, OR FROM GETTING THINGS NEEDED FOR DAILY LIVING?: NO

## 2021-09-15 SDOH — ECONOMIC STABILITY: INCOME INSECURITY: IN THE LAST 12 MONTHS, WAS THERE A TIME WHEN YOU WERE NOT ABLE TO PAY THE MORTGAGE OR RENT ON TIME?: NO

## 2021-09-15 SDOH — ECONOMIC STABILITY: FOOD INSECURITY: WITHIN THE PAST 12 MONTHS, YOU WORRIED THAT YOUR FOOD WOULD RUN OUT BEFORE YOU GOT MONEY TO BUY MORE.: NEVER TRUE

## 2021-09-15 SDOH — ECONOMIC STABILITY: TRANSPORTATION INSECURITY
IN THE PAST 12 MONTHS, HAS THE LACK OF TRANSPORTATION KEPT YOU FROM MEDICAL APPOINTMENTS OR FROM GETTING MEDICATIONS?: NO

## 2021-09-15 ASSESSMENT — SOCIAL DETERMINANTS OF HEALTH (SDOH): HOW HARD IS IT FOR YOU TO PAY FOR THE VERY BASICS LIKE FOOD, HOUSING, MEDICAL CARE, AND HEATING?: NOT HARD AT ALL

## 2021-09-15 ASSESSMENT — ACTIVITIES OF DAILY LIVING (ADL): DEPENDENT_IADLS:: INDEPENDENT

## 2021-09-15 NOTE — LETTER
[facility]  Patient Centered Plan of Care  About Me:        Patient Name:  Blanquita Trevizo    YOB: 1950  Age:         70 year old   [place] MRN:    [id number] Telephone Information:  Home Phone [phone]   Mobile [phone]       Address:  [address]  [facility] [address] Email address:  [email or web address]      Emergency Contact(s)    Name Relationship Lgl Grd Work Phone Home Phone Mobile Phone   ARSALAN WILLETT Spouse   [phone]           Primary language:  English     needed? No   [place] [employer]:  [phone] op. 1  Other communication barriers: None  Preferred Method of Communication:  Phone  Current living arrangement: I live in a private home with spouse  Mobility Status/ Medical Equipment: Independent    Health Maintenance  Health Maintenance Reviewed: Due/Overdue   Health Maintenance Due   Topic Date Due     ZOSTER IMMUNIZATION (2 of 3) 11/24/2012     INFLUENZA VACCINE (1) 09/01/2021     COLORECTAL CANCER SCREENING  08/25/2021         My Access Plan  Medical Emergency 911   Primary Clinic Line [facility] - [phone]   24 Hour Appointment Line [phone] or  [phone]) (toll-free)   24 Hour Nurse Line [phone] (toll-free)   Preferred Urgent Care     Preferred Hospital [facility]  [phone]   Preferred Pharmacy [facility] [address] [place] [id number]     Behavioral Health Crisis Line The National Suicide Prevention Lifeline at 1-952.202.4642 or 911             My Care Team Members  Patient Care Team       Relationship Specialty Notifications Start End    Nasra Herron PA-C PCP - General Physician Assistant  7/30/18     Phone: [phone] Fax: [phone]         [address] [facility] [address]    Kolby Cole MD Assigned Surgical Provider   10/23/20     Phone: [phone] Fax: [phone]         [address] [facility] [address] [facility] [address]    Nasra Herron PA-C Assigned PCP   6/6/21     Phone: [phone] Fax: [phone]         [facility] [address]    Jenni Oconnor, RN Lead Care Coordinator  Admissions 9/15/21             My Care Plans  Self Management and Treatment Plan  Goals and (Comments)  Goals        General     1. Hearing Aid Financial assistance (pt-stated)      Notes - Note edited  9/15/2021  1:29 PM by Jenni Oconnor, RN     Goal Statement: I would like resources for hearing aid financial resources.   Date Goal set: 9/15/21  Barriers: Retired, works part time as    Strengths: Patient is motivated   Date to Achieve By: 3/1/22  Patient expressed understanding of goal: Yes  Action steps to achieve this goal:  1. I understand that RN CC will email hearing aid financial resources.  2. I will review resources and utilize as I see fit.   3. I will continue to work with care coordination for any additional resources and support.                 Current Medications and Allergies:    Current Outpatient Medications   Medication Instructions     Ascorbic Acid (VITAMIN C GUMMIE PO) Oral     calcium carbonate-vitamin D (OS-MARI) 600-400 MG-UNIT chewable tablet 1 chew tab, Oral, 2 TIMES DAILY     multivitamin w/minerals (MULTI-VITAMIN) tablet 1 tablet, Oral, DAILY     Omega-3 Fatty Acids (FISH OIL ADULT GUMMIES) 113.5 MG CHEW Oral     triamcinolone (KENALOG) 0.1 % external cream Topical, 2 TIMES DAILY     Vitamin D (Cholecalciferol) 1,000-2,000 Units, Oral, DAILY         Care Coordination Start Date: 9/15/2021   Frequency of Care Coordination: 2 weeks   Form Last Updated: 09/15/2021
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
## 2021-09-15 NOTE — LETTER
[facility] CARE COORDINATION  [address] [facility] [address]    September 15, 2021    Blanquita Trevizo  [address] [facility] [address]      Dear Blanquita,    I am a clinic care coordinator who works with Nasra Herron PA-C at [facility]. I wanted to thank you for spending the time to talk with me.  Below is a description of clinic care coordination and how I can further assist you.      The clinic care coordination team is made up of a registered nurse,  and community health worker who understand the health care system. The goal of clinic care coordination is to help you manage your health and improve access to the health care system in the most efficient manner. The team can assist you in meeting your health care goals by providing education, coordinating services, strengthening the communication among your providers and supporting you with any resource needs.    Please feel free to contact me with any questions or concerns. We are focused on providing you with the highest-quality healthcare experience possible and that all starts with you.     Sincerely,     Alina Oconnor RN Care Coordinator  [facility]  Email: [email or web address]  Phone: [phone]        Enclosed: I have enclosed a copy of the Patient Centered Plan of Care. This has helpful information and goals that we have talked about. Please keep this in an easy to access place to use as needed.

## 2021-09-16 NOTE — RESULT ENCOUNTER NOTE
Blanquita  I have reviewed your recent labs. Here are the results:    -Normal red blood cell (hgb) levels, normal white blood cell count and normal platelet levels.  -LDL(bad) cholesterol level is elevated which can increase your heart disease risk.  A diet high in fat and simple carbohydrates, genetics and being overweight can contribute to this. ADVISE: exercising 150 minutes of aerobic exercise per week (30 minutes for 5 days per week or 50 minutes for 3 days per week are options) and eating a low saturated fat/low carbohydrate diet are helpful to improve this. In 12 months, you should recheck your fasting cholesterol panel by scheduling a lab-only appointment.  -Liver and gallbladder tests (ALT,AST, Alk phos,bilirubin) are normal.  -Kidney function (GFR) is normal.  -Sodium is normal.  -Potassium is normal.  -Calcium is decreased.  ADVISE: getting more calcium in your diet and ensure you are taking a calcium carbonate supplement twice daily  and then rechecking this in 1 year  -Glucose is slight elevated and may be a sign of early diabetes (prediabetes). ADVISE:: eating a low carbohydrate diet, exercising, trying to lose weight (if necessary) and rechecking your glucose level in 12 months.  -Vitamin D level is normal and getting 1000 IU daily in your diet or supplements is recommended.   -Urine is normal.  -Urine culture is normal and your previous urine culture has cleared completely.       If you have any questions please do not hesitate to contact our office via phone ([phone]) or MyChart.    Nasra Herron, MS, PA-C  [facility]

## 2021-09-21 ENCOUNTER — HOSPITAL ENCOUNTER (OUTPATIENT)
Dept: CARDIOLOGY | Facility: CLINIC | Age: 71
Discharge: HOME | End: 2021-09-21
Attending: PHYSICIAN ASSISTANT | Admitting: PHYSICIAN ASSISTANT
Payer: COMMERCIAL

## 2021-09-21 DIAGNOSIS — R01.1 HEART MURMUR: ICD-10-CM

## 2021-09-21 LAB — LVEF ECHO: NORMAL

## 2021-09-21 PROCEDURE — 93306 TTE W/DOPPLER COMPLETE: CPT | Mod: 26 | Performed by: INTERNAL MEDICINE

## 2021-09-21 PROCEDURE — 93306 TTE W/DOPPLER COMPLETE: CPT

## 2021-09-22 PROBLEM — I34.0 MITRAL VALVE INSUFFICIENCY, UNSPECIFIED ETIOLOGY: Status: ACTIVE | Noted: 2021-09-22

## 2021-09-22 PROBLEM — I35.8 AORTIC VALVE SCLEROSIS: Status: ACTIVE | Noted: 2021-09-22

## 2021-09-22 NOTE — RESULT ENCOUNTER NOTE
"Triage: Please advise of results and recommendations     Blanquita  Here are your recent results.  Your echocardiogram does show some regurgitation through the mitral valve that is mild to moderate in nature and you do have some stiffening or \"sclerosis \"of the aortic valve.  Due to this finding I would like to have you see cardiology for a consultation to see if any medications need to be added/adjusted to prevent worsening of these valvular issues.  This is not a rash.  I will place an order and the cardiology clinic will contact you to schedule.      If you have any questions please do not hesitate to contact our office via phone ([phone]) or MyChart.    Nasra Herron, MS, PA-C  [facility]  "

## 2021-09-30 ENCOUNTER — PATIENT OUTREACH (OUTPATIENT)
Dept: CARE COORDINATION | Facility: CLINIC | Age: 71
End: 2021-09-30

## 2021-09-30 ASSESSMENT — ACTIVITIES OF DAILY LIVING (ADL): DEPENDENT_IADLS:: INDEPENDENT

## 2021-09-30 NOTE — PROGRESS NOTES
Clinic Care Coordination Contact  [facility]    Referral Source: PCP    Clinical Data: Care Coordinator Outreach    Outreach attempted x 1.  Left message on patient's voicemail with call back information and requested return call.    Plan:Care Coordinator will try to reach patient again in 3-5 business days.    Gerda Contreras, RN Care Coordinator     [facility]ulatory Care Management  [facility]   [email or web address]    Office: [phone]

## 2021-10-08 ENCOUNTER — PATIENT OUTREACH (OUTPATIENT)
Dept: NURSING | Facility: CLINIC | Age: 71
End: 2021-10-08
Payer: COMMERCIAL

## 2021-10-08 ASSESSMENT — ACTIVITIES OF DAILY LIVING (ADL): DEPENDENT_IADLS:: INDEPENDENT

## 2021-10-08 NOTE — PROGRESS NOTES
Clinic Care Coordination Contact    Follow Up Progress Note      Assessment: RN CC spoke with patient, Blanquita states that she is doing well. She confirms that she received the resources previously sent out by RN CC. Patient states that she decided to schedule an appointment at [facility], as her friend received hearing aids from them for about $900 per ear. Patient denies the need for any additional support or resources. Patient requests to complete care plan goal and graduate from care coordination at this time.     Care Gaps:    Health Maintenance Due   Topic Date Due     ZOSTER IMMUNIZATION (2 of 3) 11/24/2012     INFLUENZA VACCINE (1) 09/01/2021     COLORECTAL CANCER SCREENING  08/25/2021       Postponed to patient states that she has a list of medical appointments she needs to make and is actively working on them.      Goals addressed this encounter:   Goals Addressed                    This Visit's Progress       COMPLETED: 1. Hearing Aid Financial assistance (pt-stated)   100%      Goal Statement: I would like resources for hearing aid financial resources.   Date Goal set: 9/15/21  Barriers: Retired, works part time as    Strengths: Patient is motivated   Date to Achieve By: 3/1/22  Patient expressed understanding of goal: Yes  Action steps to achieve this goal:  1. I understand that RN CC will email hearing aid financial resources.  2. I will review resources and utilize as I see fit.   3. I will continue to work with care coordination for any additional resources and support.                Intervention/Education provided during outreach: Chronic disease management and support.      Enrollment status: Graduated.      Plan: No further outreaches at this time.  Patient will continue to follow the plan of care.  If new needs arise a new Care Coordination referral may be placed.  FYI to PCP    Alina Oconnor RN Care Coordinator  [facility]  [place]  Email: [email or web address]  Phone: [phone]

## 2021-11-09 ENCOUNTER — OFFICE VISIT (OUTPATIENT)
Dept: CARDIOLOGY | Facility: CLINIC | Age: 71
End: 2021-11-09
Attending: PHYSICIAN ASSISTANT
Payer: COMMERCIAL

## 2021-11-09 VITALS
WEIGHT: 191 LBS | SYSTOLIC BLOOD PRESSURE: 135 MMHG | HEART RATE: 66 BPM | HEIGHT: 64 IN | OXYGEN SATURATION: 95 % | DIASTOLIC BLOOD PRESSURE: 80 MMHG | BODY MASS INDEX: 32.61 KG/M2

## 2021-11-09 DIAGNOSIS — I35.8 AORTIC VALVE SCLEROSIS: ICD-10-CM

## 2021-11-09 DIAGNOSIS — I34.0 MITRAL VALVE INSUFFICIENCY, UNSPECIFIED ETIOLOGY: ICD-10-CM

## 2021-11-09 PROCEDURE — 93000 ELECTROCARDIOGRAM COMPLETE: CPT | Performed by: INTERNAL MEDICINE

## 2021-11-09 PROCEDURE — 99204 OFFICE O/P NEW MOD 45 MIN: CPT | Performed by: INTERNAL MEDICINE

## 2021-11-09 RX ORDER — NAPROXEN SODIUM 220 MG
220 TABLET ORAL DAILY PRN
COMMUNITY

## 2021-11-09 ASSESSMENT — MIFFLIN-ST. JEOR: SCORE: 1356.37

## 2021-11-09 NOTE — LETTER
11/9/2021    Nasra Herron PA-C  [address] [facility] [address]    RE: Blanquita Trevizo       Dear Colleague,    I had the pleasure of seeing Blanquita Trevizo in the [facility].  HISTORY OF PRESENT ILLNESS:   2 kids 3 grandkids . Special ed, teaching in recovery program.  Long standing heart murmur.     Family history brother stroke coronary disease   no heart murmurs cancer in patient parents mom had dementia Dad in car accident 89. Diabetes in 2 brothers.    Allergies    No smoking  No diabetes mellitus   No history of hypertension in pasts  No MI  No family     Exercises swimming  Bursitis.. Had lost 15 lbs. ? Surgery.       Orders this Visit:  Orders Placed This Encounter   Procedures     EKG 12-lead complete w/read - Clinics (performed today)     Orders Placed This Encounter   Medications     naproxen sodium 220 MG capsule     Sig: Take 220 mg by mouth daily as needed     There are no discontinued medications.    Encounter Diagnoses   Name Primary?     Aortic valve sclerosis      Mitral valve insufficiency, unspecified etiology        CURRENT MEDICATIONS:  Current Outpatient Medications   Medication Sig Dispense Refill     Ascorbic Acid (VITAMIN C GUMMIE PO)        calcium carbonate-vitamin D (OS-MARI) 600-400 MG-UNIT chewable tablet Take 1 chew tab by mouth 2 times daily       multivitamin w/minerals (MULTI-VITAMIN) tablet Take 1 tablet by mouth daily       naproxen sodium 220 MG capsule Take 220 mg by mouth daily as needed       Omega-3 Fatty Acids (FISH OIL ADULT GUMMIES) 113.5 MG CHEW        triamcinolone (KENALOG) 0.1 % external cream Apply topically 2 times daily 30 g 3     Vitamin D, Cholecalciferol, 1000 units TABS Take 1-2 tablets (1,000-2,000 Units) by mouth daily         ALLERGIES     Allergies   Allergen Reactions     Fosamax Nausea     And stomach upset - more of a side effect   Alendronic Acid -  "generic name       PAST MEDICAL, SURGICAL, FAMILY, SOCIAL HISTORY:  History was reviewed and updated as needed, see medical record.    Review of Systems:  A 12-point review of systems was completed, see medical record for detailed review of systems information.    Physical Exam:  Vitals: BP (!) 162/88 (BP Location: Right arm, Patient Position: Sitting, Cuff Size: Adult Regular)   Pulse 66   Ht 1.626 m (5' 4\")   Wt 86.6 kg (191 lb)   SpO2 95%   BMI 32.79 kg/m      Constitutional:           Skin:           Head:           Eyes:           ENT:           Neck:           Chest:           Cardiac:                    Abdomen:           Vascular:                                        Extremities and Back:           Neurological:           ASSESSMENT: risk of vascular event. Is 14.5% I have advised statin she is reluctant       RECOMMENDATIONS:   Statin  Follow bp   Wt. Loss  Murmur does not need.      Recent Lab Results:  LIPID RESULTS:  Lab Results   Component Value Date    CHOL 217 (H) 09/14/2021    CHOL 226 (H) 09/11/2020    HDL 57 09/14/2021    HDL 64 09/11/2020     (H) 09/14/2021     (H) 09/11/2020    TRIG 87 09/14/2021    TRIG 94 09/11/2020    CHOLHDLRATIO 3.2 08/06/2015       LIVER ENZYME RESULTS:  Lab Results   Component Value Date    AST 18 09/14/2021    AST 17 08/01/2019    ALT 21 09/14/2021    ALT 28 08/01/2019       CBC RESULTS:  Lab Results   Component Value Date    WBC 5.6 09/14/2021    WBC 6.6 09/11/2020    RBC 4.32 09/14/2021    RBC 4.41 09/11/2020    HGB 12.9 09/14/2021    HGB 13.2 09/11/2020    HCT 40.7 09/14/2021    HCT 42.1 09/11/2020    MCV 94 09/14/2021    MCV 96 09/11/2020    MCH 29.9 09/14/2021    MCH 29.9 09/11/2020    MCHC 31.7 09/14/2021    MCHC 31.4 (L) 09/11/2020    RDW 14.3 09/14/2021    RDW 15.1 (H) 09/11/2020     09/14/2021     09/11/2020       BMP RESULTS:  Lab Results   Component Value Date     09/14/2021     09/11/2020    POTASSIUM 4.2 " 09/14/2021    POTASSIUM 4.3 09/11/2020    CHLORIDE 109 09/14/2021    CHLORIDE 108 09/11/2020    CO2 27 09/14/2021    CO2 29 09/11/2020    ANIONGAP 4 09/14/2021    ANIONGAP 1 (L) 09/11/2020     (H) 09/14/2021     (H) 09/11/2020    BUN 19 09/14/2021    BUN 17 09/11/2020    CR 0.62 09/14/2021    CR 0.67 09/11/2020    GFRESTIMATED 90 09/14/2021    GFRESTIMATED 88 09/11/2020    GFRESTBLACK >90 09/11/2020    MARI 8.2 (L) 09/14/2021    MARI 9.1 09/11/2020        A1C RESULTS:  Lab Results   Component Value Date    A1C 5.5 08/01/2019       INR RESULTS:  No results found for: INR    We greatly appreciate the opportunity to be involved in the care of your patient, Blanquita Trevizo.    Sincerely,  Armani Monet MD        CC  [facility]  [address] [place] [address]
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
## 2021-11-09 NOTE — PROGRESS NOTES
HISTORY OF PRESENT ILLNESS:   2 kids 3 grandkids . Special ed, teaching in recovery program.  Long standing heart murmur.     Family history brother stroke coronary disease   no heart murmurs cancer in patient parents mom had dementia Dad in car accident 93. Diabetes in 2 brothers.    Allergies    No smoking  No diabetes mellitus   No history of hypertension in pasts  No MI  No family     Exercises swimming  Bursitis.. Had lost 15 lbs. ? Surgery.       Orders this Visit:  Orders Placed This Encounter   Procedures     EKG 12-lead complete w/read - Clinics (performed today)     Orders Placed This Encounter   Medications     naproxen sodium 220 MG capsule     Sig: Take 220 mg by mouth daily as needed     There are no discontinued medications.    Encounter Diagnoses   Name Primary?     Aortic valve sclerosis      Mitral valve insufficiency, unspecified etiology        CURRENT MEDICATIONS:  Current Outpatient Medications   Medication Sig Dispense Refill     Ascorbic Acid (VITAMIN C GUMMIE PO)        calcium carbonate-vitamin D (OS-MARI) 600-400 MG-UNIT chewable tablet Take 1 chew tab by mouth 2 times daily       multivitamin w/minerals (MULTI-VITAMIN) tablet Take 1 tablet by mouth daily       naproxen sodium 220 MG capsule Take 220 mg by mouth daily as needed       Omega-3 Fatty Acids (FISH OIL ADULT GUMMIES) 113.5 MG CHEW        triamcinolone (KENALOG) 0.1 % external cream Apply topically 2 times daily 30 g 3     Vitamin D, Cholecalciferol, 1000 units TABS Take 1-2 tablets (1,000-2,000 Units) by mouth daily         ALLERGIES     Allergies   Allergen Reactions     Fosamax Nausea     And stomach upset - more of a side effect   Alendronic Acid - generic name       PAST MEDICAL, SURGICAL, FAMILY, SOCIAL HISTORY:  History was reviewed and updated as needed, see medical record.    Review of Systems:  A 12-point review of systems was completed, see medical record for detailed review of systems  "information.    Physical Exam:  Vitals: BP (!) 162/88 (BP Location: Right arm, Patient Position: Sitting, Cuff Size: Adult Regular)   Pulse 66   Ht 1.626 m (5' 4\")   Wt 86.6 kg (191 lb)   SpO2 95%   BMI 32.79 kg/m      Constitutional:           Skin:           Head:           Eyes:           ENT:           Neck:           Chest:           Cardiac:                    Abdomen:           Vascular:                                        Extremities and Back:           Neurological:           ASSESSMENT: risk of vascular event. Is 14.5% I have advised statin she is reluctant       RECOMMENDATIONS:   Statin  Follow bp   Wt. Loss  Murmur does not need.      Recent Lab Results:  LIPID RESULTS:  Lab Results   Component Value Date    CHOL 217 (H) 09/14/2021    CHOL 226 (H) 09/11/2020    HDL 57 09/14/2021    HDL 64 09/11/2020     (H) 09/14/2021     (H) 09/11/2020    TRIG 87 09/14/2021    TRIG 94 09/11/2020    CHOLHDLRATIO 3.2 08/06/2015       LIVER ENZYME RESULTS:  Lab Results   Component Value Date    AST 18 09/14/2021    AST 17 08/01/2019    ALT 21 09/14/2021    ALT 28 08/01/2019       CBC RESULTS:  Lab Results   Component Value Date    WBC 5.6 09/14/2021    WBC 6.6 09/11/2020    RBC 4.32 09/14/2021    RBC 4.41 09/11/2020    HGB 12.9 09/14/2021    HGB 13.2 09/11/2020    HCT 40.7 09/14/2021    HCT 42.1 09/11/2020    MCV 94 09/14/2021    MCV 96 09/11/2020    MCH 29.9 09/14/2021    MCH 29.9 09/11/2020    MCHC 31.7 09/14/2021    MCHC 31.4 (L) 09/11/2020    RDW 14.3 09/14/2021    RDW 15.1 (H) 09/11/2020     09/14/2021     09/11/2020       BMP RESULTS:  Lab Results   Component Value Date     09/14/2021     09/11/2020    POTASSIUM 4.2 09/14/2021    POTASSIUM 4.3 09/11/2020    CHLORIDE 109 09/14/2021    CHLORIDE 108 09/11/2020    CO2 27 09/14/2021    CO2 29 09/11/2020    ANIONGAP 4 09/14/2021    ANIONGAP 1 (L) 09/11/2020     (H) 09/14/2021     (H) 09/11/2020    BUN 19 " 09/14/2021    BUN 17 09/11/2020    CR 0.62 09/14/2021    CR 0.67 09/11/2020    GFRESTIMATED 90 09/14/2021    GFRESTIMATED 88 09/11/2020    GFRESTBLACK >90 09/11/2020    MARI 8.2 (L) 09/14/2021    MARI 9.1 09/11/2020        A1C RESULTS:  Lab Results   Component Value Date    A1C 5.5 08/01/2019       INR RESULTS:  No results found for: INR    We greatly appreciate the opportunity to be involved in the care of your patient, Blanquita Trevizo.    Sincerely,  Armani Monet MD      CC  Nasra Herron, PA-C  [address] [place] [address]

## 2021-11-10 ENCOUNTER — MYC MEDICAL ADVICE (OUTPATIENT)
Dept: FAMILY MEDICINE | Facility: CLINIC | Age: 71
End: 2021-11-10
Payer: COMMERCIAL

## 2021-11-11 ENCOUNTER — MYC MEDICAL ADVICE (OUTPATIENT)
Dept: FAMILY MEDICINE | Facility: CLINIC | Age: 71
End: 2021-11-11
Payer: COMMERCIAL

## 2022-01-28 DIAGNOSIS — R21 RASH: ICD-10-CM

## 2022-02-01 RX ORDER — TRIAMCINOLONE ACETONIDE 1 MG/G
CREAM TOPICAL 2 TIMES DAILY
Qty: 30 G | Refills: 3 | Status: SHIPPED | OUTPATIENT
Start: 2022-02-01 | End: 2022-11-30

## 2022-02-01 NOTE — TELEPHONE ENCOUNTER
Prescription approved per [facility] Refill Protocol.    Aruna Mauro RN  [facility]

## 2022-02-18 ENCOUNTER — HOSPITAL ENCOUNTER (OUTPATIENT)
Dept: MAMMOGRAPHY | Facility: CLINIC | Age: 72
Discharge: HOME | End: 2022-02-18
Attending: PHYSICIAN ASSISTANT | Admitting: PHYSICIAN ASSISTANT
Payer: COMMERCIAL

## 2022-02-18 DIAGNOSIS — Z12.31 VISIT FOR SCREENING MAMMOGRAM: ICD-10-CM

## 2022-02-18 PROCEDURE — 77067 SCR MAMMO BI INCL CAD: CPT

## 2022-02-18 NOTE — RESULT ENCOUNTER NOTE
Blanquita  Here are your recent results.  They are normal.  If you have any questions please do not hesitate to contact our office via phone ([phone]) or MyChart.    Nasra Herron MS, PA-C  [facility]

## 2022-02-23 ENCOUNTER — OFFICE VISIT (OUTPATIENT)
Dept: FAMILY MEDICINE | Facility: CLINIC | Age: 72
End: 2022-02-23
Payer: COMMERCIAL

## 2022-02-23 VITALS
BODY MASS INDEX: 33.12 KG/M2 | TEMPERATURE: 97.1 F | HEART RATE: 85 BPM | WEIGHT: 194 LBS | SYSTOLIC BLOOD PRESSURE: 128 MMHG | HEIGHT: 64 IN | DIASTOLIC BLOOD PRESSURE: 80 MMHG | OXYGEN SATURATION: 98 %

## 2022-02-23 DIAGNOSIS — R21 RASH: ICD-10-CM

## 2022-02-23 DIAGNOSIS — I25.10 ASCVD (ARTERIOSCLEROTIC CARDIOVASCULAR DISEASE): ICD-10-CM

## 2022-02-23 DIAGNOSIS — I35.8 AORTIC VALVE SCLEROSIS: ICD-10-CM

## 2022-02-23 DIAGNOSIS — M70.62 TROCHANTERIC BURSITIS OF LEFT HIP: ICD-10-CM

## 2022-02-23 DIAGNOSIS — Z12.11 SCREEN FOR COLON CANCER: ICD-10-CM

## 2022-02-23 DIAGNOSIS — S76.012D TEAR OF LEFT GLUTEUS MEDIUS TENDON, SUBSEQUENT ENCOUNTER: ICD-10-CM

## 2022-02-23 DIAGNOSIS — M25.552 HIP PAIN, LEFT: ICD-10-CM

## 2022-02-23 DIAGNOSIS — I34.0 NONRHEUMATIC MITRAL VALVE REGURGITATION: ICD-10-CM

## 2022-02-23 DIAGNOSIS — S76.312D PARTIAL HAMSTRING TEAR, LEFT, SUBSEQUENT ENCOUNTER: Primary | ICD-10-CM

## 2022-02-23 PROCEDURE — 99214 OFFICE O/P EST MOD 30 MIN: CPT | Performed by: PHYSICIAN ASSISTANT

## 2022-02-23 RX ORDER — PREDNISONE 20 MG/1
TABLET ORAL
Qty: 20 TABLET | Refills: 0 | Status: SHIPPED | OUTPATIENT
Start: 2022-02-23 | End: 2022-09-15

## 2022-02-23 RX ORDER — CLOTRIMAZOLE 1 %
CREAM (GRAM) TOPICAL 2 TIMES DAILY
Start: 2022-02-23 | End: 2022-03-09

## 2022-02-23 NOTE — PROGRESS NOTES
Assessment & Plan     Screen for colon cancer  Patient due for colonoscopy.  Reordered today.  - Adult Gastro Ref - Procedure Only    Hip pain, left  Trochanteric bursitis of left hip  Persistent left hip trochanteric bursitis.  Did not respond to injections like it has in previous years.  Is wondering about alternative options.  Has had x-rays but has not had advanced imaging.  Would be willing to try an oral steroid.  - MR Hip Left w/o Contrast  - Physical Therapy Referral  - predniSONE (DELTASONE) 20 MG tablet  Dispense: 20 tablet; Refill: 0    ASCVD (arteriosclerotic cardiovascular disease)  Aortic valve sclerosis  Nonrheumatic mitral valve regurgitation  Elevated ASCVD score.  Patient does meet the recommendations for statin therapy.  She is apprehensive to start.  We can recheck her labs and reevaluate her ASCVD score to discuss at her annual visit.  Patient was happy with this plan.  - Lipid panel reflex to direct LDL Fasting    Rash  Recommend to attempt a trial of clotrimazole twice daily for 14 days.  She will keep me apprised if this does not improve her symptoms or if it is worsening.  - clotrimazole (LOTRIMIN) 1 % external cream      Review of prior external note(s) from - [facility] information from [facility]  reviewed      Return in about 9 days (around 3/4/2022) for MRI.    TANIKA Lopez [facility]   Blanquita is a 71 year old who presents for the following health issues     HPI     Pain History:  When did you first notice your pain? - More than 6 weeks   Have you seen this provider for your pain in the past? No   Where in your body do your have pain? Left hip  Are you seeing anyone else for your pain? Yes - SHERRON Herbert - 10/28/2021, 01/28/2022 injection each time. PT is doing exercises  What makes your pain better? Initially injection worked, Aleve daily (takes once daily)  What makes your pain worse? 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 , Tenzin      PEG Score 2/23/2022   PEG Total Score 3     Previous medication treatments:      Rash  The patient reports a circular violaceous rash on her left hand since last summer.  She is wondering if she was exposed to something or if it was an insect bite.  It does not seem to have changed much.  It did itch initially.    Heart murmur/hyperlipidemia  Identified an incidental murmur during her annual physical and she obtained an echocardiogram.  This revealed aortic valve sclerosis and mitral valve insufficiency.  She does have an elevated ASCVD score and so cardiology, Dr. Cannon has recommended initiation of a statin.  She has been apprehensive to date.  She would like to work on diet and exercise.  She does have a strong family history of cardiac disease.    Recent Labs   Lab Test 09/14/21  0819 09/11/20  0915 09/08/16  0913 08/06/15  0911   CHOL 217* 226*   < > 202*   HDL 57 64   < > 64   * 143*   < > 119   TRIG 87 94   < > 94   CHOLHDLRATIO  --   --   --  3.2    < > = values in this interval not displayed.         Review of Systems   Constitutional, HEENT, cardiovascular, pulmonary, GI, , musculoskeletal, neuro, skin, endocrine and psych systems are negative, except as otherwise noted.      Objective    /80 (BP Location: Right arm, Patient Position: Chair, Cuff Size: Adult Large)   Pulse 85   Temp 97.1  F (36.2  C) (Tympanic)   Ht 1.626 m (5' 4\")   Wt 88 kg (194 lb)   SpO2 98%   Breastfeeding No   BMI 33.30 kg/m    Body mass index is 33.3 kg/m .  Physical Exam   GENERAL: healthy, alert and no distress  EYES: Eyes grossly normal to inspection, PERRL and conjunctivae and sclerae normal  RESP: lungs clear to auscultation - no rales, rhonchi or wheezes  CV: regular rate and rhythm, normal S1 S2, no S3 or S4, no murmur, click or rub, no " peripheral edema and peripheral pulses strong  MS: Tenderness with significant guarding to the left greater trochanteric bursa  SKIN: Well-defined, violaceous rash on the dorsal aspect of the left hand with a raised border  NEURO: Normal strength and tone, mentation intact and speech normal  PSYCH: mentation appears normal, affect normal/bright      [facility]  Outside Information             XR Pelvis W Lt Lateral Hip    Anatomical Region Laterality Modality   Pelvis -- Digital Radiography   Hip -- --       Impression  Performed by [facility]  COMPARISON:  09/23/2014.     FINDINGS: No acute fracture or dislocation identified. Mild axial joint space narrowing of the left hip similar to previous. Joint spaces otherwise preserved. Couple of small rounded ossific densities along the lateral margin of the left superior acetabulum as seen on lateral view may reflect sequela of old trauma/ossicles. Mild degenerative change lower lumbar spine.         The 10-year ASCVD risk score (Miky KIMBERLEE Jr., et al., 2013) is: 13.2%    Values used to calculate the score:      Age: 71 years      Sex: Female      Is Non- : No      Diabetic: No      Tobacco smoker: No      Systolic Blood Pressure: 128 mmHg      Is BP treated: No      HDL Cholesterol: 57 mg/dL      Total Cholesterol: 217 mg/dL

## 2022-03-04 ENCOUNTER — HOSPITAL ENCOUNTER (OUTPATIENT)
Dept: MRI IMAGING | Facility: CLINIC | Age: 72
Discharge: HOME | End: 2022-03-04
Attending: PHYSICIAN ASSISTANT | Admitting: PHYSICIAN ASSISTANT
Payer: COMMERCIAL

## 2022-03-04 DIAGNOSIS — M70.62 TROCHANTERIC BURSITIS OF LEFT HIP: ICD-10-CM

## 2022-03-04 PROCEDURE — 73721 MRI JNT OF LWR EXTRE W/O DYE: CPT | Mod: 26 | Performed by: RADIOLOGY

## 2022-03-04 PROCEDURE — 73721 MRI JNT OF LWR EXTRE W/O DYE: CPT | Mod: LT

## 2022-03-07 NOTE — RESULT ENCOUNTER NOTE
Triage: Please call patient and advise of results and recommendations     Blanquita  Here are your recent results.  Your hip MRI shows some mild tearing of the proximal hamstring and mild tearing of the one of the deep gluteal muscle tendons.  Trochanteric bursitis is also evident.     Due to these abnormalities I would like you to follow-up with a hip subspecialist.  I would recommend Dr. Mason Hope with [facility] orthopedics as he said specializes in these areas.  I can place the referral and ask his assistant to call you directly.  Alternatively, you can call [phone] directly to schedule.  He will likely have some conservative options as well as possible surgical options to address your persistent and recurrent left hip pain.      If you have any questions please do not hesitate to contact our office via phone ([phone]) or MyChart.    Nasra Herron, MS, PAXiaoC  [facility] [place]

## 2022-03-09 ENCOUNTER — LAB (OUTPATIENT)
Dept: LAB | Facility: CLINIC | Age: 72
End: 2022-03-09
Payer: COMMERCIAL

## 2022-03-09 DIAGNOSIS — I34.0 NONRHEUMATIC MITRAL VALVE REGURGITATION: ICD-10-CM

## 2022-03-09 DIAGNOSIS — I35.8 AORTIC VALVE SCLEROSIS: ICD-10-CM

## 2022-03-09 DIAGNOSIS — I25.10 ASCVD (ARTERIOSCLEROTIC CARDIOVASCULAR DISEASE): ICD-10-CM

## 2022-03-09 PROCEDURE — 36415 COLL VENOUS BLD VENIPUNCTURE: CPT

## 2022-03-09 PROCEDURE — 80061 LIPID PANEL: CPT

## 2022-03-10 LAB
CHOLEST SERPL-MCNC: 239 MG/DL
FASTING STATUS PATIENT QL REPORTED: YES
HDLC SERPL-MCNC: 76 MG/DL
LDLC SERPL CALC-MCNC: 147 MG/DL
NONHDLC SERPL-MCNC: 163 MG/DL
TRIGL SERPL-MCNC: 81 MG/DL

## 2022-03-10 NOTE — RESULT ENCOUNTER NOTE
Blanquita  I have reviewed your recent labs. Here are the results:    -Your cholesterol remains essentially unchanged from what it was 5 months ago.  I would strongly recommend a trial of a low-dose statin to decrease your cardiovascular risk score.  Even taking rosuvastatin at a very low dose a few times a week would be beneficial.  Please let me know if you would like me to send this in.      If you have any questions please do not hesitate to contact our office via phone ([phone]) or [employer].    Nasra Herron, MS, PAXiaoC  [facility] [place]    The 10-year ASCVD risk score (Kincaidjaycee MOHAN Jr., et al., 2013) is: 13.2%    Values used to calculate the score:      Age: 71 years      Sex: Female      Is Non- : No      Diabetic: No      Tobacco smoker: No      Systolic Blood Pressure: 128 mmHg      Is BP treated: No      HDL Cholesterol: 76 mg/dL      Total Cholesterol: 239 mg/dL

## 2022-09-14 ASSESSMENT — ACTIVITIES OF DAILY LIVING (ADL): CURRENT_FUNCTION: NO ASSISTANCE NEEDED

## 2022-09-15 ENCOUNTER — OFFICE VISIT (OUTPATIENT)
Dept: FAMILY MEDICINE | Facility: CLINIC | Age: 72
End: 2022-09-15
Payer: COMMERCIAL

## 2022-09-15 VITALS
BODY MASS INDEX: 34.38 KG/M2 | SYSTOLIC BLOOD PRESSURE: 122 MMHG | RESPIRATION RATE: 18 BRPM | OXYGEN SATURATION: 95 % | TEMPERATURE: 96.8 F | DIASTOLIC BLOOD PRESSURE: 78 MMHG | HEIGHT: 64 IN | HEART RATE: 67 BPM | WEIGHT: 201.4 LBS

## 2022-09-15 DIAGNOSIS — I35.8 AORTIC VALVE SCLEROSIS: ICD-10-CM

## 2022-09-15 DIAGNOSIS — Z90.81 S/P SPLENECTOMY: ICD-10-CM

## 2022-09-15 DIAGNOSIS — Z78.0 ASYMPTOMATIC MENOPAUSAL STATE: ICD-10-CM

## 2022-09-15 DIAGNOSIS — E78.5 HYPERLIPIDEMIA LDL GOAL <130: ICD-10-CM

## 2022-09-15 DIAGNOSIS — M85.80 OSTEOPENIA, UNSPECIFIED LOCATION: ICD-10-CM

## 2022-09-15 DIAGNOSIS — N30.00 ACUTE CYSTITIS WITHOUT HEMATURIA: ICD-10-CM

## 2022-09-15 DIAGNOSIS — Z23 NEED FOR SHINGLES VACCINE: ICD-10-CM

## 2022-09-15 DIAGNOSIS — Z13.0 SCREENING FOR DEFICIENCY ANEMIA: ICD-10-CM

## 2022-09-15 DIAGNOSIS — I25.10 ASCVD (ARTERIOSCLEROTIC CARDIOVASCULAR DISEASE): ICD-10-CM

## 2022-09-15 DIAGNOSIS — Z00.00 ENCOUNTER FOR MEDICARE ANNUAL WELLNESS EXAM: Primary | ICD-10-CM

## 2022-09-15 DIAGNOSIS — Z13.1 SCREENING FOR DIABETES MELLITUS: ICD-10-CM

## 2022-09-15 DIAGNOSIS — Z13.29 SCREENING FOR THYROID DISORDER: ICD-10-CM

## 2022-09-15 DIAGNOSIS — D12.6 TUBULAR ADENOMA OF COLON: ICD-10-CM

## 2022-09-15 DIAGNOSIS — C54.1 ENDOMETRIAL CANCER (H): ICD-10-CM

## 2022-09-15 DIAGNOSIS — R39.15 URINARY URGENCY: ICD-10-CM

## 2022-09-15 DIAGNOSIS — K43.2 INCISIONAL HERNIA, WITHOUT OBSTRUCTION OR GANGRENE: ICD-10-CM

## 2022-09-15 DIAGNOSIS — I34.0 MITRAL VALVE INSUFFICIENCY, UNSPECIFIED ETIOLOGY: ICD-10-CM

## 2022-09-15 DIAGNOSIS — Z12.11 SCREEN FOR COLON CANCER: ICD-10-CM

## 2022-09-15 DIAGNOSIS — Z12.31 VISIT FOR SCREENING MAMMOGRAM: ICD-10-CM

## 2022-09-15 DIAGNOSIS — E66.811 OBESITY, CLASS I, BMI 30.0-34.9 (SEE ACTUAL BMI): ICD-10-CM

## 2022-09-15 DIAGNOSIS — S32.010S COMPRESSION FRACTURE OF L1 VERTEBRA, SEQUELA: ICD-10-CM

## 2022-09-15 LAB
ALBUMIN UR-MCNC: NEGATIVE MG/DL
APPEARANCE UR: CLEAR
BACTERIA #/AREA URNS HPF: ABNORMAL /HPF
BILIRUB UR QL STRIP: NEGATIVE
COLOR UR AUTO: YELLOW
ERYTHROCYTE [DISTWIDTH] IN BLOOD BY AUTOMATED COUNT: 14.6 % (ref 10–15)
GLUCOSE UR STRIP-MCNC: NEGATIVE MG/DL
HCT VFR BLD AUTO: 41.3 % (ref 35–47)
HGB BLD-MCNC: 13.1 G/DL (ref 11.7–15.7)
HGB UR QL STRIP: NEGATIVE
KETONES UR STRIP-MCNC: NEGATIVE MG/DL
LEUKOCYTE ESTERASE UR QL STRIP: ABNORMAL
MCH RBC QN AUTO: 29.4 PG (ref 26.5–33)
MCHC RBC AUTO-ENTMCNC: 31.7 G/DL (ref 31.5–36.5)
MCV RBC AUTO: 93 FL (ref 78–100)
NITRATE UR QL: POSITIVE
PH UR STRIP: 7 [PH] (ref 5–7)
PLATELET # BLD AUTO: 291 10E3/UL (ref 150–450)
RBC # BLD AUTO: 4.46 10E6/UL (ref 3.8–5.2)
RBC #/AREA URNS AUTO: ABNORMAL /HPF
SP GR UR STRIP: 1.02 (ref 1–1.03)
UROBILINOGEN UR STRIP-ACNC: 2 E.U./DL
WBC # BLD AUTO: 5.3 10E3/UL (ref 4–11)
WBC #/AREA URNS AUTO: ABNORMAL /HPF

## 2022-09-15 PROCEDURE — 85027 COMPLETE CBC AUTOMATED: CPT | Performed by: PHYSICIAN ASSISTANT

## 2022-09-15 PROCEDURE — 99214 OFFICE O/P EST MOD 30 MIN: CPT | Mod: 25 | Performed by: PHYSICIAN ASSISTANT

## 2022-09-15 PROCEDURE — 84443 ASSAY THYROID STIM HORMONE: CPT | Performed by: PHYSICIAN ASSISTANT

## 2022-09-15 PROCEDURE — 80053 COMPREHEN METABOLIC PANEL: CPT | Performed by: PHYSICIAN ASSISTANT

## 2022-09-15 PROCEDURE — 87186 SC STD MICRODIL/AGAR DIL: CPT | Performed by: PHYSICIAN ASSISTANT

## 2022-09-15 PROCEDURE — 80061 LIPID PANEL: CPT | Performed by: PHYSICIAN ASSISTANT

## 2022-09-15 PROCEDURE — 36415 COLL VENOUS BLD VENIPUNCTURE: CPT | Performed by: PHYSICIAN ASSISTANT

## 2022-09-15 PROCEDURE — 87086 URINE CULTURE/COLONY COUNT: CPT | Performed by: PHYSICIAN ASSISTANT

## 2022-09-15 PROCEDURE — G0439 PPPS, SUBSEQ VISIT: HCPCS | Performed by: PHYSICIAN ASSISTANT

## 2022-09-15 PROCEDURE — 81001 URINALYSIS AUTO W/SCOPE: CPT | Performed by: PHYSICIAN ASSISTANT

## 2022-09-15 ASSESSMENT — ACTIVITIES OF DAILY LIVING (ADL): CURRENT_FUNCTION: NO ASSISTANCE NEEDED

## 2022-09-15 NOTE — PATIENT INSTRUCTIONS
Patient Education   Personalized Prevention Plan  You are due for the preventive services outlined below.  Your care team is available to assist you in scheduling these services.  If you have already completed any of these items, please share that information with your care team to update in your medical record.  Health Maintenance Due   Topic Date Due    Meningitis A Vaccine (1 - Risk start 2-23 months series) Never done    Zoster (Shingles) Vaccine (1 of 2) 11/24/2012    Colorectal Cancer Screening  08/25/2021    COVID-19 Vaccine (4 - Booster for Moderna series) 12/14/2021    Flu Vaccine (1) 09/01/2022    Comprehensive Metabolic Panel  09/14/2022    Complete Blood Count  09/14/2022
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
## 2022-09-16 LAB
ALBUMIN SERPL-MCNC: 3.6 G/DL (ref 3.4–5)
ALP SERPL-CCNC: 44 U/L (ref 40–150)
ALT SERPL W P-5'-P-CCNC: 22 U/L (ref 0–50)
ANION GAP SERPL CALCULATED.3IONS-SCNC: 9 MMOL/L (ref 3–14)
AST SERPL W P-5'-P-CCNC: 20 U/L (ref 0–45)
BILIRUB SERPL-MCNC: 0.5 MG/DL (ref 0.2–1.3)
BUN SERPL-MCNC: 13 MG/DL (ref 7–30)
CALCIUM SERPL-MCNC: 9 MG/DL (ref 8.5–10.1)
CHLORIDE BLD-SCNC: 109 MMOL/L (ref 94–109)
CHOLEST SERPL-MCNC: 199 MG/DL
CO2 SERPL-SCNC: 23 MMOL/L (ref 20–32)
CREAT SERPL-MCNC: 0.56 MG/DL (ref 0.52–1.04)
FASTING STATUS PATIENT QL REPORTED: YES
GFR SERPL CREATININE-BSD FRML MDRD: >90 ML/MIN/1.73M2
GLUCOSE BLD-MCNC: 106 MG/DL (ref 70–99)
HDLC SERPL-MCNC: 59 MG/DL
LDLC SERPL CALC-MCNC: 125 MG/DL
NONHDLC SERPL-MCNC: 140 MG/DL
POTASSIUM BLD-SCNC: 4.3 MMOL/L (ref 3.4–5.3)
PROT SERPL-MCNC: 7.3 G/DL (ref 6.8–8.8)
SODIUM SERPL-SCNC: 141 MMOL/L (ref 133–144)
TRIGL SERPL-MCNC: 75 MG/DL
TSH SERPL DL<=0.005 MIU/L-ACNC: 1.47 MU/L (ref 0.4–4)

## 2022-09-17 LAB — BACTERIA UR CULT: ABNORMAL

## 2022-09-19 PROBLEM — K43.2 INCISIONAL HERNIA: Status: ACTIVE | Noted: 2020-08-03

## 2022-09-19 PROBLEM — M79.604 RIGHT LEG PAIN: Status: ACTIVE | Noted: 2022-04-04

## 2022-09-19 PROBLEM — K43.2 INCISIONAL HERNIA, WITHOUT OBSTRUCTION OR GANGRENE: Status: ACTIVE | Noted: 2022-09-19

## 2022-09-19 PROBLEM — M79.604 RIGHT LEG PAIN: Status: RESOLVED | Noted: 2022-04-04 | Resolved: 2022-09-19

## 2022-09-19 PROBLEM — Z90.81 S/P SPLENECTOMY: Status: ACTIVE | Noted: 2022-09-19

## 2022-09-19 PROBLEM — I25.10 ASCVD (ARTERIOSCLEROTIC CARDIOVASCULAR DISEASE): Status: ACTIVE | Noted: 2022-09-19

## 2022-09-19 PROBLEM — S32.010S COMPRESSION FRACTURE OF L1 VERTEBRA, SEQUELA: Status: ACTIVE | Noted: 2022-09-19

## 2022-09-19 RX ORDER — ZOSTER VACCINE RECOMBINANT, ADJUVANTED 50 MCG/0.5
1 KIT INTRAMUSCULAR ONCE
Qty: 0.5 ML | Refills: 1 | Status: SHIPPED | OUTPATIENT
Start: 2022-09-19 | End: 2022-09-19

## 2022-09-19 RX ORDER — ROSUVASTATIN CALCIUM 20 MG/1
20 TABLET, COATED ORAL
Qty: 24 TABLET | Refills: 3 | Status: SHIPPED | OUTPATIENT
Start: 2022-09-19 | End: 2022-11-30

## 2022-09-19 RX ORDER — CEPHALEXIN 500 MG/1
500 CAPSULE ORAL 2 TIMES DAILY
Qty: 14 CAPSULE | Refills: 0 | Status: SHIPPED | OUTPATIENT
Start: 2022-09-19 | End: 2022-09-26

## 2022-09-19 NOTE — RESULT ENCOUNTER NOTE
Triage: Please call patient and advise of results/recommendations especially regarding the acute cystitis that has been found and the antibiotic that was sent to her pharmacy.      Blanquita  I have reviewed your recent labs. Here are the results:    -Normal red blood cell (hgb) levels, normal white blood cell count and normal platelet levels.  -LDL(bad) cholesterol level is elevated which can increase your heart disease risk.  A diet high in fat and simple carbohydrates, genetics and being overweight can contribute to this. ADVISE: Your cardiovascular risk score is still elevated and meets criteria to treat with statin therapy, however, I do think it is worthwhile to have the CT coronary score testing to see if any plaque presence/calcium deposits are evident in your coronary arteries before making a decision to start this therapy.  I will keep you apprised of the results when those are available.    -Liver and gallbladder tests (ALT,AST, Alk phos,bilirubin) are normal.  -Kidney function (GFR) is normal.  -Sodium is normal.  -Potassium is normal.  -Calcium is normal.  -Glucose is slight elevated and may be a sign of early diabetes (prediabetes). ADVISE:: eating a low carbohydrate diet, exercising, trying to lose weight (if necessary) and rechecking your glucose level in 12 months.  -TSH (thyroid stimulating hormone) level is normal which indicates normal thyroid function.  -Urine culture is abnormal and indicates a urinary tract infection that could be causing your urinary urgency.  I did send an antibiotic to your pharmacy.  Complete the medication as prescribed and if you experience new, worsening or persistent symptoms, you should call or return for a recheck.       If you have any questions please do not hesitate to contact our office via phone ([phone]) or MyChart.    Nasra Herron MBA, MS, PA-C  [facility]    The 10-year ASCVD risk score ([place] DC Jr., et al., 2013) is: 11.9%     Values used to calculate the score:      Age: 71 years      Sex: Female      Is Non- : No      Diabetic: No      Tobacco smoker: No      Systolic Blood Pressure: 122 mmHg      Is BP treated: No      HDL Cholesterol: 59 mg/dL      Total Cholesterol: 199 mg/dL

## 2022-09-26 ENCOUNTER — TELEPHONE (OUTPATIENT)
Dept: GASTROENTEROLOGY | Facility: CLINIC | Age: 72
End: 2022-09-26

## 2022-10-06 ENCOUNTER — TELEPHONE (OUTPATIENT)
Dept: FAMILY MEDICINE | Facility: CLINIC | Age: 72
End: 2022-10-06

## 2022-10-06 DIAGNOSIS — R30.0 DYSURIA: ICD-10-CM

## 2022-10-06 DIAGNOSIS — R35.0 URINARY FREQUENCY: Primary | ICD-10-CM

## 2022-10-06 NOTE — TELEPHONE ENCOUNTER
Reason for Call: Request for an order or referral:    Order or referral being requested: UA LAB     Date needed: as soon as possible    Has the patient been seen by the PCP for this problem? YES    Additional comments: PATIENT IS REQUESTING UA LAB FROM PCP, NATHANIEL BONILLA, SHE HAS BEEN SEEN RECENTLY FOR UTI AND WAS GIVEN PRESCRIPTION. THE PATIENT FEELS THE UTI IS STILL PRESENT.     Phone number Patient can be reached at:  Cell number on file:    Telephone Information:   Mobile [phone]       Best Time:  ANYTIME EXCEPT 1:30 PM - 2:30 PM     Can we leave a detailed message on this number?  YES    Call taken on 10/6/2022 at 12:52 PM by Davon Wiggins

## 2022-10-07 ENCOUNTER — LAB (OUTPATIENT)
Dept: LAB | Facility: CLINIC | Age: 72
End: 2022-10-07
Payer: COMMERCIAL

## 2022-10-07 DIAGNOSIS — R35.0 URINARY FREQUENCY: ICD-10-CM

## 2022-10-07 DIAGNOSIS — R30.0 DYSURIA: ICD-10-CM

## 2022-10-07 LAB
ALBUMIN UR-MCNC: NEGATIVE MG/DL
APPEARANCE UR: CLEAR
BACTERIA #/AREA URNS HPF: ABNORMAL /HPF
BILIRUB UR QL STRIP: NEGATIVE
COLOR UR AUTO: YELLOW
GLUCOSE UR STRIP-MCNC: NEGATIVE MG/DL
HGB UR QL STRIP: NEGATIVE
KETONES UR STRIP-MCNC: NEGATIVE MG/DL
LEUKOCYTE ESTERASE UR QL STRIP: ABNORMAL
NITRATE UR QL: NEGATIVE
PH UR STRIP: 6 [PH] (ref 5–7)
RBC #/AREA URNS AUTO: ABNORMAL /HPF
SP GR UR STRIP: 1.02 (ref 1–1.03)
UROBILINOGEN UR STRIP-ACNC: 2 E.U./DL
WBC #/AREA URNS AUTO: ABNORMAL /HPF

## 2022-10-07 PROCEDURE — 87186 SC STD MICRODIL/AGAR DIL: CPT

## 2022-10-07 PROCEDURE — 87086 URINE CULTURE/COLONY COUNT: CPT

## 2022-10-07 PROCEDURE — 81001 URINALYSIS AUTO W/SCOPE: CPT

## 2022-10-07 NOTE — TELEPHONE ENCOUNTER
Called and spoke with patient.     Going frequently. Every once in a while, there's irritation when urinating. Patient has finished antibiotics.     No fever  No hematuria.   No cloudy or foul-smelling.     Lab appointment scheduled.     Future Appointments   Date Time Provider Department Center   10/7/2022  3:00 PM RV LAB RVLABR RV   11[facility]7/2022  4[facility]        LEOBARDO HOLLEY RN on 10/7/2022 at 12:55 PM   [facility]

## 2022-10-08 LAB — BACTERIA UR CULT: ABNORMAL

## 2022-10-10 DIAGNOSIS — N39.0 URINARY TRACT INFECTION: Primary | ICD-10-CM

## 2022-10-10 RX ORDER — SULFAMETHOXAZOLE/TRIMETHOPRIM 800-160 MG
1 TABLET ORAL 2 TIMES DAILY
Qty: 14 TABLET | Refills: 0 | Status: SHIPPED | OUTPATIENT
Start: 2022-10-10 | End: 2022-11-30

## 2022-10-16 ENCOUNTER — HEALTH MAINTENANCE LETTER (OUTPATIENT)
Age: 72
End: 2022-10-16

## 2022-10-24 ENCOUNTER — LAB (OUTPATIENT)
Dept: LAB | Facility: CLINIC | Age: 72
End: 2022-10-24
Payer: COMMERCIAL

## 2022-10-24 ENCOUNTER — DOCUMENTATION ONLY (OUTPATIENT)
Dept: LAB | Facility: CLINIC | Age: 72
End: 2022-10-24

## 2022-10-24 DIAGNOSIS — R35.0 URINARY FREQUENCY: Primary | ICD-10-CM

## 2022-10-24 DIAGNOSIS — R35.0 URINARY FREQUENCY: ICD-10-CM

## 2022-10-24 DIAGNOSIS — R30.0 DYSURIA: ICD-10-CM

## 2022-10-24 LAB
ALBUMIN UR-MCNC: NEGATIVE MG/DL
APPEARANCE UR: CLEAR
BACTERIA #/AREA URNS HPF: ABNORMAL /HPF
BILIRUB UR QL STRIP: NEGATIVE
COLOR UR AUTO: YELLOW
GLUCOSE UR STRIP-MCNC: NEGATIVE MG/DL
HGB UR QL STRIP: NEGATIVE
KETONES UR STRIP-MCNC: NEGATIVE MG/DL
LEUKOCYTE ESTERASE UR QL STRIP: NEGATIVE
NITRATE UR QL: NEGATIVE
PH UR STRIP: 6 [PH] (ref 5–7)
RBC #/AREA URNS AUTO: ABNORMAL /HPF
SP GR UR STRIP: 1.02 (ref 1–1.03)
UROBILINOGEN UR STRIP-ACNC: 1 E.U./DL
WBC #/AREA URNS AUTO: ABNORMAL /HPF

## 2022-10-24 PROCEDURE — 87086 URINE CULTURE/COLONY COUNT: CPT

## 2022-10-24 PROCEDURE — 81001 URINALYSIS AUTO W/SCOPE: CPT

## 2022-10-24 NOTE — LETTER
[facility]  [address], [place] [address] [phone]                    October 28, 2022    Blanquita Trevizo  [address] [address]      Dear Blanquita,    Here is a summary of your recent test results:    Urine is negative   We advise: Follow up if any issues persist For additional lab test information, [email or web address] is an excellent reference.  Your test results are enclosed.  Please contact me if you have any questions.In addition, here is a list of due or overdue Health Maintenance reminders.    Health Maintenance Due   Topic Date Due     Meningitis A Vaccine (1 - Risk start 2-23 months series) Never done     Hepatitis B Vaccine (3 of 3 - 3-dose series) 10/14/2008     Zoster (Shingles) Vaccine (1 of 2) 11/24/2012     Colorectal Cancer Screening  08/25/2021     COVID-19 Vaccine (4 - Booster for Moderna series) 11/09/2021     Osteoporosis Screening  08/28/2022     Flu Vaccine (1) 09/01/2022       Please call us at [phone] (or use [employer]) to address the above recommendations.            Thank you very much for trusting [facility].     Healthy regards,          Kervin Martinez M.D.        Results for orders placed or performed in visit on 10/24/22   UA with Microscopic - lab collect     Status: Normal   Result Value Ref Range    Color Urine Yellow Colorless, Straw, Light Yellow, Yellow    Appearance Urine Clear Clear    Glucose Urine Negative Negative, 1000 , >=2000 mg/dL    Bilirubin Urine Negative Negative    Ketones Urine Negative Negative, 160  mg/dL    Specific Gravity Urine 1.020 1.003 - 1.035    Blood Urine Negative Negative    pH Urine 6.0 5.0 - 7.0    Protein Albumin Urine Negative Negative, 300 , >=2000 mg/dL    Urobilinogen Urine 1.0 0.2, 1.0 E.U./dL    Nitrite Urine Negative Negative    Leukocyte Esterase Urine Negative Negative   Urine Microscopic Exam     Status: Abnormal   Result Value Ref Range    Bacteria Urine  Few (A) None Seen /HPF    RBC Urine 0-2 0-2 /HPF /HPF    WBC Urine 0-5 0-5 /HPF /HPF   Urine Culture Aerobic Bacterial - lab collect     Status: None    Specimen: Urine, Midstream   Result Value Ref Range    Culture <10,000 CFU/mL Mixture of urogenital lj

## 2022-10-27 LAB — BACTERIA UR CULT: NORMAL

## 2022-11-07 ENCOUNTER — HOSPITAL ENCOUNTER (OUTPATIENT)
Dept: CT IMAGING | Facility: CLINIC | Age: 72
Discharge: HOME | End: 2022-11-07
Attending: PHYSICIAN ASSISTANT | Admitting: PHYSICIAN ASSISTANT

## 2022-11-07 DIAGNOSIS — I25.10 ASCVD (ARTERIOSCLEROTIC CARDIOVASCULAR DISEASE): ICD-10-CM

## 2022-11-07 DIAGNOSIS — E78.5 HYPERLIPIDEMIA LDL GOAL <130: ICD-10-CM

## 2022-11-07 PROCEDURE — 75571 CT HRT W/O DYE W/CA TEST: CPT

## 2022-11-07 PROCEDURE — 75571 CT HRT W/O DYE W/CA TEST: CPT | Mod: 26 | Performed by: INTERNAL MEDICINE

## 2022-11-08 NOTE — RESULT ENCOUNTER NOTE
Blanquita  I have reviewed your recent test results:    -Your CT scan noncardiac findings show your ventral hernia presents.  This is obviously a known issue for you.  I will keep you apprised of the cardiac findings when the results are available    For additional lab test information, [email or web address] is an excellent reference.     If you have any questions please do not hesitate to contact our office via phone ([phone]) or [email or web address].    Healthy regards,     Nasra Herron MBA, MS, PA-C  [facility]

## 2022-11-08 NOTE — RESULT ENCOUNTER NOTE
Blanquita  I have reviewed your recent test results:    Your CT coronary score does show that you have plaque presence in 2 of the main coronary arteries putting you at a intermediate risk risk for a coronary event.  This reinforces my recommendation to start a the rosuvastatin twice weekly that I sent to her pharmacy in September to decrease further plaque burden and decrease overall risk of a cardiovascular event.    Healthy reginaldo,    Nasra Herron MBA, MS, TANIKA DYE [facility]        Absolute score (does not account for age, gender, race, etc.). Score 0 = very low risk,  score 1-100 = low risk, score 101-299 = intermediate risk,  scores >300 = coronary heart disease (CHD) risk equivalent  Relative score considers percentile of plaque burden based on age, and gender. If >75th percentile, then considered high risk. If >90th percentile, then CHD risk equivalent    For additional lab test information, [email or web address] is an excellent reference.     If you have any questions please do not hesitate to contact our office via phone ([phone]) or [email or web address].    Nasra Mancuso MBA, MS, TANIKA DYE [facility]

## 2022-11-10 ENCOUNTER — TELEPHONE (OUTPATIENT)
Dept: GASTROENTEROLOGY | Facility: CLINIC | Age: 72
End: 2022-11-10

## 2022-11-10 NOTE — TELEPHONE ENCOUNTER
Received a [employer] message reply on 11/9 stating that pt had a colonoscopy in 2021. LVM to explain that a new referral was placed September 2021. If she would like to schedule she can call back.

## 2022-11-30 ENCOUNTER — VIRTUAL VISIT (OUTPATIENT)
Dept: FAMILY MEDICINE | Facility: CLINIC | Age: 72
End: 2022-11-30
Payer: COMMERCIAL

## 2022-11-30 DIAGNOSIS — K43.2 INCISIONAL HERNIA, WITHOUT OBSTRUCTION OR GANGRENE: ICD-10-CM

## 2022-11-30 DIAGNOSIS — I34.0 MITRAL VALVE INSUFFICIENCY, UNSPECIFIED ETIOLOGY: ICD-10-CM

## 2022-11-30 DIAGNOSIS — I35.8 AORTIC VALVE SCLEROSIS: ICD-10-CM

## 2022-11-30 DIAGNOSIS — R21 RASH: ICD-10-CM

## 2022-11-30 DIAGNOSIS — C54.1 ENDOMETRIAL CANCER (H): Primary | ICD-10-CM

## 2022-11-30 DIAGNOSIS — E78.5 HYPERLIPIDEMIA LDL GOAL <130: ICD-10-CM

## 2022-11-30 PROCEDURE — 99442 PR PHYSICIAN TELEPHONE EVALUATION 11-20 MIN: CPT | Mod: 95 | Performed by: PHYSICIAN ASSISTANT

## 2022-11-30 RX ORDER — ROSUVASTATIN CALCIUM 20 MG/1
20 TABLET, COATED ORAL DAILY
Qty: 90 TABLET | Refills: 3 | Status: SHIPPED | OUTPATIENT
Start: 2022-11-30 | End: 2023-12-27

## 2022-11-30 RX ORDER — TRIAMCINOLONE ACETONIDE 1 MG/G
CREAM TOPICAL 2 TIMES DAILY
Qty: 30 G | Refills: 3 | Status: SHIPPED | OUTPATIENT
Start: 2022-11-30 | End: 2023-12-27
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
## 2022-12-06 ENCOUNTER — OFFICE VISIT (OUTPATIENT)
Dept: SURGERY | Facility: CLINIC | Age: 72
End: 2022-12-06
Payer: COMMERCIAL

## 2022-12-06 VITALS
OXYGEN SATURATION: 97 % | RESPIRATION RATE: 16 BRPM | WEIGHT: 195 LBS | HEIGHT: 64 IN | SYSTOLIC BLOOD PRESSURE: 146 MMHG | HEART RATE: 68 BPM | BODY MASS INDEX: 33.29 KG/M2 | DIASTOLIC BLOOD PRESSURE: 78 MMHG

## 2022-12-06 DIAGNOSIS — K43.2 INCISIONAL HERNIA, WITHOUT OBSTRUCTION OR GANGRENE: Primary | ICD-10-CM

## 2022-12-06 PROCEDURE — 99214 OFFICE O/P EST MOD 30 MIN: CPT | Performed by: SURGERY

## 2022-12-06 NOTE — PROGRESS NOTES
The patient returns today to follow-up regarding her complex abdominal wall hernia.  She feels the lump has gotten a little bit bigger, but otherwise is not much bothered by it.  She immediately stated she is not interested in doing surgery at this time.  She did, however, want to discuss the issue further.    Past medical and surgical histories are reviewed.    Physical exam:  The patient is in no apparent distress.  Breathing is nonlabored.  The abdomen reveals an obvious protruding hernia.  This is reducible, with some difficulty.  The defect seems to be approximately 4 cm to palpation.  No additional obvious bulges are palpable.    Assessment and plan: This is a patient again with a complex hernia with multiple defects, a fairly large bulge and absence of the left rectus muscle.  There is certainly nothing making repair of this hernia urgent, but I did suggest that it certainly had some possibility of giving her more problems in the future.  My leaning would still be to proceed with a robotic intraperitoneal onlay type of repair.  The patient would like to consider her options.  I have asked her to return to see me when she is ready to consider surgical intervention.  At that point, I think it would make sense to repeat a CT scan, as it has been a couple of years since her last one.    A total of 30 minutes was spent today in chart review, patient examination and discussion, and documentation.    Kolby Cole MD  [facility]

## 2022-12-08 DIAGNOSIS — K43.2 INCISIONAL HERNIA, WITHOUT OBSTRUCTION OR GANGRENE: Primary | ICD-10-CM

## 2022-12-16 ENCOUNTER — ANCILLARY PROCEDURE (OUTPATIENT)
Dept: CT IMAGING | Facility: CLINIC | Age: 72
End: 2022-12-16
Attending: SURGERY
Payer: COMMERCIAL

## 2022-12-16 DIAGNOSIS — K43.2 INCISIONAL HERNIA, WITHOUT OBSTRUCTION OR GANGRENE: ICD-10-CM

## 2022-12-16 LAB
CREAT BLD-MCNC: 0.7 MG/DL (ref 0.5–1)
GFR SERPL CREATININE-BSD FRML MDRD: >60 ML/MIN/1.73M2

## 2022-12-16 PROCEDURE — 74177 CT ABD & PELVIS W/CONTRAST: CPT

## 2022-12-16 PROCEDURE — 82565 ASSAY OF CREATININE: CPT

## 2022-12-16 PROCEDURE — 255N000002 HC RX 255 OP 636: Performed by: SURGERY

## 2022-12-16 RX ADMIN — IOHEXOL 100 ML: 350 INJECTION, SOLUTION INTRAVENOUS at 09:55

## 2023-01-17 ENCOUNTER — OFFICE VISIT (OUTPATIENT)
Dept: SURGERY | Facility: CLINIC | Age: 73
End: 2023-01-17
Payer: COMMERCIAL

## 2023-01-17 VITALS
WEIGHT: 195 LBS | OXYGEN SATURATION: 94 % | SYSTOLIC BLOOD PRESSURE: 146 MMHG | DIASTOLIC BLOOD PRESSURE: 68 MMHG | HEIGHT: 64 IN | BODY MASS INDEX: 33.29 KG/M2 | HEART RATE: 74 BPM | RESPIRATION RATE: 16 BRPM

## 2023-01-17 DIAGNOSIS — K43.2 INCISIONAL HERNIA, WITHOUT OBSTRUCTION OR GANGRENE: Primary | ICD-10-CM

## 2023-01-17 PROCEDURE — 99215 OFFICE O/P EST HI 40 MIN: CPT | Performed by: SURGERY

## 2023-01-17 NOTE — LETTER
2023    RE: Blanquita Trevizo, : 1950      Patient returns today to follow-up after her recent CT scan.  She has not noticed any recent changes, but does feel that hernia is larger than it was 3 years ago.  The patient denies any pain other than some mild discomfort around Thanksgiving after eating, which resolved quickly.     Past medical and surgical histories are reviewed.     Physical exam: The patient again has a prominent upper abdominal bulge.  The defect itself is difficult to palpate, though the most superior aspect of the hernia is definitely reducible.  There appears to be an area just below this where there is a second bulge which is only partially reducible.  This is all nontender.     CT scan is reviewed with the patient and her daughter.  This reveals a slightly enlarged upper abdominal hernia containing colon.  There is also a fat-containing hernia, possibly with multiple defects.  Again noted is absence of the majority of the left rectus muscle.     Assessment and plan: We had a long discussion about the complexity of her case given the multiple defects, which measure approximately 5 cm in width, as well as the absence of the left rectus muscle and previous surgical history.  The patient thinks she will probably want to have this fixed at some time.  She had previously been told at the [facility] that she needed to lose 30 pounds prior to surgery.  She states that she has lost about 15 pounds since then, but would like to try to lose a little bit more weight.  I do think it would be advantageous if she could lose a bit more weight prior to surgery, though I would certainly be willing to do surgery at any time.  I have recommended robotic assisted repair of the patient's incisional hernia with mesh, likely an intraperitoneal onlay mesh (IPOM).  We discussed that there is some possibility that we would be unable to get the midline to close, in which case we would need to consider a  bridging mesh.  We also discussed the possibility that there could be too much scar tissue to successfully proceed with a robotic repair.  If that is the case, my leaning would be to abandon the procedure rather than to convert to an open, as I am not sure that a successful open repair is practical.  The patient is in agreement.  She will return to see me once she has reached her goal weight or once she wishes to schedule surgery.       Kolby Cole MD  [facility]

## 2023-02-08 ENCOUNTER — TELEPHONE (OUTPATIENT)
Dept: CARDIOLOGY | Facility: CLINIC | Age: 73
End: 2023-02-08

## 2023-02-08 NOTE — TELEPHONE ENCOUNTER
[facility] Measures Blood Pressure guideline reviewed.  Patients recent blood pressure is outside of guideline parameters.  Called pt to review, understands why we are calling and agrees to check their blood pressure with a home cuff or at a [place] pharmacy.  Patient instructed to then call [phone] (Lynn) and leave a message with their name, date of birth, and blood pressure reading that was completed within the last 24 hours and where it was completed.  Will await call back for further review.

## 2023-02-13 ENCOUNTER — OFFICE VISIT (OUTPATIENT)
Dept: FAMILY MEDICINE | Facility: CLINIC | Age: 73
End: 2023-02-13
Payer: COMMERCIAL

## 2023-02-13 VITALS
HEART RATE: 87 BPM | SYSTOLIC BLOOD PRESSURE: 158 MMHG | OXYGEN SATURATION: 95 % | DIASTOLIC BLOOD PRESSURE: 86 MMHG | BODY MASS INDEX: 33.13 KG/M2 | RESPIRATION RATE: 15 BRPM | WEIGHT: 193 LBS

## 2023-02-13 DIAGNOSIS — Z12.11 SCREEN FOR COLON CANCER: ICD-10-CM

## 2023-02-13 DIAGNOSIS — L92.0 GRANULOMA ANNULARE: ICD-10-CM

## 2023-02-13 DIAGNOSIS — I10 HYPERTENSION GOAL BP (BLOOD PRESSURE) < 130/80: Primary | ICD-10-CM

## 2023-02-13 LAB
ANION GAP SERPL CALCULATED.3IONS-SCNC: 14 MMOL/L (ref 7–15)
BUN SERPL-MCNC: 16.8 MG/DL (ref 8–23)
CALCIUM SERPL-MCNC: 10 MG/DL (ref 8.8–10.2)
CHLORIDE SERPL-SCNC: 102 MMOL/L (ref 98–107)
CREAT SERPL-MCNC: 0.55 MG/DL (ref 0.51–0.95)
CREAT UR-MCNC: 31.1 MG/DL
DEPRECATED HCO3 PLAS-SCNC: 25 MMOL/L (ref 22–29)
GFR SERPL CREATININE-BSD FRML MDRD: >90 ML/MIN/1.73M2
GLUCOSE SERPL-MCNC: 95 MG/DL (ref 70–99)
MICROALBUMIN UR-MCNC: <12 MG/L
MICROALBUMIN/CREAT UR: NORMAL MG/G{CREAT}
POTASSIUM SERPL-SCNC: 4.5 MMOL/L (ref 3.4–5.3)
SODIUM SERPL-SCNC: 141 MMOL/L (ref 136–145)
TSH SERPL DL<=0.005 MIU/L-ACNC: 1.06 UIU/ML (ref 0.3–4.2)

## 2023-02-13 PROCEDURE — 82570 ASSAY OF URINE CREATININE: CPT | Performed by: FAMILY MEDICINE

## 2023-02-13 PROCEDURE — 80048 BASIC METABOLIC PNL TOTAL CA: CPT | Performed by: FAMILY MEDICINE

## 2023-02-13 PROCEDURE — 99214 OFFICE O/P EST MOD 30 MIN: CPT | Performed by: FAMILY MEDICINE

## 2023-02-13 PROCEDURE — 84443 ASSAY THYROID STIM HORMONE: CPT | Performed by: FAMILY MEDICINE

## 2023-02-13 PROCEDURE — 36415 COLL VENOUS BLD VENIPUNCTURE: CPT | Performed by: FAMILY MEDICINE

## 2023-02-13 PROCEDURE — 82043 UR ALBUMIN QUANTITATIVE: CPT | Performed by: FAMILY MEDICINE

## 2023-02-13 RX ORDER — LOSARTAN POTASSIUM 25 MG/1
25 TABLET ORAL DAILY
Qty: 90 TABLET | Refills: 3 | Status: SHIPPED | OUTPATIENT
Start: 2023-02-13 | End: 2023-12-27

## 2023-02-13 RX ORDER — FLUOCINONIDE 0.5 MG/G
CREAM TOPICAL 2 TIMES DAILY
Qty: 15 G | Refills: 1 | Status: SHIPPED | OUTPATIENT
Start: 2023-02-13

## 2023-02-13 NOTE — PROGRESS NOTES
Assessment & Plan   Hypertension goal BP (blood pressure) < 130/80  Pressures elevated and will initiate losartan.  Lab check as well.  - TSH with free T4 reflex  - Basic metabolic panel  (Ca, Cl, CO2, Creat, Gluc, K, Na, BUN)  - Albumin Random Urine Quantitative with Creat Ratio  - losartan (COZAAR) 25 MG tablet  Dispense: 90 tablet; Refill: 3  - TSH with free T4 reflex  - Basic metabolic panel  (Ca, Cl, CO2, Creat, Gluc, K, Na, BUN)  - Albumin Random Urine Quantitative with Creat Ratio    Granuloma annulare  Patch on the dorsum of the hand most consistent with granuloma annulare and will treat with topical steroids  - fluocinonide (LIDEX) 0.05 % external cream  Dispense: 15 g; Refill: 1    Screen for colon cancer  Due for screening        Return in about 1 week (around 2/20/2023) for blood pressure recheck, with nurse.      Dane Morrison MD      [address] [address]  [email or web address]   Office: [phone]       Shanna Juares is a 72 year old, presenting for the following health issues:  Blood Pressure Check (High home readings) and Medication Question (Newer medication change: pt is now taking her Statin daily instead of a couple times per week for about the last month. )      History of Present Illness       Reason for visit:  High blood pressure measured on home device  Symptom onset:  1-3 days ago      Hypertension, new diagnosis      Do you check your blood pressure regularly outside of the clinic? Yes     Are you following a low salt diet? Yes    Are your blood pressures ever more than 140 on the top number (systolic) OR more   than 90 on the bottom number (diastolic), for example 140/90? Yes     BP Readings from Last 2 Encounters:   02/13/23 (!) 158/86   01/17/23 (!) 146/68     Hemoglobin A1C (%)   Date Value   08/01/2019 5.5   07/30/2018 5.6     LDL Cholesterol Calculated (mg/dL)   Date Value   09/15/2022 125 (H)   03/09/2022 147 (H)    09/11/2020 143 (H)   08/01/2019 129 (H)   She eats 2-3 servings of fruits and vegetables daily.She consumes 1 sweetened beverage(s) daily.She exercises with enough effort to increase her heart rate 10 to 19 minutes per day.  She exercises with enough effort to increase her heart rate 4 days per week. She is missing 2 dose(s) of medications per week.       Review of Systems         Objective    BP (!) 158/86 (BP Location: Left arm, Patient Position: Sitting, Cuff Size: Adult Large)   Pulse 87   Resp 15   Wt 87.5 kg (193 lb)   SpO2 95%   BMI 33.13 kg/m    Body mass index is 33.13 kg/m .  Physical Exam   GENERAL: healthy, alert and no distress  HENT: ear canals and TM's normal, nose and mouth without ulcers or lesions  NECK: no adenopathy, no asymmetry, masses, or scars and thyroid normal to palpation  RESP: lungs clear to auscultation - no rales, rhonchi or wheezes  CV: regular rate and rhythm, normal S1 S2, no S3 or S4, no murmur, click or rub, no peripheral edema and peripheral pulses strong  ABDOMEN: soft, nontender, no hepatosplenomegaly, no masses and bowel sounds normal  MS: no gross musculoskeletal defects noted, no edema  SKIN: no suspicious lesions or rashes  BACK: no CVA tenderness, no paralumbar tenderness  Results for orders placed or performed in visit on 02/13/23   TSH with free T4 reflex     Status: Normal   Result Value Ref Range    TSH 1.06 0.30 - 4.20 uIU/mL   Basic metabolic panel  (Ca, Cl, CO2, Creat, Gluc, K, Na, BUN)     Status: Normal   Result Value Ref Range    Sodium 141 136 - 145 mmol/L    Potassium 4.5 3.4 - 5.3 mmol/L    Chloride 102 98 - 107 mmol/L    Carbon Dioxide (CO2) 25 22 - 29 mmol/L    Anion Gap 14 7 - 15 mmol/L    Urea Nitrogen 16.8 8.0 - 23.0 mg/dL    Creatinine 0.55 0.51 - 0.95 mg/dL    Calcium 10.0 8.8 - 10.2 mg/dL    Glucose 95 70 - 99 mg/dL    GFR Estimate >90 >60 mL/min/1.73m2   Albumin Random Urine Quantitative with Creat Ratio     Status: None   Result Value Ref  Range    Creatinine Urine mg/dL 31.1 mg/dL    Albumin Urine mg/L <12.0 mg/L    Albumin Urine mg/g Cr

## 2023-02-13 NOTE — PATIENT INSTRUCTIONS
Eating Heart-Healthy Food: Using the DASH Plan    Eating for your heart doesn t have to be hard or boring. You just need to know how to make healthier choices. The DASH eating plan has been developed to help you do just that. DASH stands for Dietary Approaches to Stop Hypertension. It is a plan that has been proven to be healthier for your heart and to lower your risk for high blood pressure. It can also help lower your risk for cancer, heart disease, osteoporosis, and diabetes.  Choosing from each food group  Choose foods from each of the food groups below each day. Try to get the recommended number of servings for each food group. The serving numbers are based on a diet of 2,000 calories a day. Talk with your healthcare provider if you re not sure about your calorie needs. Along with getting the correct servings, the DASH plan also advises less than 2,300 mg of salt (sodium) per day. Lowering sodium intake to 1,500 mg per day lowers blood pressure even more. (There's about 2,300 mg of sodium in 1 teaspoon of salt.)      Grains  Servings: 6 to 8 a day  A serving is:  1 slice bread  1 ounce dry cereal  Half a cup cooked rice, pasta or cereal  Best choices: Whole grains and any grains high in fiber. Vegetables  Servings: 4 to 5 a day  A serving is:  1 cup raw leafy vegetable  Half a cup cut-up raw or cooked vegetable  Half a cup vegetable juice  Best choices: Fresh or frozen vegetables prepared without added salt or fat.   Fruits  Servings: 4 to 5 a day  A serving is:  1 medium fruit  One-quarter cup dried fruit  Half a cup fresh, frozen, or canned fruit  Half a cup of 100% fruit juices  Best choices: A variety of fresh fruits of different colors. Whole fruits are a better choice than fruit juices. Low-fat or fat-free dairy  Servings: 2 to 3 a day  A serving is:  1 cup milk  1 cup yogurt  One and a half ounces cheese  Best choices: Skim or 1% milk, low-fat or fat-free yogurt or buttermilk, and low-fat cheeses.          Lean meats, poultry, fish  Servings: 6 or fewer a day  A serving is:  1 ounce cooked meats, poultry, or fish  1 egg  Best choices: Lean poultry and fish. Trim away visible fat. Broil, grill, roast, or boil instead of frying. Remove skin from poultry before eating. Limit how much red meat you eat.  Nuts, seeds, beans  Servings: 4 to 5 a week  A serving is:  One-third cup nuts (one and a half ounces)  2 tablespoons nut butter or seeds  Half a cup cooked dry beans or legumes  Best choices: Dry roasted nuts with no salt added, lentils, kidney beans, garbanzo beans, and whole kwon beans.   Fats and oils  Servings: 2 to 3 a day  A serving is:  1 teaspoon vegetable oil  1 teaspoon soft margarine  1 tablespoon mayonnaise  2 tablespoons salad dressing  Best choices: Nut and vegetable oils (nontropical vegetable oils), such as olive and canola oil. Sweets  Servings: 5 a week or fewer  A serving is:  1 tablespoon sugar, maple syrup, or honey  1 tablespoon jam or jelly  1 half-ounce jelly beans (about 15)  1 cup lemonade  Best choices: Dried fruit can be a satisfying sweet. Choose low-fat sweets. And watch your serving sizes!      For more on the DASH eating plan, visit:  www.nhlbi.nih.gov/health/health-topics/topics/dash   Ilana last reviewed this educational content on 7/1/2019 2000-2020 [employer]. [address] [place] [address], [place] [address]. All rights reserved. This information is not intended as a substitute for professional medical care. Always follow your healthcare professional's instructions.

## 2023-02-15 NOTE — RESULT ENCOUNTER NOTE
Dear Blanquita,    Here is a summary of your recent test results:  -Kidney function is normal (Cr, GFR), Sodium is normal, Potassium is normal, Calcium is normal, Glucose is normal.   -TSH (thyroid stimulating hormone) level is normal which indicates normal thyroid function.  -Microalbumin (urine protein) test is normal.  ADVISE: rechecking this annually.    For additional lab test information, [email or web address] is a very good reference.    In addition, here is a list of due or overdue Health Maintenance reminders:  Meningitis A Vaccine(1 - Risk start 2-23 months series) Never done  Colorectal Cancer Screening due on 08/25/2021  Osteoporosis Screening due on 08/28/2022  Mammogram due on 02/18/2023    Please call us at [phone] (or use [email or web address]) to address the above recommendations if needed.           Thank you very much for trusting me and [facility].     Have a peaceful day.    Healthy regards,  Dane Morrison MD

## 2023-02-16 PROBLEM — I10 HYPERTENSION GOAL BP (BLOOD PRESSURE) < 130/80: Status: ACTIVE | Noted: 2023-02-16

## 2023-02-27 ENCOUNTER — ANCILLARY PROCEDURE (OUTPATIENT)
Dept: BONE DENSITY | Facility: CLINIC | Age: 73
End: 2023-02-27
Attending: PHYSICIAN ASSISTANT
Payer: COMMERCIAL

## 2023-02-27 ENCOUNTER — ALLIED HEALTH/NURSE VISIT (OUTPATIENT)
Dept: NURSING | Facility: CLINIC | Age: 73
End: 2023-02-27
Payer: COMMERCIAL

## 2023-02-27 ENCOUNTER — HOSPITAL ENCOUNTER (OUTPATIENT)
Dept: MAMMOGRAPHY | Facility: CLINIC | Age: 73
Discharge: HOME | End: 2023-02-27
Attending: PHYSICIAN ASSISTANT | Admitting: PHYSICIAN ASSISTANT
Payer: COMMERCIAL

## 2023-02-27 VITALS
BODY MASS INDEX: 32.68 KG/M2 | DIASTOLIC BLOOD PRESSURE: 60 MMHG | OXYGEN SATURATION: 96 % | WEIGHT: 190.4 LBS | SYSTOLIC BLOOD PRESSURE: 120 MMHG | HEART RATE: 78 BPM

## 2023-02-27 DIAGNOSIS — M85.80 OSTEOPENIA, UNSPECIFIED LOCATION: ICD-10-CM

## 2023-02-27 DIAGNOSIS — Z78.0 ASYMPTOMATIC MENOPAUSAL STATE: ICD-10-CM

## 2023-02-27 DIAGNOSIS — I10 HYPERTENSION GOAL BP (BLOOD PRESSURE) < 130/80: Primary | ICD-10-CM

## 2023-02-27 DIAGNOSIS — Z12.31 VISIT FOR SCREENING MAMMOGRAM: ICD-10-CM

## 2023-02-27 DIAGNOSIS — S32.010S COMPRESSION FRACTURE OF L1 VERTEBRA, SEQUELA: ICD-10-CM

## 2023-02-27 PROCEDURE — 77067 SCR MAMMO BI INCL CAD: CPT

## 2023-02-27 PROCEDURE — 77085 DXA BONE DENSITY AXL VRT FX: CPT | Performed by: INTERNAL MEDICINE

## 2023-02-27 PROCEDURE — 99207 PR NO CHARGE NURSE ONLY: CPT

## 2023-02-27 NOTE — PROGRESS NOTES
"Blanquita Trevizo is being followed for Blood Pressure management.      BP Readings from Last 3 Encounters:   02/27/23 120/60   02/13/23 (!) 158/86   01/17/23 (!) 146/68       Wt Readings from Last 3 Encounters:   02/27/23 86.4 kg (190 lb 6.4 oz)   02/13/23 87.5 kg (193 lb)   01/17/23 88.5 kg (195 lb)       Is pulse 55 or greater? - Yes    Pulse Readings from Last 3 Encounters:   02/27/23 78   02/13/23 87   01/17/23 74       Current blood pressure medication(s):  Current Outpatient Medications   Medication Sig Dispense Refill     Ascorbic Acid (VITAMIN C GUMMIE PO)        aspirin (ASA) 81 MG EC tablet Take 1 tablet (81 mg) by mouth daily       calcium carbonate-vitamin D (OS-MARI) 600-400 MG-UNIT chewable tablet Take 1 chew tab by mouth 2 times daily       fluocinonide (LIDEX) 0.05 % external cream Apply topically 2 times daily 15 g 1     losartan (COZAAR) 25 MG tablet Take 1 tablet (25 mg) by mouth daily 90 tablet 3     multivitamin w/minerals (THERA-VIT-M) tablet Take 1 tablet by mouth daily       naproxen sodium 220 MG capsule Take 220 mg by mouth daily as needed       Omega-3 Fatty Acids (FISH OIL ADULT GUMMIES) 113.5 MG CHEW        rosuvastatin (CRESTOR) 20 MG tablet Take 1 tablet (20 mg) by mouth daily For prevention of cardiovascular disease 90 tablet 3     triamcinolone (KENALOG) 0.1 % external cream Apply topically 2 times daily 30 g 3     Vitamin D, Cholecalciferol, 1000 units TABS Take 1-2 tablets (1,000-2,000 Units) by mouth daily            1. Follow up instructions include:     Per PCP.      SUBJECTIVE:                                                    The patient is taking medication as prescribed and is tolerating well.   Patient is monitoring Blood Pressure at home.   Last 3 home readings 149/??, 143/??.      LOV: 2/13/2 - started on losartan 25 mg daily    Last took yesterday evening. States she \"takes it when she remembers\", sometimes in the morning, sometimes in the evening. Takes BP about once " "day.     Included instructions on AVS:   \"Try and take blood pressure medication at the same time every day.     Check BP daily. At least 2 hours after taking BP medication. If over 140/90, rest and recheck in 10-15 min. If persistently elevated for 3 or more days (over 140/90) call the clinic.     If feeling lightheaded, sit down and check BP. Then give the clinic a call. [phone].     ZACKERY Valdovinos\"    Out of the following complicating factors: Cough, Headache, Lightheadedness, Shortness of breath, Fatigue, Nausea, Sexual Dysfunction, New onset of swelling or edema, Weakness and New onset of Chest Pain, the patient reports:  Patient noted some lightheadedness, but when questioned further, she stated it hasn't been a problem. Advised to monitor symptoms for now and call back if persistent. Patient stated an understanding and agreed with plan.     OBJECTIVE:                                                      Today's BP completed using cuff size: regular on right side arm.      Potassium   Date Value Ref Range Status   02/13/2023 4.5 3.4 - 5.3 mmol/L Final   09/15/2022 4.3 3.4 - 5.3 mmol/L Final   09/11/2020 4.3 3.4 - 5.3 mmol/L Final     Creatinine   Date Value Ref Range Status   02/13/2023 0.55 0.51 - 0.95 mg/dL Final   09/11/2020 0.67 0.52 - 1.04 mg/dL Final     Urea Nitrogen   Date Value Ref Range Status   02/13/2023 16.8 8.0 - 23.0 mg/dL Final   09/15/2022 13 7 - 30 mg/dL Final   09/11/2020 17 7 - 30 mg/dL Final     GFR Estimate   Date Value Ref Range Status   02/13/2023 >90 >60 mL/min/1.73m2 Final     Comment:     eGFR calculated using 2021 CKD-EPI equation.   09/11/2020 88 >60 mL/min/[1.73_m2] Final     Comment:     Non  GFR Calc  Starting 12/18/2018, serum creatinine based estimated GFR (eGFR) will be   calculated using the Chronic Kidney Disease Epidemiology Collaboration   (CKD-EPI) equation.       GFR, ESTIMATED POCT   Date Value Ref Range Status   12/16/2022 >60 >60 mL/min/1.73m2 Final "         Education:  general discussion/verbal explanation  Ways to help improve BP/HTN:   Medications  Lose weight  Diet low in fat and rich in fruits, vegetables and low fat dairy products  Reduce salt in diet  Do something active for at least 30 minutes a day on most days of the week  Cut down on alcohol (if you drink more than 2 drinks per day)  Decrease stress (exercise, read, yoga, meditation, time for self, etc.)   Patient was given an opportunity to ask questions.    Patient verbalized understanding of this plan and is agreeable.    LEOBARDO HOLLEY RN

## 2023-02-27 NOTE — PATIENT INSTRUCTIONS
Try and take blood pressure medication at the same time every day.     Check BP daily. At least 2 hours after taking BP medication. If over 140/90, rest and recheck in 10-15 min. If persistently elevated for 3 or more days (over 140/90) call the clinic.     If feeling lightheaded, sit down and check BP. Then give the clinic a call. [phone].     ZACKERY Valdovinos

## 2023-02-28 ENCOUNTER — TELEPHONE (OUTPATIENT)
Dept: FAMILY MEDICINE | Facility: CLINIC | Age: 73
End: 2023-02-28
Payer: COMMERCIAL

## 2023-02-28 NOTE — TELEPHONE ENCOUNTER
Please call [facility] to obtain results of recent colonoscopy and any lab pathology that was sent during colonoscopy (I ordered this study so I should not need a release of information).  Per the patient's MyChart messages this was completed in December 2022.      Nasra Herron MBA, MS, PA-C  [facility]

## 2023-02-28 NOTE — RESULT ENCOUNTER NOTE
Blanquita  I have reviewed your recent labs. Here are the results:    -Mammogram was normal.  ADVISE: rechecking in 1 year.     If you have any questions please do not hesitate to contact our office via phone ([phone]) or [employer].    Healthy regards,     Nasra Herron MBA, MS, PA-C  [facility]

## 2023-02-28 NOTE — RESULT ENCOUNTER NOTE
Blanquita-    Here are your recent bone density results.  They are a possible trend towards worsening from your previous studies.  Your bone density is consistent with osteopenia (thinning of bone, but not as severe as osteoporosis). Taking 1200 mg-1500 mg of calcium +D (Caltrate D or generic equivalent) twice daily can help to prevent progression of bone thinning in addition to weight bearing exercise at least 3 times per week.  We should plan to repeat your bone density scan in 2 years.      Thank you for allowing me to be a part of your healthcare team.    Nasra Herron, MS, PA-C  [facility]

## 2023-03-02 NOTE — TELEPHONE ENCOUNTER
Had to leave a message at medical records requesting records for Blanquita.     Gave all information and asked them to be faxed to Nasra at the clinic      Marii Hernandez

## 2023-03-16 ENCOUNTER — TELEPHONE (OUTPATIENT)
Dept: SURGERY | Facility: CLINIC | Age: 73
End: 2023-03-16

## 2023-03-16 ENCOUNTER — OFFICE VISIT (OUTPATIENT)
Dept: SURGERY | Facility: CLINIC | Age: 73
End: 2023-03-16
Payer: COMMERCIAL

## 2023-03-16 ENCOUNTER — PREP FOR PROCEDURE (OUTPATIENT)
Dept: SURGERY | Facility: CLINIC | Age: 73
End: 2023-03-16

## 2023-03-16 VITALS
HEIGHT: 64 IN | BODY MASS INDEX: 32.44 KG/M2 | HEART RATE: 64 BPM | WEIGHT: 190 LBS | OXYGEN SATURATION: 96 % | SYSTOLIC BLOOD PRESSURE: 122 MMHG | DIASTOLIC BLOOD PRESSURE: 78 MMHG | RESPIRATION RATE: 16 BRPM

## 2023-03-16 DIAGNOSIS — K43.2 INCISIONAL HERNIA, WITHOUT OBSTRUCTION OR GANGRENE: Primary | ICD-10-CM

## 2023-03-16 PROCEDURE — 99214 OFFICE O/P EST MOD 30 MIN: CPT | Performed by: SURGERY

## 2023-03-16 NOTE — LETTER
[facility]    [address], [address]  [place], [place] [address]  Phone [phone]  Fax [phone] [address], [address]  [facility] [place] [address]  Phone [phone]  Fax [phone]    [email or web address]   Showering Before Surgery      Your surgeon has asked you to take 2 showers before surgery.    Why is this important?  It is normal for bacteria (germs) to be on your skin. The skin protects us from these germs. When you have surgery, we cut the skin. Sometimes germs get into the cuts and cause infection (illness caused by germs). By following the instructions below and using special soap, you will lower the number of germs on your skin. This decreases your chance of infection.  Special soap  Buy or get 8 ounces of antiseptic surgical soap called 4% CHG. Common name brands of this soap are Hibiclens and Exidine.  You can find it at your local pharmacy, clinic or retail store. If you have trouble, ask your pharmacist to help you find the right substitute.  A note about shaving:  Do not shave within 12 inches of your incision (surgical cut) area for at least 3 days before surgery. Shaving can make small cuts in the skin. This puts you at a higher risk of infection.  Items you will need for each shower:   1 newly washed towel   4 ounces of one of the above soaps   Clean pajamas or clothes to change into    Follow these instructions:  Follow these steps the evening before surgery and the morning of surgery.  1. Wash your hair and body with your regular shampoo and soap. Make sure you rinse the shampoo and soap from your hair and body.  2. Using clean hands, apply about 2 ounces of soap gently on your skin from your ear lobes to your toes. Use on your groin area last. Do not use this soap on your face or head. If you get any soap in your eyes, ears or mouth, rinse right away.  3. Repeat step 2. It is very important to let the  soap stay on your skin for at least 1 minute.    4. Rinse well and dry off using a clean towel.    If you feel any tingling, itching or other irritation, rinse right away. It is normal to feel some coolness on the skin after using the antiseptic soap. Your skin may feel a bit dry after the shower, but do not use any lotions, creams or moisturizers. Do not use hair spray or other products in your hair.  5.  Dress in freshly washed clothes or pajamas. Use fresh pillowcases and sheets on your bed.    Repeat these steps the morning of surgery.  If you have any questions about showering or an allergy to CHG soap, please call your surgery center.    For informational purposes only. Not to replace the advice of your health care provider. Copyright   2012 [employer]. All rights reserved. Clinically reviewed by Infection Prevention and Practice and Education. [employer] [id number] - REV 12

## 2023-03-16 NOTE — LETTER
2023       Re: Blanquita Trevizo - 1950    The patient returns today to follow-up regarding her complex abdominal hernia.  She continues to have intermittent discomfort.  She thinks she is nearing the time when she would like to have this fixed.     Past medical and surgical histories are reviewed.     Exam reveals a partially reducible hernia in the upper abdomen.  Specific defects are not palpable.     We reviewed the patient's CT scan.  This reveals the largest defect, which is palpable and contains a large amount of colon.  Below that, there appear to be additional defects and below that is a small umbilical hernia.     I have again recommended robotic assisted repair and we discussed the procedure, along with its risks and complications, in detail.  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  I would anticipate an overnight stay. We will work on getting surgery arranged for her in the near future.     A total of 30 minutes was spent today in chart review, patient examination and discussion, and documentation.     Kolby Cole MD  [facility]

## 2023-03-16 NOTE — LETTER
[facility]    [address], [address]  [place], [place] [address]  Phone [phone]  Fax [phone]    [address], [address]  [place] [address]  Phone [phone]  Fax [phone]    [email or web address]   2023       Blanquita Trevizo    RE: [id number]  : 1950    Blanquita Trevizo has been scheduled for surgery on 23 at 7:50 am at [facility] with Dr Kolby Cole.  The hospital is located at [address] in [place].      Please check in at the Surgery reception desk at 5:50 am. This is located in the back of the hospital on the East side, just past the Emergency Room entrance.       DO NOT EAT OR DRINK ANYTHING 8 HOURS BEFORE YOUR ARRIVAL TIME.  You may have sips of clear liquids up until 2 hours before your arrival time. If you have been advised to take your medication, please do this early in the morning with just sips of clear liquid.       Hospital regulations require an updated pre-operative examination to be completed within 30 days of the procedure. This can be done by your primary care provider. Please ask them to fax documentation to [phone]. We also recommend you bring a copy with you.       You should shower before your surgery with Hibiclens or Exidine soap.  This can be found at your local pharmacy or you can pick it up from our office for free.  Please call our office if you have any questions.       You will receive several calls from our staff 3-7 days prior to your scheduled procedure with further details and to answer any questions you may have.      It is sometimes necessary to adjust the surgery schedule due to emergencies and additions to the schedule.  If your surgery is affected by this, we greatly appreciate your flexibility and understanding in this matter      It is best if you call regarding post-operative questions between the hours of 8:00 am  & 3:00 pm Monday-Friday, so you have access to the daytime care team that know you best.  ? Prescription refills are accepted during regular office hours only.      Please do not bring any Disability or FMLA papers to the hospital.  They need to be either faxed ([phone]), mailed or hand delivered to our office by you or a family member for completion.  ? Please allow 14 business days to complete paperwork.        If you have questions or concerns, please contact our office at [phone].

## 2023-03-16 NOTE — PROGRESS NOTES
The patient returns today to follow-up regarding her complex abdominal hernia.  She continues to have intermittent discomfort.  She thinks she is nearing the time when she would like to have this fixed.    Past medical and surgical histories are reviewed.    Exam reveals a partially reducible hernia in the upper abdomen.  Specific defects are not palpable.    We reviewed the patient's CT scan.  This reveals the largest defect, which is palpable and contains a large amount of colon.  Below that, there appear to be additional defects and below that is a small umbilical hernia.    I have again recommended robotic assisted repair and we discussed the procedure, along with its risks and complications, in detail.  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  I would anticipate an overnight stay.  We will work on getting surgery arranged for her in the near future.    A total of 30 minutes was spent today in chart review, patient examination and discussion, and documentation.    Kolby Cole MD  [facility]    Please route or send letter to:  Primary Care Provider (PCP)

## 2023-03-16 NOTE — TELEPHONE ENCOUNTER
Type of surgery: ROBOTIC ASSISTED INCISIONAL HERNIA REPAIR WITH MESH   Location of surgery: [facility]  Date and time of surgery: 6-12-23, 7:50 AM  Surgeon: DR TINOCO   Pre-Op Appt Date: PATIENT TO SCHEDULE   Post-Op Appt Date: NA   Packet sent out: GIVEN TO PATIENT   Pre-cert/Authorization completed:  Not Applicable  Date: 3-16-23 UPDATED 3/27/2023 NMS         *outpatient overnight*  ROBOTIC ASSISTED INCISIONAL HERNIA REPAIR WITH MESH   GENERAL   PT INST TO HAVE H&P   3 HRS REQ  PA ASSIST [facility]  ALW

## 2023-05-31 ENCOUNTER — OFFICE VISIT (OUTPATIENT)
Dept: FAMILY MEDICINE | Facility: CLINIC | Age: 73
End: 2023-05-31
Payer: COMMERCIAL

## 2023-05-31 VITALS
HEART RATE: 77 BPM | WEIGHT: 192.4 LBS | RESPIRATION RATE: 16 BRPM | DIASTOLIC BLOOD PRESSURE: 64 MMHG | OXYGEN SATURATION: 95 % | TEMPERATURE: 98.6 F | BODY MASS INDEX: 32.85 KG/M2 | SYSTOLIC BLOOD PRESSURE: 126 MMHG | HEIGHT: 64 IN

## 2023-05-31 DIAGNOSIS — Z23 NEED FOR MENINGITIS VACCINATION: ICD-10-CM

## 2023-05-31 DIAGNOSIS — E78.5 HYPERLIPIDEMIA LDL GOAL <130: ICD-10-CM

## 2023-05-31 DIAGNOSIS — Z01.818 PREOP GENERAL PHYSICAL EXAM: Primary | ICD-10-CM

## 2023-05-31 DIAGNOSIS — E66.811 OBESITY, CLASS I, BMI 30.0-34.9 (SEE ACTUAL BMI): ICD-10-CM

## 2023-05-31 DIAGNOSIS — I10 HYPERTENSION GOAL BP (BLOOD PRESSURE) < 130/80: ICD-10-CM

## 2023-05-31 DIAGNOSIS — K43.2 INCISIONAL HERNIA, WITHOUT OBSTRUCTION OR GANGRENE: ICD-10-CM

## 2023-05-31 DIAGNOSIS — Z90.81 S/P SPLENECTOMY: ICD-10-CM

## 2023-05-31 LAB
ERYTHROCYTE [DISTWIDTH] IN BLOOD BY AUTOMATED COUNT: 14.8 % (ref 10–15)
HCT VFR BLD AUTO: 38.3 % (ref 35–47)
HGB BLD-MCNC: 12.2 G/DL (ref 11.7–15.7)
MCH RBC QN AUTO: 29.8 PG (ref 26.5–33)
MCHC RBC AUTO-ENTMCNC: 31.9 G/DL (ref 31.5–36.5)
MCV RBC AUTO: 93 FL (ref 78–100)
PLATELET # BLD AUTO: 298 10E3/UL (ref 150–450)
RBC # BLD AUTO: 4.1 10E6/UL (ref 3.8–5.2)
WBC # BLD AUTO: 6.5 10E3/UL (ref 4–11)

## 2023-05-31 PROCEDURE — 85027 COMPLETE CBC AUTOMATED: CPT | Performed by: PHYSICIAN ASSISTANT

## 2023-05-31 PROCEDURE — 80048 BASIC METABOLIC PNL TOTAL CA: CPT | Performed by: PHYSICIAN ASSISTANT

## 2023-05-31 PROCEDURE — 36415 COLL VENOUS BLD VENIPUNCTURE: CPT | Performed by: PHYSICIAN ASSISTANT

## 2023-05-31 PROCEDURE — 93000 ELECTROCARDIOGRAM COMPLETE: CPT | Performed by: PHYSICIAN ASSISTANT

## 2023-05-31 PROCEDURE — 99214 OFFICE O/P EST MOD 30 MIN: CPT | Performed by: PHYSICIAN ASSISTANT

## 2023-05-31 NOTE — PATIENT INSTRUCTIONS
For informational purposes only. Not to replace the advice of your health care provider. Copyright   2003,  [employer] [facility]. All rights reserved. Clinically reviewed by Annita Rojas MD. listedplaces [id number] - REV .  Preparing for Your Surgery  Getting started  A nurse will call you to review your health history and instructions. They will give you an arrival time based on your scheduled surgery time. Please be ready to share:  Your doctor's clinic name and phone number  Your medical, surgical, and anesthesia history  A list of allergies and sensitivities  A list of medicines, including herbal treatments and over-the-counter drugs  Whether the patient has a legal guardian (ask how to send us the papers in advance)  Please tell us if you're pregnant--or if there's any chance you might be pregnant. Some surgeries may injure a fetus (unborn baby), so they require a pregnancy test. Surgeries that are safe for a fetus don't always need a test, and you can choose whether to have one.   If you have a child who's having surgery, please ask for a copy of Preparing for Your Child's Surgery.    Preparing for surgery  Within 10 to 30 days of surgery: Have a pre-op exam (sometimes called an H&P, or History and Physical). This can be done at a clinic or pre-operative center.  If you're having a , you may not need this exam. Talk to your care team.  At your pre-op exam, talk to your care team about all medicines you take. If you need to stop any medicines before surgery, ask when to start taking them again.  We do this for your safety. Many medicines can make you bleed too much during surgery. Some change how well surgery (anesthesia) drugs work.  Call your insurance company to let them know you're having surgery. (If you don't have insurance, call [phone].)  Call your clinic if there's any change in your health. This includes signs of a cold or flu (sore throat, runny nose, cough, rash, fever). It  also includes a scrape or scratch near the surgery site.  If you have questions on the day of surgery, call your hospital or surgery center.  Eating and drinking guidelines  For your safety: Unless your surgeon tells you otherwise, follow the guidelines below.  Eat and drink as usual until 8 hours before you arrive for surgery. After that, no food or milk.  Drink clear liquids until 2 hours before you arrive. These are liquids you can see through, like water, Gatorade, and Propel Water. They also include plain black coffee and tea (no cream or milk), candy, and breath mints. You can spit out gum when you arrive.  If you drink alcohol: Stop drinking it the night before surgery.  If your care team tells you to take medicine on the morning of surgery, it's okay to take it with a sip of water.  Preventing infection  Shower or bathe the night before and morning of your surgery. Follow the instructions your clinic gave you. (If no instructions, use regular soap.)  Don't shave or clip hair near your surgery site. We'll remove the hair if needed.  Don't smoke or vape the morning of surgery. You may chew nicotine gum up to 2 hours before surgery. A nicotine patch is okay.  Note: Some surgeries require you to completely quit smoking and nicotine. Check with your surgeon.  Your care team will make every effort to keep you safe from infection. We will:  Clean our hands often with soap and water (or an alcohol-based hand rub).  Clean the skin at your surgery site with a special soap that kills germs.  Give you a special gown to keep you warm. (Cold raises the risk of infection.)  Wear special hair covers, masks, gowns and gloves during surgery.  Give antibiotic medicine, if prescribed. Not all surgeries need antibiotics.  What to bring on the day of surgery  Photo ID and insurance card  Copy of your health care directive, if you have one  Glasses and hearing aids (bring cases)  You can't wear contacts during surgery  Inhaler and  eye drops, if you use them (tell us about these when you arrive)  CPAP machine or breathing device, if you use them  A few personal items, if spending the night  If you have . . .  A pacemaker, ICD (cardiac defibrillator) or other implant: Bring the ID card.  An implanted stimulator: Bring the remote control.  A legal guardian: Bring a copy of the certified (court-stamped) guardianship papers.  Please remove any jewelry, including body piercings. Leave jewelry and other valuables at home.  If you're going home the day of surgery  You must have a responsible adult drive you home. They should stay with you overnight as well.  If you don't have someone to stay with you, and you aren't safe to go home alone, we may keep you overnight. Insurance often won't pay for this.  After surgery  If it's hard to control your pain or you need more pain medicine, please call your surgeon's office.  Questions?   If you have any questions for your care team, list them here: _________________________________________________________________________________________________________________________________________________________________________ ____________________________________ ____________________________________ ____________________________________    How to Take Your Medication Before Surgery  Morning of procedure: Hold all medications    For 7 days prior to procedure: Hold all vitamins/supplements.  Hold all NSAID medications (ibuprofen/motrin/aleve) and aspirin.  Tylenol products OK.

## 2023-05-31 NOTE — RESULT ENCOUNTER NOTE
Results discussed directly with patient while patient was present. Any further details documented in the note.   Nasra Herron PA-C
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
## 2023-06-01 LAB
ANION GAP SERPL CALCULATED.3IONS-SCNC: 11 MMOL/L (ref 7–15)
BUN SERPL-MCNC: 19.6 MG/DL (ref 8–23)
CALCIUM SERPL-MCNC: 9.4 MG/DL (ref 8.8–10.2)
CHLORIDE SERPL-SCNC: 107 MMOL/L (ref 98–107)
CREAT SERPL-MCNC: 0.62 MG/DL (ref 0.51–0.95)
DEPRECATED HCO3 PLAS-SCNC: 23 MMOL/L (ref 22–29)
GFR SERPL CREATININE-BSD FRML MDRD: >90 ML/MIN/1.73M2
GLUCOSE SERPL-MCNC: 85 MG/DL (ref 70–99)
POTASSIUM SERPL-SCNC: 4.4 MMOL/L (ref 3.4–5.3)
SODIUM SERPL-SCNC: 141 MMOL/L (ref 136–145)

## 2023-06-01 NOTE — RESULT ENCOUNTER NOTE
Blanquita  I have reviewed your recent test results:    -Normal red blood cell (hgb) levels, normal white blood cell count and normal platelet levels.  -Kidney function is normal (Cr, GFR), Sodium is normal, Potassium is normal, Calcium is normal, Glucose is normal.     Good luck with your procedure!    For additional lab test information, [email or web address] is an excellent reference.     If you have any questions please do not hesitate to contact our office via phone ([phone]) or [facility].    Healthy regards,     Nasra Herron MBA, MS, PA-C  [facility]

## 2023-06-06 RX ORDER — CYANOCOBALAMIN (VITAMIN B-12) 500 MCG
400 LOZENGE ORAL DAILY
COMMUNITY

## 2023-06-08 NOTE — PHARMACY-ADMISSION MEDICATION HISTORY
Medication history and patient interview completed by pre-admitting RN or pre-op/PACU RN. Reviewed [facility] pharmacist, [facility], [facility] Everywhere, and chart review.       Amadou Greer, Pharm.D., BCPS      Status Changed by Time of change   Nurse Sita Barros, RN Tue Jun 6, 2023  9:45 AM       Prior to Admission medications    Medication Sig Last Dose Taking? Auth Provider Long Term End Date   APPLE CIDER VINEGAR PO Take 2 Units by mouth daily 6/2/2023 Yes Reported, Patient     aspirin (ASA) 81 MG EC tablet Take 1 tablet (81 mg) by mouth daily 6/2/2023 Yes Nasra Herron PA-C     Bioflavonoid Products (SUPER C-500 COMPLEX PO) Take 1 tablet by mouth daily 6/2/2023 Yes Reported, Patient     calcium carbonate-vitamin D (OS-MARI) 600-400 MG-UNIT chewable tablet Take 1 chew tab by mouth 2 times daily Past Month Yes Narsa Herron PA-C     fluocinonide (LIDEX) 0.05 % external cream Apply topically 2 times daily Past Month Yes Abdiaziz Morrison MD     losartan (COZAAR) 25 MG tablet Take 1 tablet (25 mg) by mouth daily  Yes Abdiaziz Morrison MD Yes    multivitamin w/minerals (THERA-VIT-M) tablet Take 1 tablet by mouth daily  Yes Reported, Patient     naproxen sodium (ANAPROX) 220 MG tablet Take 220 mg by mouth daily as needed  Yes Reported, Patient     Omega-3 Fatty Acids (FISH OIL ADULT GUMMIES) 113.5 MG CHEW Take by mouth daily Past Month Yes Nasra Herron PA-C     rosuvastatin (CRESTOR) 20 MG tablet Take 1 tablet (20 mg) by mouth daily For prevention of cardiovascular disease  Yes Nasra Herron PA-C Yes    triamcinolone (KENALOG) 0.1 % external cream Apply topically 2 times daily  Yes Nasra Herron PA-C     Vitamin D, Cholecalciferol, 1000 units TABS Take 1-2 tablets (1,000-2,000 Units) by mouth daily Past Week Yes Nasra Herron PA-C     vitamin E 400 units TABS Take 400 Units by mouth daily With magnesium 6/2/2023 Yes Reported, Patient

## 2023-06-12 ENCOUNTER — APPOINTMENT (OUTPATIENT)
Dept: SURGERY | Facility: PHYSICIAN GROUP | Age: 73
End: 2023-06-12
Payer: COMMERCIAL

## 2023-06-12 ENCOUNTER — ANESTHESIA (OUTPATIENT)
Dept: SURGERY | Facility: CLINIC | Age: 73
End: 2023-06-12
Payer: COMMERCIAL

## 2023-06-12 ENCOUNTER — HOSPITAL ENCOUNTER (OUTPATIENT)
Facility: CLINIC | Age: 73
Discharge: HOME | End: 2023-06-12
Attending: SURGERY | Admitting: SURGERY
Payer: COMMERCIAL

## 2023-06-12 ENCOUNTER — ANESTHESIA EVENT (OUTPATIENT)
Dept: SURGERY | Facility: CLINIC | Age: 73
End: 2023-06-12
Payer: COMMERCIAL

## 2023-06-12 VITALS
DIASTOLIC BLOOD PRESSURE: 80 MMHG | RESPIRATION RATE: 16 BRPM | BODY MASS INDEX: 32.69 KG/M2 | HEIGHT: 64 IN | SYSTOLIC BLOOD PRESSURE: 151 MMHG | OXYGEN SATURATION: 90 % | HEART RATE: 57 BPM | WEIGHT: 191.5 LBS | TEMPERATURE: 97.1 F

## 2023-06-12 DIAGNOSIS — K43.2 INCISIONAL HERNIA, WITHOUT OBSTRUCTION OR GANGRENE: ICD-10-CM

## 2023-06-12 PROCEDURE — 250N000013 HC RX MED GY IP 250 OP 250 PS 637: Performed by: ANESTHESIOLOGY

## 2023-06-12 PROCEDURE — C1781 MESH (IMPLANTABLE): HCPCS | Performed by: SURGERY

## 2023-06-12 PROCEDURE — 258N000003 HC RX IP 258 OP 636: Performed by: ANESTHESIOLOGY

## 2023-06-12 PROCEDURE — 250N000011 HC RX IP 250 OP 636: Performed by: SURGERY

## 2023-06-12 PROCEDURE — 250N000009 HC RX 250: Performed by: NURSE ANESTHETIST, CERTIFIED REGISTERED

## 2023-06-12 PROCEDURE — 360N000080 HC SURGERY LEVEL 7, PER MIN: Performed by: SURGERY

## 2023-06-12 PROCEDURE — 250N000025 HC SEVOFLURANE, PER MIN: Performed by: SURGERY

## 2023-06-12 PROCEDURE — 272N000001 HC OR GENERAL SUPPLY STERILE: Performed by: SURGERY

## 2023-06-12 PROCEDURE — 999N000141 HC STATISTIC PRE-PROCEDURE NURSING ASSESSMENT: Performed by: SURGERY

## 2023-06-12 PROCEDURE — 258N000003 HC RX IP 258 OP 636: Performed by: NURSE ANESTHETIST, CERTIFIED REGISTERED

## 2023-06-12 PROCEDURE — 250N000009 HC RX 250: Performed by: SURGERY

## 2023-06-12 PROCEDURE — 710N000009 HC RECOVERY PHASE 1, LEVEL 1, PER MIN: Performed by: SURGERY

## 2023-06-12 PROCEDURE — 49595 RPR AA HRN 1ST > 10 RDC: CPT | Mod: AS | Performed by: PHYSICIAN ASSISTANT

## 2023-06-12 PROCEDURE — 370N000017 HC ANESTHESIA TECHNICAL FEE, PER MIN: Performed by: SURGERY

## 2023-06-12 PROCEDURE — 49595 RPR AA HRN 1ST > 10 RDC: CPT | Performed by: SURGERY

## 2023-06-12 PROCEDURE — 710N000012 HC RECOVERY PHASE 2, PER MINUTE: Performed by: SURGERY

## 2023-06-12 PROCEDURE — 250N000011 HC RX IP 250 OP 636: Performed by: NURSE ANESTHETIST, CERTIFIED REGISTERED

## 2023-06-12 DEVICE — IMPLANTABLE DEVICE: Type: IMPLANTABLE DEVICE | Site: ABDOMEN | Status: FUNCTIONAL

## 2023-06-12 RX ORDER — PROCHLORPERAZINE MALEATE 5 MG
5 TABLET ORAL EVERY 6 HOURS PRN
Status: CANCELLED | OUTPATIENT
Start: 2023-06-12

## 2023-06-12 RX ORDER — HYDRALAZINE HYDROCHLORIDE 20 MG/ML
2.5-5 INJECTION INTRAMUSCULAR; INTRAVENOUS EVERY 10 MIN PRN
Status: DISCONTINUED | OUTPATIENT
Start: 2023-06-12 | End: 2023-06-12 | Stop reason: HOSPADM

## 2023-06-12 RX ORDER — OXYCODONE HYDROCHLORIDE 5 MG/1
5 TABLET ORAL EVERY 4 HOURS PRN
Status: DISCONTINUED | OUTPATIENT
Start: 2023-06-12 | End: 2023-06-12 | Stop reason: HOSPADM

## 2023-06-12 RX ORDER — ONDANSETRON 2 MG/ML
4 INJECTION INTRAMUSCULAR; INTRAVENOUS EVERY 6 HOURS PRN
Status: CANCELLED | OUTPATIENT
Start: 2023-06-12

## 2023-06-12 RX ORDER — OXYCODONE HYDROCHLORIDE 5 MG/1
10 TABLET ORAL EVERY 4 HOURS PRN
Status: DISCONTINUED | OUTPATIENT
Start: 2023-06-12 | End: 2023-06-12 | Stop reason: HOSPADM

## 2023-06-12 RX ORDER — FENTANYL CITRATE 50 UG/ML
50 INJECTION, SOLUTION INTRAMUSCULAR; INTRAVENOUS EVERY 5 MIN PRN
Status: DISCONTINUED | OUTPATIENT
Start: 2023-06-12 | End: 2023-06-12 | Stop reason: HOSPADM

## 2023-06-12 RX ORDER — LIDOCAINE 40 MG/G
CREAM TOPICAL
Status: DISCONTINUED | OUTPATIENT
Start: 2023-06-12 | End: 2023-06-12 | Stop reason: HOSPADM

## 2023-06-12 RX ORDER — KETOROLAC TROMETHAMINE 15 MG/ML
15 INJECTION, SOLUTION INTRAMUSCULAR; INTRAVENOUS ONCE
Status: CANCELLED | OUTPATIENT
Start: 2023-06-12 | End: 2023-06-12

## 2023-06-12 RX ORDER — SODIUM CHLORIDE, SODIUM LACTATE, POTASSIUM CHLORIDE, CALCIUM CHLORIDE 600; 310; 30; 20 MG/100ML; MG/100ML; MG/100ML; MG/100ML
INJECTION, SOLUTION INTRAVENOUS CONTINUOUS
Status: DISCONTINUED | OUTPATIENT
Start: 2023-06-12 | End: 2023-06-12 | Stop reason: HOSPADM

## 2023-06-12 RX ORDER — LABETALOL HYDROCHLORIDE 5 MG/ML
10 INJECTION, SOLUTION INTRAVENOUS
Status: DISCONTINUED | OUTPATIENT
Start: 2023-06-12 | End: 2023-06-12 | Stop reason: HOSPADM

## 2023-06-12 RX ORDER — ONDANSETRON 4 MG/1
4 TABLET, ORALLY DISINTEGRATING ORAL EVERY 6 HOURS PRN
Status: CANCELLED | OUTPATIENT
Start: 2023-06-12

## 2023-06-12 RX ORDER — DEXTROSE MONOHYDRATE, SODIUM CHLORIDE, AND POTASSIUM CHLORIDE 50; 1.49; 4.5 G/1000ML; G/1000ML; G/1000ML
INJECTION, SOLUTION INTRAVENOUS CONTINUOUS
Status: CANCELLED | OUTPATIENT
Start: 2023-06-12

## 2023-06-12 RX ORDER — LIDOCAINE HYDROCHLORIDE 20 MG/ML
INJECTION, SOLUTION INFILTRATION; PERINEURAL PRN
Status: DISCONTINUED | OUTPATIENT
Start: 2023-06-12 | End: 2023-06-12

## 2023-06-12 RX ORDER — ACETAMINOPHEN 325 MG/1
975 TABLET ORAL ONCE
Status: COMPLETED | OUTPATIENT
Start: 2023-06-12 | End: 2023-06-12

## 2023-06-12 RX ORDER — ONDANSETRON 4 MG/1
4-8 TABLET, ORALLY DISINTEGRATING ORAL EVERY 8 HOURS PRN
Qty: 10 TABLET | Refills: 0 | Status: SHIPPED | OUTPATIENT
Start: 2023-06-12

## 2023-06-12 RX ORDER — ALBUTEROL SULFATE 0.83 MG/ML
2.5 SOLUTION RESPIRATORY (INHALATION) EVERY 4 HOURS PRN
Status: DISCONTINUED | OUTPATIENT
Start: 2023-06-12 | End: 2023-06-12 | Stop reason: HOSPADM

## 2023-06-12 RX ORDER — HYDROMORPHONE HCL IN WATER/PF 6 MG/30 ML
0.2 PATIENT CONTROLLED ANALGESIA SYRINGE INTRAVENOUS EVERY 5 MIN PRN
Status: DISCONTINUED | OUTPATIENT
Start: 2023-06-12 | End: 2023-06-12 | Stop reason: HOSPADM

## 2023-06-12 RX ORDER — HYDROMORPHONE HCL IN WATER/PF 6 MG/30 ML
0.4 PATIENT CONTROLLED ANALGESIA SYRINGE INTRAVENOUS EVERY 5 MIN PRN
Status: DISCONTINUED | OUTPATIENT
Start: 2023-06-12 | End: 2023-06-12 | Stop reason: HOSPADM

## 2023-06-12 RX ORDER — BUPIVACAINE HYDROCHLORIDE AND EPINEPHRINE 5; 5 MG/ML; UG/ML
INJECTION, SOLUTION EPIDURAL; INTRACAUDAL; PERINEURAL PRN
Status: DISCONTINUED | OUTPATIENT
Start: 2023-06-12 | End: 2023-06-12 | Stop reason: HOSPADM

## 2023-06-12 RX ORDER — LIDOCAINE 40 MG/G
CREAM TOPICAL
Status: CANCELLED | OUTPATIENT
Start: 2023-06-12

## 2023-06-12 RX ORDER — DIMENHYDRINATE 50 MG/ML
25 INJECTION, SOLUTION INTRAMUSCULAR; INTRAVENOUS
Status: DISCONTINUED | OUTPATIENT
Start: 2023-06-12 | End: 2023-06-12 | Stop reason: HOSPADM

## 2023-06-12 RX ORDER — PROPOFOL 10 MG/ML
INJECTION, EMULSION INTRAVENOUS CONTINUOUS PRN
Status: DISCONTINUED | OUTPATIENT
Start: 2023-06-12 | End: 2023-06-12

## 2023-06-12 RX ORDER — DIAZEPAM 10 MG/2ML
2.5 INJECTION, SOLUTION INTRAMUSCULAR; INTRAVENOUS
Status: DISCONTINUED | OUTPATIENT
Start: 2023-06-12 | End: 2023-06-12 | Stop reason: HOSPADM

## 2023-06-12 RX ORDER — FENTANYL CITRATE 50 UG/ML
25 INJECTION, SOLUTION INTRAMUSCULAR; INTRAVENOUS EVERY 5 MIN PRN
Status: DISCONTINUED | OUTPATIENT
Start: 2023-06-12 | End: 2023-06-12 | Stop reason: HOSPADM

## 2023-06-12 RX ORDER — KETOROLAC TROMETHAMINE 30 MG/ML
INJECTION, SOLUTION INTRAMUSCULAR; INTRAVENOUS PRN
Status: DISCONTINUED | OUTPATIENT
Start: 2023-06-12 | End: 2023-06-12

## 2023-06-12 RX ORDER — SODIUM CHLORIDE, SODIUM LACTATE, POTASSIUM CHLORIDE, CALCIUM CHLORIDE 600; 310; 30; 20 MG/100ML; MG/100ML; MG/100ML; MG/100ML
INJECTION, SOLUTION INTRAVENOUS CONTINUOUS PRN
Status: DISCONTINUED | OUTPATIENT
Start: 2023-06-12 | End: 2023-06-12

## 2023-06-12 RX ORDER — GLYCOPYRROLATE 0.2 MG/ML
INJECTION, SOLUTION INTRAMUSCULAR; INTRAVENOUS PRN
Status: DISCONTINUED | OUTPATIENT
Start: 2023-06-12 | End: 2023-06-12

## 2023-06-12 RX ORDER — ONDANSETRON 4 MG/1
4 TABLET, ORALLY DISINTEGRATING ORAL EVERY 30 MIN PRN
Status: DISCONTINUED | OUTPATIENT
Start: 2023-06-12 | End: 2023-06-12 | Stop reason: HOSPADM

## 2023-06-12 RX ORDER — FENTANYL CITRATE 50 UG/ML
INJECTION, SOLUTION INTRAMUSCULAR; INTRAVENOUS PRN
Status: DISCONTINUED | OUTPATIENT
Start: 2023-06-12 | End: 2023-06-12

## 2023-06-12 RX ORDER — DIPHENHYDRAMINE HYDROCHLORIDE 50 MG/ML
12.5 INJECTION INTRAMUSCULAR; INTRAVENOUS EVERY 6 HOURS PRN
Status: DISCONTINUED | OUTPATIENT
Start: 2023-06-12 | End: 2023-06-12 | Stop reason: HOSPADM

## 2023-06-12 RX ORDER — ONDANSETRON 2 MG/ML
4 INJECTION INTRAMUSCULAR; INTRAVENOUS EVERY 30 MIN PRN
Status: DISCONTINUED | OUTPATIENT
Start: 2023-06-12 | End: 2023-06-12 | Stop reason: HOSPADM

## 2023-06-12 RX ORDER — ONDANSETRON 2 MG/ML
INJECTION INTRAMUSCULAR; INTRAVENOUS PRN
Status: DISCONTINUED | OUTPATIENT
Start: 2023-06-12 | End: 2023-06-12

## 2023-06-12 RX ORDER — DIPHENHYDRAMINE HCL 12.5MG/5ML
12.5 LIQUID (ML) ORAL EVERY 6 HOURS PRN
Status: DISCONTINUED | OUTPATIENT
Start: 2023-06-12 | End: 2023-06-12 | Stop reason: HOSPADM

## 2023-06-12 RX ORDER — PROPOFOL 10 MG/ML
INJECTION, EMULSION INTRAVENOUS PRN
Status: DISCONTINUED | OUTPATIENT
Start: 2023-06-12 | End: 2023-06-12

## 2023-06-12 RX ORDER — DEXAMETHASONE SODIUM PHOSPHATE 4 MG/ML
INJECTION, SOLUTION INTRA-ARTICULAR; INTRALESIONAL; INTRAMUSCULAR; INTRAVENOUS; SOFT TISSUE PRN
Status: DISCONTINUED | OUTPATIENT
Start: 2023-06-12 | End: 2023-06-12

## 2023-06-12 RX ORDER — OXYCODONE HYDROCHLORIDE 5 MG/1
5-10 TABLET ORAL
Qty: 30 TABLET | Refills: 0 | Status: SHIPPED | OUTPATIENT
Start: 2023-06-12

## 2023-06-12 RX ORDER — HALOPERIDOL 5 MG/ML
1 INJECTION INTRAMUSCULAR
Status: DISCONTINUED | OUTPATIENT
Start: 2023-06-12 | End: 2023-06-12 | Stop reason: HOSPADM

## 2023-06-12 RX ADMIN — KETOROLAC TROMETHAMINE 30 MG: 30 INJECTION, SOLUTION INTRAMUSCULAR at 07:59

## 2023-06-12 RX ADMIN — PROPOFOL 200 MG: 10 INJECTION, EMULSION INTRAVENOUS at 07:59

## 2023-06-12 RX ADMIN — ROCURONIUM BROMIDE 50 MG: 50 INJECTION, SOLUTION INTRAVENOUS at 07:59

## 2023-06-12 RX ADMIN — ROCURONIUM BROMIDE 20 MG: 50 INJECTION, SOLUTION INTRAVENOUS at 09:07

## 2023-06-12 RX ADMIN — FENTANYL CITRATE 100 MCG: 50 INJECTION, SOLUTION INTRAMUSCULAR; INTRAVENOUS at 07:59

## 2023-06-12 RX ADMIN — ACETAMINOPHEN 975 MG: 325 TABLET ORAL at 14:14

## 2023-06-12 RX ADMIN — SUGAMMADEX 200 MG: 100 INJECTION, SOLUTION INTRAVENOUS at 11:02

## 2023-06-12 RX ADMIN — GLYCOPYRROLATE 0.2 MG: 0.2 INJECTION, SOLUTION INTRAMUSCULAR; INTRAVENOUS at 07:59

## 2023-06-12 RX ADMIN — SODIUM CHLORIDE, POTASSIUM CHLORIDE, SODIUM LACTATE AND CALCIUM CHLORIDE: 600; 310; 30; 20 INJECTION, SOLUTION INTRAVENOUS at 10:00

## 2023-06-12 RX ADMIN — LIDOCAINE HYDROCHLORIDE 50 MG: 20 INJECTION, SOLUTION INFILTRATION; PERINEURAL at 07:59

## 2023-06-12 RX ADMIN — ROCURONIUM BROMIDE 20 MG: 50 INJECTION, SOLUTION INTRAVENOUS at 10:03

## 2023-06-12 RX ADMIN — PROPOFOL 50 MCG/KG/MIN: 10 INJECTION, EMULSION INTRAVENOUS at 07:59

## 2023-06-12 RX ADMIN — DEXAMETHASONE SODIUM PHOSPHATE 8 MG: 4 INJECTION, SOLUTION INTRA-ARTICULAR; INTRALESIONAL; INTRAMUSCULAR; INTRAVENOUS; SOFT TISSUE at 07:59

## 2023-06-12 RX ADMIN — SODIUM CHLORIDE, POTASSIUM CHLORIDE, SODIUM LACTATE AND CALCIUM CHLORIDE: 600; 310; 30; 20 INJECTION, SOLUTION INTRAVENOUS at 07:45

## 2023-06-12 RX ADMIN — ONDANSETRON 4 MG: 2 INJECTION INTRAMUSCULAR; INTRAVENOUS at 07:59

## 2023-06-12 RX ADMIN — HYDROMORPHONE HYDROCHLORIDE 1 MG: 1 INJECTION, SOLUTION INTRAMUSCULAR; INTRAVENOUS; SUBCUTANEOUS at 07:59

## 2023-06-12 RX ADMIN — SODIUM CHLORIDE, POTASSIUM CHLORIDE, SODIUM LACTATE AND CALCIUM CHLORIDE: 600; 310; 30; 20 INJECTION, SOLUTION INTRAVENOUS at 07:37

## 2023-06-12 RX ADMIN — ROCURONIUM BROMIDE 10 MG: 50 INJECTION, SOLUTION INTRAVENOUS at 09:34

## 2023-06-12 RX ADMIN — TAZOBACTAM SODIUM AND PIPERACILLIN SODIUM 3.38 G: 375; 3 INJECTION, SOLUTION INTRAVENOUS at 07:45

## 2023-06-12 RX ADMIN — ROCURONIUM BROMIDE 20 MG: 50 INJECTION, SOLUTION INTRAVENOUS at 08:33

## 2023-06-12 ASSESSMENT — ACTIVITIES OF DAILY LIVING (ADL)
ADLS_ACUITY_SCORE: 22
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
## 2023-06-12 NOTE — PROVIDER NOTIFICATION
"Dr. Cole texted.  Pre op order for \"alternative antibiotic\" but there is no order.  Wondering what antibiotic needed?  Awaiting call back.  "

## 2023-06-12 NOTE — ANESTHESIA POSTPROCEDURE EVALUATION
Patient: Blanquita Trevizo    Procedure: Procedure(s):  Xi Robotic Assisted incisional hernia repair with mesh       Anesthesia Type:  General    Note:  Disposition: Inpatient   Postop Pain Control: Uneventful            Sign Out: Well controlled pain   PONV: No   Neuro/Psych: Uneventful            Sign Out: Acceptable/Baseline neuro status   Airway/Respiratory: Uneventful            Sign Out: Acceptable/Baseline resp. status   CV/Hemodynamics: Uneventful            Sign Out: Acceptable CV status; No obvious hypovolemia; No obvious fluid overload   Other NRE: NONE   DID A NON-ROUTINE EVENT OCCUR? No           Last vitals:  Vitals Value Taken Time   /75 06/12/23 1355   Temp 97  F (36.1  C) 06/12/23 1245   Pulse 81 06/12/23 1359   Resp 15 06/12/23 1359   SpO2 95 % 06/12/23 1359   Vitals shown include unvalidated device data.    Electronically Signed By: Pritesh Aguilar MD  June 12, 2023  2:00 PM

## 2023-06-12 NOTE — DISCHARGE INSTRUCTIONS
"HOME CARE FOLLOWING INCISIONAL HERNIA REPAIR  LUCIEN Moore, TIFFANI Contreras, JUICE Zavala, ERGI Luke    DIET:  Start with liquids and gradually resume your regular diet as tolerated.  Increased fluid intake is recommended. While taking pain medications, consider use of a stool softener, increase your fiber in your diet, or add a fiber supplement (like Metamucil, Citrucel) to help prevent constipation - a possible side effect of pain medications.    NAUSEA:  If nauseated from the anesthetic/pain meds; rest in bed, get up cautiously with assistance, and drink clear liquids (juice, tea, broth).    ACTIVITY:  Light Activity -- you may immediately be up and about as tolerated.  Walking is encouraged, increase as tolerated.  Driving/Light Work-- when comfortable and off narcotic pain medications.  Strenuous Work/Activity -- limit lifting to 20 pounds for 6 weeks.  Active Sports (running, biking, etc.) -- cautiously resume after 4 weeks.    INCISIONAL CARE:  If you have a dressing in place, keep clean and dry for 48 hours after surgery.  After this timeframe, you may replace the gauze daily if it becomes soiled.  You may remove the dressing and shower 48 hours after surgery.  Do not submerse incision in water for 1 week.  If you have a Dermabond dressing (a type of skin glue), you may shower immediately.  Sutures will absorb and need not be removed.  If present, leave the steri-strips (white paper tapes) in place for 14 days after surgery.  If present, leave Dermabond glue in place until it wears/flakes off.  Do not apply lotions, creams, or ointments to incisions.  Expect a variable amount of swelling/bruising/discoloration that may appear around or below the repair site.  Some numbness around the incision is common.  A lump/\"healing ridge\" under the incision is normal and will gradually resolve over the following 1-2 months.    DISCOMFORT:  Local anesthetic placed at surgery should provide relief for " 4-8 hours.  Begin taking pain pills before discomfort is severe.  Take the pain medication with some food, when possible, to minimize side effects.  Intermittent use of ice packs to the hernia repair site may help during the first 1-3 weeks after surgery.  Expect gradual improvement.    Over-the-counter anti-inflammatory medications (i.e. Ibuprofen/Advil/Motrin or Naprosyn/Aleve) may be used per package instructions in addition to or while tapering off the narcotic pain medications to decrease swelling and sensitivity at the repair site.  DO NOT TAKE these Anti-inflammatory medications if your primary physician has advised against doing so, or if you have acid reflux, ulcer, or bleeding disorder, or take blood-thinner medications.  Call your primary physician or the surgery office if you have medication questions.      FOLLOW-UP AFTER SURGERY:  -Our office will contact you approximately 2-3 weeks after surgery to check on your progress and answer any questions you may have.  If you are doing well, you will not need to return for an office appointment.  If any concerns are identified over the phone, we will help you make an appointment to see a provider.    -If you have not received a phone call, have any questions or concerns, or would like to be seen, please call us at [phone].  We are located at: [address], [address]; [place] [address]    -CONTACT US IF THE FOLLOWING DEVELOPS:   1. A fever that is above 101     2. Increased redness, warmth, drainage, bleeding, or swelling.   3. Pain that is not relieved by rest/ice and your prescription.   4.  Increasing pain after 48 hours.   5. Drainage that is thick, cloudy, yellow, green or white.   6. Any other questions or concerns.      FREQUENTLY ASKED QUESTIONS:    Q:  How should my incision look?    A:  Normally your incision will appear slightly swollen with light redness directly along the incision itself as it heals.  It may feel like a bump or ridge  as the healing/scarring happens, and over time (3-4 months) this bump or ridge feeling should slowly go away.  In general, clear or pink watery drainage can be normal at first as your incision heals, but should decrease over time.    Q:  How do I know if my incision is infected?  A:  Look at your incision for signs of infection, like redness around the incision spreading to surrounding skin, or drainage of cloudy or foul-smelling drainage.  If you feel warm, check your temperature to see if you are running a fever.    **If any of these things occur, please notify the nurse at our office.  We may need you to come into the office for an incision check.      Q:  How do I take care of my incision?  A:  If you have a dressing in place - Starting the day after surgery, replace the dressing 1-2 times a day until there is no further drainage from the incision.  At that time, a dressing is no longer needed.  Try to minimize tape on the skin if irritation is occurring at the tape sites.  If you have significant irritation from tape on the skin, please call the office to discuss other method of dressing your incision.    Small pieces of tape called  steri-strips  may be present directly overlying your incision; these may be removed 10 days after surgery unless otherwise specified by your surgeon.  If these tapes start to loosen at the ends, you may trim them back until they fall off or are removed.    A:  If you had  Dermabond  tissue glue used as a dressing (this causes your incision to look shiny with a clear covering over it) - This type of dressing wears off with time and does not require more dressings over the top unless it is draining around the glue as it wears off.  Do not apply ointments or lotions over the incisions until the glue has completely worn off.    Q:  There is a piece of tape or a sticky  lead  still on my skin.  Can I remove this?  A:  Sometimes the sticky  leads  used for monitoring during surgery or  for evaluation in the emergency department are not all removed while you are in the hospital.  These sometimes have a tab or metal dot on them.  You can easily remove these on your own, like taking off a band-aid.  If there is a gel substance under the  lead , simply wipe/clean it off with a washcloth or paper towel.      Q:  What can I do to minimize constipation (very hard stools, or lack of stools)?  A:  Stay well hydrated.  Increase your dietary fiber intake or take a fiber supplement -with plenty of water.  Walk around frequently.  You may consider an over-the-counter stool-softener.  Your Pharmacist can assist you with choosing one that is stocked at your pharmacy.  Constipation is also one of the most common side effects of pain medication.  If you are using pain medication, be pro-active and try to PREVENT problems with constipation by taking the steps above BEFORE constipation becomes a problem.    Q:  What do I do if I need more pain medications?  A:  Call the office to receive refills.  Be aware that certain pain meds cannot be called into a pharmacy and actually require a paper prescription.  A change may be made in your pain med as you progress thru your recovery period or if you have side effects to certain meds.    --Pain meds are NOT refilled after 5pm on weekdays, and NOT AT ALL on the weekends, so please look ahead to prevent problems.    Q:  Why am I having a hard time sleeping now that I am at home?  A:  Many medications you receive while you are in the hospital can impact your sleep for a number of days after your surgery/hospitalization.  Decreased level of activity and naps during the day may also make sleeping at night difficult.  Try to minimize day-time naps, and get up frequently during the day to walk around your home during your recovery time.  Sleep aides may be of some help, but are not recommended for long-term use.      Q:  I am having some back discomfort.  What should I do?  A:   This may be related to certain positioning that was required for your surgery, extended periods of time in bed, or other changes in your overall activity level.  You may try ice, heat, acetaminophen, or ibuprofen to treat this temporarily.  Note that many pain medications have acetaminophen in them and would state this on the prescription bottle.  Be sure not to exceed the maximum of 4000mg per day of acetaminophen.     **If the pain you are having does not resolve, is severe, or is a flare of back pain you have had on other occasions prior to surgery, please contact your primary physician for further recommendations or for an appointment to be examined at their office.    Q:  Why am I having headaches?  A:  Headaches can be caused by many things:  caffeine withdrawal, use of pain meds, dehydration, high blood pressure, lack of sleep, over-activity/exhaustion, flare-up of usual migraine headaches.  If you feel this is related to muscle tension (a band-like feeling around the head, or a pressure at the low-back of the head) you may try ice or heat to this area.  You may need to drink more fluids (try electrolyte drink like Gatorade), rest, or take your usual migraine medications.   **If your headaches do not resolve, worsen, are accompanied by other symptoms, or if your blood pressure is high, please call your primary physician for recommendation and/or examination.    Q:  I am unable to urinate.  What do I do?  A:  A small percentage of people can have difficulty urinating initially after surgery.  This includes being able to urinate only a very small amount at a time and feeling discomfort or pressure in the very low abdomen.  This is called  urinary retention , and is actually an urgent situation.  Proceed to your nearest Emergency department for evaluation (not an Urgent Care Center).  Sometimes the bladder does not work correctly after certain medications you receive during surgery, or related to certain  procedures.  You may need to have a catheter placed until your bladder recovers.  When planning to go to an Emergency department, it may help to call the ER to let them know you are coming in for this problem after a surgery.  This may help you get in quicker to be evaluated.  **If you have symptoms of a urinary tract infection, please contact your primary physician for the proper evaluation and treatment.        If you have other questions, please call the office Monday thru Friday between 8am and 4:30pm to discuss with the nurse or physician assistant.  #[phone]    There is a surgeon ON CALL on weekday evenings and over the weekend in case of urgent need only, and may be contacted at the same number.    If you are having an emergency, call 911 or proceed to your nearest emergency department.      GENERAL ANESTHESIA OR SEDATION ADULT DISCHARGE INSTRUCTIONS   SPECIAL PRECAUTIONS FOR 24 HOURS AFTER SURGERY    IT IS NOT UNUSUAL TO FEEL LIGHT-HEADED OR FAINT, UP TO 24 HOURS AFTER SURGERY OR WHILE TAKING PAIN MEDICATION.  IF YOU HAVE THESE SYMPTOMS; SIT FOR A FEW MINUTES BEFORE STANDING AND HAVE SOMEONE ASSIST YOU WHEN YOU GET UP TO WALK OR USE THE BATHROOM.    YOU SHOULD REST AND RELAX FOR THE NEXT 24 HOURS AND YOU MUST MAKE ARRANGEMENTS TO HAVE SOMEONE STAY WITH YOU FOR AT LEAST 24 HOURS AFTER YOUR DISCHARGE.  AVOID HAZARDOUS AND STRENUOUS ACTIVITIES.  DO NOT MAKE IMPORTANT DECISIONS FOR 24 HOURS.    DO NOT DRIVE ANY VEHICLE OR OPERATE MECHANICAL EQUIPMENT FOR 24 HOURS FOLLOWING THE END OF YOUR SURGERY.  EVEN THOUGH YOU MAY FEEL NORMAL, YOUR REACTIONS MAY BE AFFECTED BY THE MEDICATION YOU HAVE RECEIVED.    DO NOT DRINK ALCOHOLIC BEVERAGES FOR 24 HOURS FOLLOWING YOUR SURGERY.    DRINK CLEAR LIQUIDS (APPLE JUICE, GINGER ALE, 7-UP, BROTH, ETC.).  PROGRESS TO YOUR REGULAR DIET AS YOU FEEL ABLE.    YOU MAY HAVE A DRY MOUTH, A SORE THROAT, MUSCLES ACHES OR TROUBLE SLEEPING.  THESE SHOULD GO AWAY AFTER 24  HOURS.    CALL YOUR DOCTOR FOR ANY OF THE FOLLOWING:  SIGNS OF INFECTION (FEVER, GROWING TENDERNESS AT THE SURGERY SITE, A LARGE AMOUNT OF DRAINAGE OR BLEEDING, SEVERE PAIN, FOUL-SMELLING DRAINAGE, REDNESS OR SWELLING.    IT HAS BEEN OVER 8 TO 10 HOURS SINCE SURGERY AND YOU ARE STILL NOT ABLE TO URINATE (PASS WATER).         You received Toradol, an IV form of Ibuprofen (Motrin) at 8 am.  Do not take any Ibuprofen products until 2 pm.    Maximum acetaminophen (Tylenol) dose from all sources should not exceed 4 grams (4000 mg) per day.  You received 975 mg of tylenol at 2:20 pm.

## 2023-06-12 NOTE — PROGRESS NOTES
Notified MD at 1545 PM regarding O2 SATs between 88-95%% on RA. pt working on Incentive spirometer with very low volumes. Pt denies SOB. Awake and alert. Pt has not gone below 88% in the past hour on RA. .      Spoke with: Dr Santos    Orders were not obtained.    Comments: Pt ok to discharge home

## 2023-06-12 NOTE — ANESTHESIA PROCEDURE NOTES
Airway       Patient location during procedure: OR       Procedure Start/Stop Times: 6/12/2023 8:01 AM  Staff -        CRNA: Nohemi Jones APRN CRNA       Performed By: CRNA  Consent for Airway        Urgency: elective  Indications and Patient Condition       Indications for airway management: kp-procedural       Induction type:intravenous       Mask difficulty assessment: 1 - vent by mask    Final Airway Details       Final airway type: endotracheal airway       Successful airway: ETT - single and Oral  Endotracheal Airway Details        ETT size (mm): 7.0       Cuffed: yes       Successful intubation technique: direct laryngoscopy       DL Blade Type: Clement 2       Grade View of Cords: 1       Adjucts: stylet       Position: Right       Measured from: lips       Secured at (cm): 22       Bite block used: None    Post intubation assessment        Placement verified by: capnometry, equal breath sounds and chest rise        Number of attempts at approach: 1       Number of other approaches attempted: 0       Secured with: plastic tape       Ease of procedure: easy       Dentition: Unchanged    Medication(s) Administered   Medication Administration Time: 6/12/2023 8:01 AM

## 2023-06-12 NOTE — ANESTHESIA CARE TRANSFER NOTE
Patient: Blanquita Trevizo    Procedure: Procedure(s):  Xi Robotic Assisted incisional hernia repair with mesh       Diagnosis: Incisional hernia, without obstruction or gangrene [K43.2]  Diagnosis Additional Information: No value filed.    Anesthesia Type:   General     Note:    Oropharynx: oral airway in place and spontaneously breathing  Level of Consciousness: drowsy  Oxygen Supplementation: face mask  Level of Supplemental Oxygen (L/min / FiO2): 6  Independent Airway: airway patency satisfactory and stable  Dentition: dentition unchanged  Vital Signs Stable: post-procedure vital signs reviewed and stable  Report to RN Given: handoff report given  Patient transferred to: PACU    Handoff Report: Identifed the Patient, Identified the Reponsible Provider, Reviewed the pertinent medical history, Discussed the surgical course, Reviewed Intra-OP anesthesia mangement and issues during anesthesia, Set expectations for post-procedure period and Allowed opportunity for questions and acknowledgement of understanding      Vitals:  Vitals Value Taken Time   /74 06/12/23 1117   Temp     Pulse 70 06/12/23 1120   Resp 15 06/12/23 1120   SpO2 100 % 06/12/23 1120   Vitals shown include unvalidated device data.    Electronically Signed By: TAD Macario CRNA  June 12, 2023  11:22 AM

## 2023-06-12 NOTE — OP NOTE
General Surgery Operative Note    Pre-operative diagnosis:  Incisional hernia, without obstruction or gangrene [K43.2]   Post-operative diagnosis: same   Procedure:  Robotic assisted incisional hernia repair with mesh   Surgeon: Kolby Cole MD   Assistant(s): Madyson Lan PA-C - the physician assistant was medically necessary to assist in prepping, positioning, camera operation, retraction/exposure and instrument exchange.   Anesthesia: General    Estimated blood loss: 54 cc's   Drains placed: None   Complications:  None   Findings:   Multiple supraumbilical defects as well as a small umbilical defect.  These were closed along the midline using a running 0 STRATAFIX suture.  Parietene DS Mesh was then placed along the midline from nearly the xiphoid down to below the umbilicus.     Indications for operation: This is a 72-year-old woman with a progressive hernia and an upper abdominal incision.  Due to a previous trauma surgery, the patient has an absent left rectus muscle, as that was used for reconstruction of her leg.  We discussed the options of observation versus robotic repair.  We discussed the procedure, along with its risks and complications, in detail.  The patient has agreed to proceed.    Details of the operation: After informed consent, the patient was taken to the operating room, where she underwent satisfactory induction of general anesthesia.  The patient was sterilely prepped and draped and a left upper quadrant incision was made.  A 5 mm optical entry port was used to access the peritoneal cavity without difficulty.  Pneumoperitoneum was achieved using CO2 insufflation and, under direct visualization, 3 robotic 8 mm ports were placed on the right side.  There were a fair number of adhesions in the upper abdomen.  The robot was brought in and docked without difficulty.  We then proceeded to take down some adhesions to the right of midline and to clear adhesions along the midline.  This  resulted in reducing multiple hernias containing fat and colon.  Eventually, I was able to get all hernias reduced, including an umbilical hernia.  We did enter the preperitoneal space around the umbilicus and in the area of the falciform, clearing the fascia for mesh placement.  I now utilized a 0 STRATAFIX suture to close the defects longitudinally in the midline.  We brought the entire midline together without too much tension.  The space was measured and was found to be 23 cm in length.  Therefore brought a 20 x 30 cm Parietene DS Mesh onto the field and trimmed this to 23 x 11 cm.  This was then placed into the peritoneal cavity and sutured up to the abdominal wall using 2-0 Vicryl sutures along the midline and along both edges.  The periphery of the mesh was now sutured using 3-0 V lock sutures.  I brought up a good portion of peritoneum to cover the bottom half of the mesh during this process.  I was able to bring a portion of the falciform down over the mesh as well.  Mesh lay nicely against the abdominal wall.  Pneumoperitoneum was now released and the trocars were removed.  The CO2 was aspirated out of the hernia cavity.  Skin incisions were closed using 4-0 subcuticular Vicryl followed by Steri-Strips.    The patient tolerated the procedure well and was transferred to the recovery room in satisfactory condition.  Sponge and needle counts were correct at the close of the case.    Specimens: * No specimens in log *        Kolby Cole MD

## 2023-06-13 ENCOUNTER — TELEPHONE (OUTPATIENT)
Dept: SURGERY | Facility: CLINIC | Age: 73
End: 2023-06-13
Payer: COMMERCIAL

## 2023-06-13 NOTE — TELEPHONE ENCOUNTER
[facility]oke with [facility] who reports that zofran needs a prior authorization because it is 2 day or less quantity. If 3 day or more quantity, will not need prior auth    Contacted pharmacy who reports patient was provided the medication with a coupon card    No need to complete prior auth at this time    Janis Ervin RN-BSN

## 2023-06-13 NOTE — TELEPHONE ENCOUNTER
Name of caller: Patient    Reason for Call:  Need rx adjusted    Surgeon:  Dr. Cole    Recent Surgery:  Yes.    If yes, when & what type:  6/12/23 hernia      Best phone number to reach pt at is: [facility] [phone]  Ok to leave a message with medical info? Yes.    Pharmacy preferred (if calling for a refill): na

## 2023-06-15 ENCOUNTER — TELEPHONE (OUTPATIENT)
Dept: SURGERY | Facility: CLINIC | Age: 73
End: 2023-06-15
Payer: COMMERCIAL

## 2023-06-15 NOTE — TELEPHONE ENCOUNTER
Name of caller: Patient    Reason for Call:  Pain    Surgeon:  Dr. Cole    Recent Surgery:  Yes.    If yes, when & what type:  6/12/23 Hernia      Best phone number to reach pt at is: [phone]  Ok to leave a message with medical info? Yes.    Pharmacy preferred (if calling for a refill): na

## 2023-06-15 NOTE — TELEPHONE ENCOUNTER
S/p  Robotic assisted incisional hernia repair with mesh  Procedure date: 6/12/23  Surgeon: Dr. Cole     Patient is calling to report that she has not had a BM since 6/11/23.  She has taken some metamucil gummies, prune juice and applesauce.    She is taking oxycodone for pain.      She is up walking in the house and outside for a short walk yesterday.  Still quite sore after surgery.  Eating and drinking without nausea or vomiting.      -Dulcolax suppository this afternoon  -Miralax this evening if she does not have results or she is not having complete emptying.    -ok to take Miralax Q day prn.  - Alternate ES Tylenol - 2 tabs every 6 hours (not more than 4000mg/day) and Ibuprofen, 600mg every 6 hours (not more than 2400mg/day).    -limit use of oxycodone,  Ok to take for pain not adequately relieved with Tylenol / ibuprofen.    -take both oxycodone and ibuprofen with food to prevent GI side effects.     She will call clinic if no results from suppository and Miralax.      All questions answered.  Pt verbalizes understanding of our conversation and is in agreement with this plan.

## 2023-07-03 NOTE — LETTER
2020    RE: Blanquita Trevizo, : 1950      HPI:  I was asked by Nasra Herron PA-C to evaluate this patient regarding upper abdominal swelling and gallstones.  Blanquita is a 69 year old female who presents for evaluation of upper abdominal bulging.  She underwent a surgery of her trauma in .  At that time, she had a laparotomy and a splenectomy.  She also had a muscle flap taken from her left rectus muscle to re-build her leg.  In , the patient had surgery for endometrial carcinoma.  This was done with robotic assistance.  Over the last 9 months or so, the patient is noticed increased gurgling in her abdomen as well as increased bulging.  She denies any pain.  She does feel that if she overeats, the symptoms are worse.     Past Medical History:  Has a past medical history of Atypical glandular cells on Pap smear (2011), Congenital anomalies of spleen (), Disorder of bone and cartilage, unspecified, Hyperlipidemia LDL goal <130 (2008), Lumbago (), Osteopenia (), Other acute rheumatic heart disease (), Pain in limb (), and Unspecified arthropathy, pelvic region and thigh ().     ROS:  The 10 point review of systems is negative other than noted in the HPI and above.     PE:    General- Well-developed, well-nourished, patient able to get up on table without difficulty.  HEENT- Normocephalic and atraumatic. Pupils equal and round.  Mucous membranes moist.  Sclera are nonicteric.  Neck- No lymphadenopathy or masses   Respirations- are regular and non labored  Abdomen is abdomen is soft without significant tenderness, masses, organomegaly or guarding  There is a bulge which contains obvious intestine in the upper abdomen along her old midline incision.  This is largely reducible.  It is nontender.  There is some vague bulging below this.  There is a paramedian longitudinal scar in the left abdomen.     Recent CT scan showed an incisional hernia containing colon.   Multiple defects are seen.  In addition, there is an incidentally noted gallstone.               Assessment/Plan:  Complex incisional hernia.  The patient is missing a good portion of her left rectus muscle and has multiple defects.  There is an approximate 5 cm diastases and there is a defect which goes all the way across this at one point.  She has at least 4 separate hernia defects.  At this point, the patient is not particularly interested in surgical intervention.  I suggested she could try a binder.  If her hernia is progressing, we might need to consider some sort of repair.  This might best be approached with a robotic intraperitoneal onlay mesh.  An open approach could be considered, but might be challenging given the lack of a left rectus muscle.  The patient will see how she does over the next few months.  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  She understands she needs to seek urgent assistance if her hernia ever gets stuck out.           Kolby Cole MD        Normal for race

## 2023-07-06 ENCOUNTER — TELEPHONE (OUTPATIENT)
Dept: SURGERY | Facility: CLINIC | Age: 73
End: 2023-07-06
Payer: COMMERCIAL

## 2023-07-06 DIAGNOSIS — Z98.890 POSTOPERATIVE STATE: Primary | ICD-10-CM

## 2023-07-06 PROCEDURE — 99024 POSTOP FOLLOW-UP VISIT: CPT | Performed by: PHYSICIAN ASSISTANT

## 2023-10-03 ENCOUNTER — TELEPHONE (OUTPATIENT)
Dept: FAMILY MEDICINE | Facility: CLINIC | Age: 73
End: 2023-10-03

## 2023-10-03 DIAGNOSIS — E78.5 HYPERLIPIDEMIA LDL GOAL <130: ICD-10-CM

## 2023-10-03 DIAGNOSIS — Z13.0 SCREENING FOR DEFICIENCY ANEMIA: ICD-10-CM

## 2023-10-03 DIAGNOSIS — R73.01 IMPAIRED FASTING GLUCOSE: ICD-10-CM

## 2023-10-03 DIAGNOSIS — M85.80 OSTEOPENIA, UNSPECIFIED LOCATION: ICD-10-CM

## 2023-10-03 DIAGNOSIS — I35.8 AORTIC VALVE SCLEROSIS: ICD-10-CM

## 2023-10-03 DIAGNOSIS — I34.0 MITRAL VALVE INSUFFICIENCY, UNSPECIFIED ETIOLOGY: ICD-10-CM

## 2023-10-03 DIAGNOSIS — I25.10 ASCVD (ARTERIOSCLEROTIC CARDIOVASCULAR DISEASE): ICD-10-CM

## 2023-10-03 DIAGNOSIS — I10 HYPERTENSION GOAL BP (BLOOD PRESSURE) < 130/80: Primary | ICD-10-CM

## 2023-10-03 NOTE — TELEPHONE ENCOUNTER
Placed call to patient to help reschedule 10/12/23 ANW. Patient is rescheduled to 12/21/23. Patient scheduled a 12/18/23 lab only appointment due to ANW being too late in the day. Please advise lab orders.

## 2023-11-04 ENCOUNTER — HEALTH MAINTENANCE LETTER (OUTPATIENT)
Age: 73
End: 2023-11-04

## 2023-12-23 DIAGNOSIS — I10 HYPERTENSION GOAL BP (BLOOD PRESSURE) < 130/80: ICD-10-CM

## 2023-12-26 ENCOUNTER — MYC MEDICAL ADVICE (OUTPATIENT)
Dept: FAMILY MEDICINE | Facility: CLINIC | Age: 73
End: 2023-12-26
Payer: COMMERCIAL

## 2023-12-26 DIAGNOSIS — I10 HYPERTENSION GOAL BP (BLOOD PRESSURE) < 130/80: ICD-10-CM

## 2023-12-26 DIAGNOSIS — R21 RASH: ICD-10-CM

## 2023-12-26 DIAGNOSIS — E78.5 HYPERLIPIDEMIA LDL GOAL <130: ICD-10-CM

## 2023-12-26 DIAGNOSIS — I35.8 AORTIC VALVE SCLEROSIS: ICD-10-CM

## 2023-12-26 DIAGNOSIS — I34.0 MITRAL VALVE INSUFFICIENCY, UNSPECIFIED ETIOLOGY: ICD-10-CM

## 2023-12-26 RX ORDER — LOSARTAN POTASSIUM 25 MG/1
25 TABLET ORAL DAILY
Qty: 90 TABLET | Refills: 3 | OUTPATIENT
Start: 2023-12-26

## 2023-12-26 NOTE — TELEPHONE ENCOUNTER
Denied.  Pt has new pcp - Ginny Bradford  -sent message to pharmacy asking them to route to new PCP

## 2023-12-27 RX ORDER — LOSARTAN POTASSIUM 25 MG/1
25 TABLET ORAL DAILY
Qty: 90 TABLET | Refills: 0 | Status: SHIPPED | OUTPATIENT
Start: 2023-12-27 | End: 2024-04-01

## 2023-12-27 RX ORDER — ROSUVASTATIN CALCIUM 20 MG/1
20 TABLET, COATED ORAL DAILY
Qty: 90 TABLET | Refills: 0 | Status: SHIPPED | OUTPATIENT
Start: 2023-12-27 | End: 2024-04-01

## 2023-12-27 RX ORDER — TRIAMCINOLONE ACETONIDE 1 MG/G
CREAM TOPICAL 2 TIMES DAILY
Qty: 30 G | Refills: 1 | Status: SHIPPED | OUTPATIENT
Start: 2023-12-27 | End: 2024-07-23

## 2023-12-27 NOTE — TELEPHONE ENCOUNTER
Medications refilled per protocol- pt last saw ANGEL LUIS Herron on 5/31/23, and [facility]    Charlotte Montgomery RN on 12/27/2023 at 9:46 AM

## 2024-02-19 ENCOUNTER — PATIENT OUTREACH (OUTPATIENT)
Dept: GASTROENTEROLOGY | Facility: CLINIC | Age: 74
End: 2024-02-19
Payer: COMMERCIAL

## 2024-03-05 ENCOUNTER — HOSPITAL ENCOUNTER (OUTPATIENT)
Dept: MAMMOGRAPHY | Facility: CLINIC | Age: 74
Discharge: HOME | End: 2024-03-05
Attending: PHYSICIAN ASSISTANT | Admitting: PHYSICIAN ASSISTANT
Payer: COMMERCIAL

## 2024-03-05 DIAGNOSIS — Z12.31 VISIT FOR SCREENING MAMMOGRAM: ICD-10-CM

## 2024-03-05 PROCEDURE — 77063 BREAST TOMOSYNTHESIS BI: CPT

## 2024-03-05 NOTE — RESULT ENCOUNTER NOTE
Blanquita  I have reviewed your recent labs. Here are the results:    -Mammogram was normal.  ADVISE: rechecking in 1 year.     If you have any questions please do not hesitate to contact our office via phone ([phone]) or [email or web address].    Healthy regards,     Nasra Herron MBA, MS, PA-C  [facility]

## 2024-03-22 DIAGNOSIS — I35.8 AORTIC VALVE SCLEROSIS: ICD-10-CM

## 2024-03-22 DIAGNOSIS — I34.0 MITRAL VALVE INSUFFICIENCY, UNSPECIFIED ETIOLOGY: ICD-10-CM

## 2024-03-22 DIAGNOSIS — E78.5 HYPERLIPIDEMIA LDL GOAL <130: ICD-10-CM

## 2024-03-22 RX ORDER — ROSUVASTATIN CALCIUM 20 MG/1
20 TABLET, COATED ORAL DAILY
Qty: 90 TABLET | Refills: 0 | OUTPATIENT
Start: 2024-03-22

## 2024-04-01 ENCOUNTER — MYC REFILL (OUTPATIENT)
Dept: FAMILY MEDICINE | Facility: CLINIC | Age: 74
End: 2024-04-01
Payer: COMMERCIAL

## 2024-04-01 DIAGNOSIS — I34.0 MITRAL VALVE INSUFFICIENCY, UNSPECIFIED ETIOLOGY: ICD-10-CM

## 2024-04-01 DIAGNOSIS — I10 HYPERTENSION GOAL BP (BLOOD PRESSURE) < 130/80: ICD-10-CM

## 2024-04-01 DIAGNOSIS — I35.8 AORTIC VALVE SCLEROSIS: ICD-10-CM

## 2024-04-01 DIAGNOSIS — E78.5 HYPERLIPIDEMIA LDL GOAL <130: ICD-10-CM

## 2024-04-02 RX ORDER — ROSUVASTATIN CALCIUM 20 MG/1
20 TABLET, COATED ORAL DAILY
Qty: 90 TABLET | Refills: 0 | Status: SHIPPED | OUTPATIENT
Start: 2024-04-02 | End: 2024-07-15

## 2024-04-02 RX ORDER — LOSARTAN POTASSIUM 25 MG/1
25 TABLET ORAL DAILY
Qty: 90 TABLET | Refills: 0 | Status: SHIPPED | OUTPATIENT
Start: 2024-04-02 | End: 2024-07-15

## 2024-07-14 DIAGNOSIS — I34.0 MITRAL VALVE INSUFFICIENCY, UNSPECIFIED ETIOLOGY: ICD-10-CM

## 2024-07-14 DIAGNOSIS — E78.5 HYPERLIPIDEMIA LDL GOAL <130: ICD-10-CM

## 2024-07-14 DIAGNOSIS — I35.8 AORTIC VALVE SCLEROSIS: ICD-10-CM

## 2024-07-14 DIAGNOSIS — I10 HYPERTENSION GOAL BP (BLOOD PRESSURE) < 130/80: ICD-10-CM

## 2024-07-15 RX ORDER — ROSUVASTATIN CALCIUM 20 MG/1
20 TABLET, COATED ORAL DAILY
Qty: 90 TABLET | Refills: 0 | Status: SHIPPED | OUTPATIENT
Start: 2024-07-15

## 2024-07-15 RX ORDER — LOSARTAN POTASSIUM 25 MG/1
25 TABLET ORAL DAILY
Qty: 90 TABLET | Refills: 1 | Status: SHIPPED | OUTPATIENT
Start: 2024-07-15

## 2024-07-21 DIAGNOSIS — R21 RASH: ICD-10-CM

## 2024-07-23 RX ORDER — TRIAMCINOLONE ACETONIDE 1 MG/G
CREAM TOPICAL 2 TIMES DAILY
Qty: 30 G | Refills: 1 | Status: SHIPPED | OUTPATIENT
Start: 2024-07-23

## 2024-10-14 DIAGNOSIS — I35.8 AORTIC VALVE SCLEROSIS: ICD-10-CM

## 2024-10-14 DIAGNOSIS — E78.5 HYPERLIPIDEMIA LDL GOAL <130: ICD-10-CM

## 2024-10-14 DIAGNOSIS — I34.0 MITRAL VALVE INSUFFICIENCY, UNSPECIFIED ETIOLOGY: ICD-10-CM

## 2024-10-14 RX ORDER — ROSUVASTATIN CALCIUM 20 MG/1
20 TABLET, COATED ORAL DAILY
Qty: 90 TABLET | Refills: 0 | Status: SHIPPED | OUTPATIENT
Start: 2024-10-14

## 2024-12-17 ENCOUNTER — OFFICE VISIT (OUTPATIENT)
Dept: FAMILY MEDICINE | Facility: CLINIC | Age: 74
End: 2024-12-17
Payer: COMMERCIAL

## 2024-12-17 VITALS
OXYGEN SATURATION: 96 % | WEIGHT: 190 LBS | RESPIRATION RATE: 14 BRPM | DIASTOLIC BLOOD PRESSURE: 70 MMHG | HEART RATE: 77 BPM | TEMPERATURE: 97.6 F | HEIGHT: 63 IN | BODY MASS INDEX: 33.66 KG/M2 | SYSTOLIC BLOOD PRESSURE: 124 MMHG

## 2024-12-17 DIAGNOSIS — D12.6 TUBULAR ADENOMA OF COLON: ICD-10-CM

## 2024-12-17 DIAGNOSIS — R73.01 IMPAIRED FASTING GLUCOSE: ICD-10-CM

## 2024-12-17 DIAGNOSIS — Z13.0 SCREENING FOR DEFICIENCY ANEMIA: ICD-10-CM

## 2024-12-17 DIAGNOSIS — Z12.83 SCREENING FOR SKIN CANCER: ICD-10-CM

## 2024-12-17 DIAGNOSIS — Z23 NEED FOR MENINGITIS VACCINATION: ICD-10-CM

## 2024-12-17 DIAGNOSIS — C54.1 ENDOMETRIAL CANCER (H): ICD-10-CM

## 2024-12-17 DIAGNOSIS — E78.5 HYPERLIPIDEMIA LDL GOAL <130: ICD-10-CM

## 2024-12-17 DIAGNOSIS — Z78.0 ASYMPTOMATIC MENOPAUSAL STATE: ICD-10-CM

## 2024-12-17 DIAGNOSIS — S32.010S COMPRESSION FRACTURE OF L1 VERTEBRA, SEQUELA: ICD-10-CM

## 2024-12-17 DIAGNOSIS — Z00.00 ENCOUNTER FOR MEDICARE ANNUAL WELLNESS EXAM: Primary | ICD-10-CM

## 2024-12-17 DIAGNOSIS — K57.30 DIVERTICULAR DISEASE OF LARGE INTESTINE: ICD-10-CM

## 2024-12-17 DIAGNOSIS — I10 HYPERTENSION GOAL BP (BLOOD PRESSURE) < 130/80: ICD-10-CM

## 2024-12-17 DIAGNOSIS — Z91.89 FRACTURE RISK ASSESSMENT SCORE (FRAX) INDICATING GREATER THAN 3% RISK FOR HIP FRACTURE: ICD-10-CM

## 2024-12-17 DIAGNOSIS — Z23 NEEDS FLU SHOT: ICD-10-CM

## 2024-12-17 DIAGNOSIS — M85.80 OSTEOPENIA, UNSPECIFIED LOCATION: ICD-10-CM

## 2024-12-17 DIAGNOSIS — R21 RASH: ICD-10-CM

## 2024-12-17 DIAGNOSIS — I35.8 AORTIC VALVE SCLEROSIS: ICD-10-CM

## 2024-12-17 DIAGNOSIS — I34.0 MITRAL VALVE INSUFFICIENCY, UNSPECIFIED ETIOLOGY: ICD-10-CM

## 2024-12-17 DIAGNOSIS — Z90.81 S/P SPLENECTOMY: ICD-10-CM

## 2024-12-17 DIAGNOSIS — E66.811 OBESITY, CLASS I, BMI 30.0-34.9 (SEE ACTUAL BMI): ICD-10-CM

## 2024-12-17 PROBLEM — K43.2 INCISIONAL HERNIA, WITHOUT OBSTRUCTION OR GANGRENE: Status: RESOLVED | Noted: 2022-09-19 | Resolved: 2024-12-17

## 2024-12-17 LAB
ALBUMIN SERPL BCG-MCNC: 4 G/DL (ref 3.5–5.2)
ALP SERPL-CCNC: 43 U/L (ref 40–150)
ALT SERPL W P-5'-P-CCNC: 15 U/L (ref 0–50)
ANION GAP SERPL CALCULATED.3IONS-SCNC: 12 MMOL/L (ref 7–15)
AST SERPL W P-5'-P-CCNC: 22 U/L (ref 0–45)
BILIRUB SERPL-MCNC: 0.3 MG/DL
BUN SERPL-MCNC: 16 MG/DL (ref 8–23)
CALCIUM SERPL-MCNC: 9.4 MG/DL (ref 8.8–10.4)
CHLORIDE SERPL-SCNC: 106 MMOL/L (ref 98–107)
CHOLEST SERPL-MCNC: 148 MG/DL
CREAT SERPL-MCNC: 0.55 MG/DL (ref 0.51–0.95)
CREAT UR-MCNC: 134 MG/DL
EGFRCR SERPLBLD CKD-EPI 2021: >90 ML/MIN/1.73M2
ERYTHROCYTE [DISTWIDTH] IN BLOOD BY AUTOMATED COUNT: 14.5 % (ref 10–15)
EST. AVERAGE GLUCOSE BLD GHB EST-MCNC: 123 MG/DL
FASTING STATUS PATIENT QL REPORTED: YES
FASTING STATUS PATIENT QL REPORTED: YES
GLUCOSE SERPL-MCNC: 110 MG/DL (ref 70–99)
HBA1C MFR BLD: 5.9 % (ref 0–5.6)
HCO3 SERPL-SCNC: 24 MMOL/L (ref 22–29)
HCT VFR BLD AUTO: 39.7 % (ref 35–47)
HDLC SERPL-MCNC: 65 MG/DL
HGB BLD-MCNC: 12.7 G/DL (ref 11.7–15.7)
LDLC SERPL CALC-MCNC: 72 MG/DL
MCH RBC QN AUTO: 30.4 PG (ref 26.5–33)
MCHC RBC AUTO-ENTMCNC: 32 G/DL (ref 31.5–36.5)
MCV RBC AUTO: 95 FL (ref 78–100)
MICROALBUMIN UR-MCNC: <12 MG/L
MICROALBUMIN/CREAT UR: NORMAL MG/G{CREAT}
NONHDLC SERPL-MCNC: 83 MG/DL
PLATELET # BLD AUTO: 290 10E3/UL (ref 150–450)
POTASSIUM SERPL-SCNC: 4.5 MMOL/L (ref 3.4–5.3)
PROT SERPL-MCNC: 7.1 G/DL (ref 6.4–8.3)
RBC # BLD AUTO: 4.18 10E6/UL (ref 3.8–5.2)
SODIUM SERPL-SCNC: 142 MMOL/L (ref 135–145)
TRIGL SERPL-MCNC: 55 MG/DL
VIT D+METAB SERPL-MCNC: 37 NG/ML (ref 20–50)
WBC # BLD AUTO: 5.8 10E3/UL (ref 4–11)

## 2024-12-17 PROCEDURE — 90472 IMMUNIZATION ADMIN EACH ADD: CPT | Performed by: PHYSICIAN ASSISTANT

## 2024-12-17 PROCEDURE — 82570 ASSAY OF URINE CREATININE: CPT | Performed by: PHYSICIAN ASSISTANT

## 2024-12-17 PROCEDURE — 80061 LIPID PANEL: CPT | Performed by: PHYSICIAN ASSISTANT

## 2024-12-17 PROCEDURE — 36415 COLL VENOUS BLD VENIPUNCTURE: CPT | Performed by: PHYSICIAN ASSISTANT

## 2024-12-17 PROCEDURE — G0008 ADMIN INFLUENZA VIRUS VAC: HCPCS | Performed by: PHYSICIAN ASSISTANT

## 2024-12-17 PROCEDURE — 99214 OFFICE O/P EST MOD 30 MIN: CPT | Mod: 25 | Performed by: PHYSICIAN ASSISTANT

## 2024-12-17 PROCEDURE — 90662 IIV NO PRSV INCREASED AG IM: CPT | Performed by: PHYSICIAN ASSISTANT

## 2024-12-17 PROCEDURE — 90619 MENACWY-TT VACCINE IM: CPT | Performed by: PHYSICIAN ASSISTANT

## 2024-12-17 PROCEDURE — G0439 PPPS, SUBSEQ VISIT: HCPCS | Mod: 25 | Performed by: PHYSICIAN ASSISTANT

## 2024-12-17 PROCEDURE — 82043 UR ALBUMIN QUANTITATIVE: CPT | Performed by: PHYSICIAN ASSISTANT

## 2024-12-17 PROCEDURE — 82306 VITAMIN D 25 HYDROXY: CPT | Performed by: PHYSICIAN ASSISTANT

## 2024-12-17 PROCEDURE — 83036 HEMOGLOBIN GLYCOSYLATED A1C: CPT | Performed by: PHYSICIAN ASSISTANT

## 2024-12-17 PROCEDURE — 80053 COMPREHEN METABOLIC PANEL: CPT | Performed by: PHYSICIAN ASSISTANT

## 2024-12-17 PROCEDURE — 85027 COMPLETE CBC AUTOMATED: CPT | Performed by: PHYSICIAN ASSISTANT

## 2024-12-17 RX ORDER — TRIAMCINOLONE ACETONIDE 1 MG/G
CREAM TOPICAL 2 TIMES DAILY
Qty: 30 G | Refills: 1 | Status: SHIPPED | OUTPATIENT
Start: 2024-12-17

## 2024-12-17 RX ORDER — LOSARTAN POTASSIUM 25 MG/1
25 TABLET ORAL DAILY
Qty: 90 TABLET | Refills: 3 | Status: SHIPPED | OUTPATIENT
Start: 2024-12-17

## 2024-12-17 RX ORDER — ROSUVASTATIN CALCIUM 20 MG/1
20 TABLET, COATED ORAL DAILY
Qty: 90 TABLET | Refills: 3 | Status: SHIPPED | OUTPATIENT
Start: 2024-12-17

## 2024-12-17 SDOH — HEALTH STABILITY: PHYSICAL HEALTH: ON AVERAGE, HOW MANY DAYS PER WEEK DO YOU ENGAGE IN MODERATE TO STRENUOUS EXERCISE (LIKE A BRISK WALK)?: 4 DAYS

## 2024-12-17 ASSESSMENT — SOCIAL DETERMINANTS OF HEALTH (SDOH): HOW OFTEN DO YOU GET TOGETHER WITH FRIENDS OR RELATIVES?: THREE TIMES A WEEK
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
## 2024-12-17 NOTE — PATIENT INSTRUCTIONS
Patient Education   Preventive Care Advice   This is general advice given by our system to help you stay healthy. However, your care team may have specific advice just for you. Please talk to your care team about your preventive care needs.  Nutrition  Eat 5 or more servings of fruits and vegetables each day.  Try wheat bread, brown rice and whole grain pasta (instead of white bread, rice, and pasta).  Get enough calcium and vitamin D. Check the label on foods and aim for 100% of the RDA (recommended daily allowance).  Lifestyle  Exercise at least 150 minutes each week  (30 minutes a day, 5 days a week).  Do muscle strengthening activities 2 days a week. These help control your weight and prevent disease.  No smoking.  Wear sunscreen to prevent skin cancer.  Have a dental exam and cleaning every 6 months.  Yearly exams  See your health care team every year to talk about:  Any changes in your health.  Any medicines your care team has prescribed.  Preventive care, family planning, and ways to prevent chronic diseases.  Shots (vaccines)   HPV shots (up to age 26), if you've never had them before.  Hepatitis B shots (up to age 59), if you've never had them before.  COVID-19 shot: Get this shot when it's due.  Flu shot: Get a flu shot every year.  Tetanus shot: Get a tetanus shot every 10 years.  Pneumococcal, hepatitis A, and RSV shots: Ask your care team if you need these based on your risk.  Shingles shot (for age 50 and up)  General health tests  Diabetes screening:  Starting at age 35, Get screened for diabetes at least every 3 years.  If you are younger than age 35, ask your care team if you should be screened for diabetes.  Cholesterol test: At age 39, start having a cholesterol test every 5 years, or more often if advised.  Bone density scan (DEXA): At age 50, ask your care team if you should have this scan for osteoporosis (brittle bones).  Hepatitis C: Get tested at least once in your life.  STIs (sexually  transmitted infections)  Before age 24: Ask your care team if you should be screened for STIs.  After age 24: Get screened for STIs if you're at risk. You are at risk for STIs (including HIV) if:  You are sexually active with more than one person.  You don't use condoms every time.  You or a partner was diagnosed with a sexually transmitted infection.  If you are at risk for HIV, ask about PrEP medicine to prevent HIV.  Get tested for HIV at least once in your life, whether you are at risk for HIV or not.  Cancer screening tests  Cervical cancer screening: If you have a cervix, begin getting regular cervical cancer screening tests starting at age 21.  Breast cancer scan (mammogram): If you've ever had breasts, begin having regular mammograms starting at age 40. This is a scan to check for breast cancer.  Colon cancer screening: It is important to start screening for colon cancer at age 45.  Have a colonoscopy test every 10 years (or more often if you're at risk) Or, ask your provider about stool tests like a FIT test every year or Cologuard test every 3 years.  To learn more about your testing options, visit:   .  For help making a decision, visit:   https://bit.ly/fm71826.  Prostate cancer screening test: If you have a prostate, ask your care team if a prostate cancer screening test (PSA) at age 55 is right for you.  Lung cancer screening: If you are a current or former smoker ages 50 to 80, ask your care team if ongoing lung cancer screenings are right for you.  For informational purposes only. Not to replace the advice of your health care provider. Copyright   2023 [facility] [employer]. All rights reserved. Clinically reviewed by the [facility]. allyve [id number] - REV 01/24.  Bladder Training: Care Instructions  Your Care Instructions     Bladder training is used to treat urge incontinence and stress incontinence. Urge incontinence means that the need to urinate comes on so fast  that you can't get to a toilet in time. Stress incontinence means that you leak urine because of pressure on your bladder. For example, it may happen when you laugh, cough, or lift something heavy.  Bladder training can increase how long you can wait before you have to urinate. It can also help your bladder hold more urine. And it can give you better control over the urge to urinate.  It is important to remember that bladder training takes a few weeks to a few months to make a difference. You may not see results right away, but don't give up.  Follow-up care is a key part of your treatment and safety. Be sure to make and go to all appointments, and call your doctor if you are having problems. It's also a good idea to know your test results and keep a list of the medicines you take.  How can you care for yourself at home?  Work with your doctor to come up with a bladder training program that is right for you. You may use one or more of the following methods.  Delayed urination  In the beginning, try to keep from urinating for 5 minutes after you first feel the need to go.  While you wait, take deep, slow breaths to relax. Kegel exercises can also help you delay the need to go to the bathroom.  After some practice, when you can easily wait 5 minutes to urinate, try to wait 10 minutes before you urinate.  Slowly increase the waiting period until you are able to control when you have to urinate.  Scheduled urination  Empty your bladder when you first wake up in the morning.  Schedule times throughout the day when you will urinate.  Start by going to the bathroom every hour, even if you don't need to go.  Slowly increase the time between trips to the bathroom.  When you have found a schedule that works well for you, keep doing it.  If you wake up during the night and have to urinate, do it. Apply your schedule to waking hours only.  Kegel exercises  These tighten and strengthen pelvic muscles, which can help you control  "the flow of urine. (If doing these exercises causes pain, stop doing them and talk with your doctor.) To do Kegel exercises:  Squeeze your muscles as if you were trying not to pass gas. Or squeeze your muscles as if you were stopping the flow of urine. Your belly, legs, and buttocks shouldn't move.  Hold the squeeze for 3 seconds, then relax for 5 to 10 seconds.  Start with 3 seconds, then add 1 second each week until you are able to squeeze for 10 seconds.  Repeat the exercise 10 times a session. Do 3 to 8 sessions a day.  When should you call for help?  Watch closely for changes in your health, and be sure to contact your doctor if:    Your incontinence is getting worse.     You do not get better as expected.   Where can you learn more?  Go to https://www.[employer].net/patiented  Enter V684 in the search box to learn more about \"Bladder Training: Care Instructions.\"  Current as of: November 15, 2023  Content Version: 14.2 2024 [facility] [employer].   Care instructions adapted under license by your healthcare professional. If you have questions about a medical condition or this instruction, always ask your healthcare professional. [employer] disclaims any warranty or liability for your use of this information.       "

## 2024-12-18 ENCOUNTER — PATIENT OUTREACH (OUTPATIENT)
Dept: CARE COORDINATION | Facility: CLINIC | Age: 74
End: 2024-12-18
Payer: COMMERCIAL

## 2024-12-18 NOTE — RESULT ENCOUNTER NOTE
Blanquita  I have reviewed your recent test results:    -Normal red blood cell (hgb) levels, normal white blood cell count and normal platelet levels.  -Cholesterol levels are at your goal levels.  ADVISE: continuing your medication, a regular exercise program with at least 150 minutes of aerobic exercise per week, and eating a low saturated fat/low carbohydrate diet.  Also, you should recheck this fasting cholesterol panel in 12 months.  -Liver and gallbladder tests (ALT,AST, Alk phos,bilirubin) are normal.  -Kidney function (GFR) is normal.  -Sodium is normal.  -Potassium is normal.  -Calcium is normal.  -Glucose and hemoglobin A1C are slight elevated and may be a sign of early diabetes (prediabetes). ADVISE:: eating a low carbohydrate diet, exercising, trying to lose weight (if necessary) and rechecking your glucose level in 12 months.  -Vitamin D level is normal and getting 1000 IU daily in your diet or supplements is recommended.   -Microalbumin (urine protein) test is normal.  ADVISE: rechecking this annually.    For additional lab test information, www.[employer].com is an excellent reference.     If you have any questions please do not hesitate to contact our office via phone ([phone]) or MyChart.    Healthy regards,     Nasra Herron MBA, MS, PA-C  [facility]

## 2025-03-17 ENCOUNTER — VIRTUAL VISIT (OUTPATIENT)
Dept: FAMILY MEDICINE | Facility: OTHER | Age: 75
End: 2025-03-17
Payer: COMMERCIAL

## 2025-03-17 DIAGNOSIS — J40 BRONCHITIS: Primary | ICD-10-CM

## 2025-03-17 PROCEDURE — 98005 SYNCH AUDIO-VIDEO EST LOW 20: CPT | Performed by: PHYSICIAN ASSISTANT

## 2025-03-17 PROCEDURE — 1126F AMNT PAIN NOTED NONE PRSNT: CPT | Mod: 95 | Performed by: PHYSICIAN ASSISTANT

## 2025-03-17 NOTE — PROGRESS NOTES
Blanquita is a 74 year old who is being evaluated via a billable video visit.    How would you like to obtain your AVS? MyChart  If the video visit is dropped, the invitation should be resent by: Send to e-mail at: [email or web address]  Will anyone else be joining your video visit? No    Assessment & Plan     Bronchitis  Patient informs me that her daughter and grandchildren were treated with oral antibiotics for respiratory infection over this past 1-2 weeks. She has now had symptoms for 5 days. Certainly could be viral. However, in review of patient's history I see that she is s/p splenectomy which increases her susceptibility to encapsulated bacteria and has history of endometrial cancer. Given these, her age and family exposure I have opted to treat with augmentin. Reviewed renal function, normal. Educated patient on possible adverse effects. Reference material attached to the AVS.  - amoxicillin-clavulanate (AUGMENTIN) 875-125 MG tablet; Take 1 tablet by mouth 2 times daily.      Subjective   Blanquita is a 74 year old, presenting for the following health issues:  URI      3/17/2025     2:02 PM   Additional Questions   Roomed by Mary         3/17/2025     2:02 PM   Patient Reported Additional Medications   Patient reports taking the following new medications muscinex, nyquil     Video Start Time: Joined the call at 3/17/2025, 2:13:29 pm.  Left the call at 3/17/2025, 2:28:40 pm.  You were on the call for 15 minutes 11 seconds .    URI        Acute Illness  Acute illness concerns: cough, chest congestion  Onset/Duration: 4-5 days  Symptoms:  Fever: No  Chills/Sweats: YES  Headache (location?): YES- slight, not too bad  Sinus Pressure: No  Conjunctivitis:  No  Ear Pain: no  Rhinorrhea: YES  Congestion: YES  Sore Throat: No  Cough: YES-productive of yellow sputum, productive of green sputum, with slight shortness of breath  Wheeze: No  Decreased Appetite: YES  Nausea: No  Vomiting: No  Diarrhea: No  Dysuria/Freq.:  No  Dysuria or Hematuria: No  Fatigue/Achiness: YES  Sick/Strep Exposure: YES- daughter and grandchildren - daughter was seen by Albino last week given amoxicillin.   Therapies tried and outcome: muscinex, nyquil, tylenol      Review of Systems  Constitutional, HEENT, cardiovascular, pulmonary, gi and gu systems are negative, except as otherwise noted.      Objective    Vitals - Patient Reported  Pain Score: No Pain (0)        Physical Exam   GENERAL: alert and no distress  EYES: Eyes grossly normal to inspection.  No discharge or erythema, or obvious scleral/conjunctival abnormalities.  RESP: No audible wheeze, cough, or visible cyanosis.    SKIN: Visible skin clear. No significant rash, abnormal pigmentation or lesions.  NEURO: Cranial nerves grossly intact.  Mentation and speech appropriate for age.  PSYCH: Appropriate affect, tone, and pace of words     Video-Visit Details    Type of service:  Video Visit   Video End Time: Joined the call at 3/17/2025, 2:13:29 pm.  Left the call at 3/17/2025, 2:28:40 pm.  You were on the call for 15 minutes 11 seconds .  Originating Location (pt. Location): Home  Distant Location (provider location):  Off-site  Platform used for Video Visit: Chasity  Signed Electronically by: Kayode Foy PA-C

## 2025-03-18 DIAGNOSIS — J40 BRONCHITIS: Primary | ICD-10-CM

## 2025-03-18 RX ORDER — ALBUTEROL SULFATE 90 UG/1
1-2 INHALANT RESPIRATORY (INHALATION) EVERY 6 HOURS PRN
Qty: 18 G | Refills: 0 | Status: SHIPPED | OUTPATIENT
Start: 2025-03-18

## 2025-03-22 ENCOUNTER — HEALTH MAINTENANCE LETTER (OUTPATIENT)
Age: 75
End: 2025-03-22

## 2025-04-09 ENCOUNTER — E-VISIT (OUTPATIENT)
Dept: FAMILY MEDICINE | Facility: CLINIC | Age: 75
End: 2025-04-09
Payer: COMMERCIAL

## 2025-04-09 DIAGNOSIS — R30.0 DIFFICULT OR PAINFUL URINATION: Primary | ICD-10-CM

## 2025-04-09 DIAGNOSIS — B37.31 VAGINAL YEAST INFECTION: ICD-10-CM

## 2025-04-09 RX ORDER — SULFAMETHOXAZOLE AND TRIMETHOPRIM 800; 160 MG/1; MG/1
1 TABLET ORAL 2 TIMES DAILY
Qty: 6 TABLET | Refills: 0 | Status: SHIPPED | OUTPATIENT
Start: 2025-04-09 | End: 2025-04-14

## 2025-04-09 RX ORDER — FLUCONAZOLE 150 MG/1
150 TABLET ORAL ONCE
Qty: 1 TABLET | Refills: 0 | Status: SHIPPED | OUTPATIENT
Start: 2025-04-09 | End: 2025-04-09

## 2025-04-09 NOTE — PATIENT INSTRUCTIONS
Dear Blanquita,     After reviewing your responses, I would like you to come in for a urine test to make sure we treat you correctly. This urine test is to evaluate you for a possible urinary tract infection, and should be scheduled for today or tomorrow. Schedule a Lab Only appointment here.     Lab appointments are not available at most locations on the weekends. If no Lab Only appointment is available, you should be seen in any of our convenient Walk-in or Urgent Care Centers, which can be found on our website here.      I suspect that your symptoms are likely due to a bladder infection/UTIs & would recommend starting an antibiotic that I will send to your pharmacy.  Is important that you do not start this medication until after you have left the urine sample or it can interfere with the results.  I will also send over a Diflucan tablet in the case you develop yeast infection symptoms as you have historically.     If your symptoms worsen, you develop pain in your back or stomach, develop fevers, or are not improving in 5 days, please contact your primary care provider for an appointment or visit a Walk-in or Urgent Care Center to be seen.     Thanks again for choosing us as your health care partner,       Nasra Herron MBA, MS, PA-C  [facility]    Urinary Tract Infection (UTI) in Women: Care Instructions  Overview     A urinary tract infection (UTI) is an infection caused by bacteria. It can happen anywhere in the urinary tract. A UTI can happen in the:  Kidneys.  Ureters, the tubes that connect the kidneys to the bladder.  Bladder.  Urethra, where the urine comes out.  Most UTIs are bladder infections. They often cause pain or burning when you urinate.  Most UTIs can be cured with antibiotics. If you are prescribed antibiotics, be sure to complete your treatment so that the infection does not get worse.  Follow-up care is a key part of your treatment and safety. Be sure to make and go to  all appointments, and call your doctor if you are having problems. It's also a good idea to know your test results and keep a list of the medicines you take.  How can you care for yourself at home?  Take your antibiotics as directed. Do not stop taking them just because you feel better. You need to take the full course of antibiotics.  Drink extra water and other fluids for the next day or two. This will help make the urine less concentrated and help wash out the bacteria that are causing the infection. (If you have kidney, heart, or liver disease and have to limit fluids, talk with your doctor before you increase the amount of fluids you drink.)  Avoid drinks that are carbonated or have caffeine. They can irritate the bladder.  Urinate often. Try to empty your bladder each time.  To relieve pain, take a hot bath or lay a heating pad set on low over your lower belly or genital area. Never go to sleep with a heating pad in place.  To prevent UTIs  Drink plenty of water each day. This helps you urinate often, which clears bacteria from your system. (If you have kidney, heart, or liver disease and have to limit fluids, talk with your doctor before you increase the amount of fluids you drink.)  Urinate when you need to.  If you are sexually active, urinate right after you have sex.  Change sanitary pads often.  Avoid douches, bubble baths, feminine hygiene sprays, and other feminine hygiene products that have deodorants.  After going to the bathroom, wipe from front to back.  When should you call for help?   Call your doctor now or seek immediate medical care if:    You have new or worse fever, chills, nausea, or vomiting.     You have new pain in your back just below your rib cage. This is called flank pain.     There is new blood or pus in your urine.     You have any problems with your antibiotic medicine.   Watch closely for changes in your health, and be sure to contact your doctor if:    You are not getting better  "after taking an antibiotic for 2 days.     Your symptoms go away but then come back.   Where can you learn more?  Go to https://www.[employer].net/patiented  Enter K848 in the search box to learn more about \"Urinary Tract Infection (UTI) in Women: Care Instructions.\"  Current as of: April 30, 2024  Content Version: 14.4    0110-0972 [employer].   Care instructions adapted under license by your healthcare professional. If you have questions about a medical condition or this instruction, always ask your healthcare professional. [employer] disclaims any warranty or liability for your use of this information.    "

## 2025-06-23 ENCOUNTER — OFFICE VISIT (OUTPATIENT)
Dept: DERMATOLOGY | Facility: CLINIC | Age: 75
End: 2025-06-23
Attending: PHYSICIAN ASSISTANT
Payer: COMMERCIAL

## 2025-06-23 DIAGNOSIS — D18.01 CHERRY ANGIOMA: ICD-10-CM

## 2025-06-23 DIAGNOSIS — L92.0 GRANULOMA ANNULARE: ICD-10-CM

## 2025-06-23 DIAGNOSIS — Z12.83 SCREENING EXAM FOR SKIN CANCER: ICD-10-CM

## 2025-06-23 DIAGNOSIS — L30.1 DYSHIDROTIC FOOT DERMATITIS: Primary | ICD-10-CM

## 2025-06-23 DIAGNOSIS — L82.1 SK (SEBORRHEIC KERATOSIS): ICD-10-CM

## 2025-06-23 DIAGNOSIS — R21 RASH: ICD-10-CM

## 2025-06-23 DIAGNOSIS — L81.4 LENTIGINES: ICD-10-CM

## 2025-06-23 DIAGNOSIS — D22.9 MULTIPLE BENIGN NEVI: ICD-10-CM

## 2025-06-23 DIAGNOSIS — Z12.83 SCREENING FOR SKIN CANCER: ICD-10-CM

## 2025-06-23 PROCEDURE — 99204 OFFICE O/P NEW MOD 45 MIN: CPT | Performed by: PHYSICIAN ASSISTANT

## 2025-06-23 RX ORDER — IBUPROFEN 200 MG
200 TABLET ORAL EVERY 4 HOURS PRN
COMMUNITY

## 2025-06-23 RX ORDER — TRIAMCINOLONE ACETONIDE 1 MG/G
CREAM TOPICAL 2 TIMES DAILY
Qty: 80 G | Refills: 1 | Status: SHIPPED | OUTPATIENT
Start: 2025-06-23

## 2025-06-23 NOTE — PATIENT INSTRUCTIONS
Proper skin care from [facility]:    -Eliminate harsh soaps as they strip the natural oils from the skin, often resulting in dry itchy skin ( i.e. Dial, Zest, [place])  -Use mild soaps such as Cetaphil or Dove Sensitive Skin in the shower. You do not need to use soap on arms, legs, and trunk every time you shower unless visibly soiled.   -Avoid hot or cold showers.  -After showering, lightly dry off and apply moisturizing within 2-3 minutes. This will help trap moisture in the skin.   -Aggressive use of a moisturizer at least 1-2 times a day to the entire body (including -Vanicream, Cetaphil, Aquaphor or Cerave) and moisturize hands after every washing.  -We recommend using moisturizers that come in a tub that needs to be scooped out, not a pump. This has more of an oil base. It will hold moisture in your skin much better than a water base moisturizer. The above recommended are non-pore clogging.      Wear a sunscreen with at least SPF 30 on your face, ears, neck and V of the chest daily. Wear sunscreen on other areas of the body if those areas are exposed to the sun throughout the day. Sunscreens can contain physical and/or chemical blockers. Physical blockers are less likely to clog pores, these include zinc oxide and titanium dioxide. Reapply every two hour and after swimming.     Sunscreen examples: https://www.ewg.org/sunscreen/    UV radiation  UVA radiation remains constant throughout the day and throughout the year. It is a longer wavelength than UVB and therefore penetrates deeper into the skin leading to immediate and delayed tanning, photoaging, and skin cancer. 70-80% of UVA and UVB radiation occurs between the hours of 10am-2pm.  UVB radiation  UVB radiation causes the most harmful effects and is more significant during the summer months. However, snow and ice can reflect UVB radiation leading to skin damage during the winter months as well. UVB radiation is responsible for tanning,  burning, inflammation, delayed erythema (pinkness), pigmentation (brown spots), and skin cancer.     I recommend self monthly full body exams and yearly full body exams with a dermatology provider. If you develop a new or changing lesion please follow up for examination. Most skin cancers are pink and scaly or pink and pearly. However, we do see blue/brown/black skin cancers.  Consider the ABCDEs of melanoma when giving yourself your monthly full body exam ( don't forget the groin, buttocks, feet, toes, etc). A-asymmetry, B-borders, C-color, D-diameter, E-elevation or evolving. If you see any of these changes please follow up in clinic. If you cannot see your back I recommend purchasing a hand held mirror to use with a larger wall mirror.       Checking for Skin Cancer  You can find cancer early by checking your skin each month. There are 3 kinds of skin cancer. They are melanoma, basal cell carcinoma, and squamous cell carcinoma. Doing monthly skin checks is the best way to find new marks or skin changes. Follow the instructions below for checking your skin.   The ABCDEs of checking moles for melanoma   Check your moles or growths for signs of melanoma using ABCDE:   Asymmetry: the sides of the mole or growth don t match  Border: the edges are ragged, notched, or blurred  Color: the color within the mole or growth varies  Diameter: the mole or growth is larger than 6 mm (size of a pencil eraser)  Evolving: the size, shape, or color of the mole or growth is changing (evolving is not shown in the images below)    Checking for other types of skin cancer  Basal cell carcinoma or squamous cell carcinoma have symptoms such as:     A spot or mole that looks different from all other marks on your skin  Changes in how an area feels, such as itching, tenderness, or pain  Changes in the skin's surface, such as oozing, bleeding, or scaliness  A sore that does not heal  New swelling or redness beyond the border of a  mole    Who s at risk?  Anyone can get skin cancer. But you are at greater risk if you have:   Fair skin, light-colored hair, or light-colored eyes  Many moles or abnormal moles on your skin  A history of sunburns from sunlight or tanning beds  A family history of skin cancer  A history of exposure to radiation or chemicals  A weakened immune system  If you have had skin cancer in the past, you are at risk for recurring skin cancer.   How to check your skin  Do your monthly skin checkups in front of a full-length mirror. Check all parts of your body, including your:   Head (ears, face, neck, and scalp)  Torso (front, back, and sides)  Arms (tops, undersides, upper, and lower armpits)  Hands (palms, backs, and fingers, including under the nails)  Buttocks and genitals  Legs (front, back, and sides)  Feet (tops, soles, toes, including under the nails, and between toes)  If you have a lot of moles, take digital photos of them each month. Make sure to take photos both up close and from a distance. These can help you see if any moles change over time.   Most skin changes are not cancer. But if you see any changes in your skin, call your doctor right away. Only he or she can diagnose a problem. If you have skin cancer, seeing your doctor can be the first step toward getting the treatment that could save your life.   [employer] last reviewed this educational content on 4/1/2019 2000-2020 [employer]. [address] [place] [address], [place] [address]. All rights reserved. This information is not intended as a substitute for professional medical care. Always follow your healthcare professional's instructions.       When should I call my doctor?  If you are worsening or not improving, please, contact us or seek urgent care as noted below.     Who should I call with questions (adults)?    [facility] [phone]  For urgent needs outside of business hours call the [facility] at  [phone] and ask for the dermatology resident on call to be paged  If this is a medical emergency and you are unable to reach an ER, Call [phone]      If you need a prescription refill, please contact your pharmacy. Refills are approved or denied by our Physicians during normal business hours, Monday through Friday.  Per office policy, refills will not be granted if you have not been seen within the past year (or sooner depending on the condition).
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
## 2025-07-05 ENCOUNTER — HEALTH MAINTENANCE LETTER (OUTPATIENT)
Age: 75
End: 2025-07-05

## 2025-07-21 ENCOUNTER — OFFICE VISIT (OUTPATIENT)
Dept: FAMILY MEDICINE | Facility: CLINIC | Age: 75
End: 2025-07-21
Payer: COMMERCIAL

## 2025-07-21 VITALS
TEMPERATURE: 98.7 F | HEART RATE: 87 BPM | DIASTOLIC BLOOD PRESSURE: 68 MMHG | SYSTOLIC BLOOD PRESSURE: 118 MMHG | OXYGEN SATURATION: 98 % | RESPIRATION RATE: 20 BRPM

## 2025-07-21 DIAGNOSIS — R73.01 IMPAIRED FASTING GLUCOSE: Primary | ICD-10-CM

## 2025-07-21 DIAGNOSIS — R20.0 NUMBNESS OF RIGHT FOOT: ICD-10-CM

## 2025-07-21 LAB
EST. AVERAGE GLUCOSE BLD GHB EST-MCNC: 117 MG/DL
HBA1C MFR BLD: 5.7 % (ref 0–5.6)

## 2025-07-21 PROCEDURE — 99213 OFFICE O/P EST LOW 20 MIN: CPT

## 2025-07-21 PROCEDURE — 3078F DIAST BP <80 MM HG: CPT

## 2025-07-21 PROCEDURE — 3044F HG A1C LEVEL LT 7.0%: CPT

## 2025-07-21 PROCEDURE — 36415 COLL VENOUS BLD VENIPUNCTURE: CPT

## 2025-07-21 PROCEDURE — 3074F SYST BP LT 130 MM HG: CPT

## 2025-07-21 PROCEDURE — G2211 COMPLEX E/M VISIT ADD ON: HCPCS

## 2025-07-21 PROCEDURE — 83036 HEMOGLOBIN GLYCOSYLATED A1C: CPT

## 2025-07-21 NOTE — PROGRESS NOTES
"  Assessment & Plan     Impaired fasting glucose  ***  - HEMOGLOBIN A1C  - HEMOGLOBIN A1C    Numbness of right foot  ***  - EMG      BMI  Estimated body mass index is 33.58 kg/m  as calculated from the following:    Height as of 12/17/24: 1.602 m (5' 3.07\").    Weight as of 12/17/24: 86.2 kg (190 lb).   {Weight Management Plan needed for ACO:929903}    {Follow-up (Optional):866991}    Subjective   Blanquita is a 74 year old, presenting for the following health issues:  Musculoskeletal Problem (Leg and foot issue)        7/21/2025     1:44 PM   Additional Questions   Roomed by arlene orosco   Accompanied by self     Musculoskeletal Problem    History of Present Illness       Reason for visit:  Right foot numb tingling  Symptom onset:  1-3 days ago   She is taking medications regularly.        Blanquita is a 74 year old female who presents today with ***.     - 3 days ago, numbness and tingling in right lower leg. Swims for 30 min twice a week. NO injury or trauma.     Still busy- taking advil helps somewhat.     {MA/LPN/RN Pre-Provider Visit Orders- hCG/UA/Strep (Optional):000894}  Concern - right foot and leg  Onset: 3 days  Description: tingling and  numb goes up the leg and makes the patient limp  Intensity: mild  Progression of Symptoms:  same and constant  Accompanying Signs & Symptoms:   Previous history of similar problem: NO  Precipitating factors:        Worsened by: NO  Alleviating factors:        Improved by: NO  Therapies tried and outcome: None  {additonal problems for provider to add (Optional):920278}    {ROS Picklists (Optional):692065}      Objective    /68   Pulse 87   Temp 98.7  F (37.1  C) (Tympanic)   Resp 20   SpO2 98%   There is no height or weight on file to calculate BMI.  Physical Exam   {Exam List (Optional):172635}    {Diagnostic Test Results (Optional):095093}        Signed Electronically by: Renate Irvin PA-C  {Email feedback regarding this note to " [email or web address]   :003009}

## (undated) DEVICE — LUBRICANT INST ELECTROLUBE EL101

## (undated) DEVICE — SYSTEM LAPAROVUE VISIBILITY LAPVUE10

## (undated) DEVICE — DAVINCI XI DRAPE ARM 470015

## (undated) DEVICE — SU STRATAFIX PDS PLUS 0 CT-2 18" SXPP1A407

## (undated) DEVICE — SU STRATAFIX PDS PLUS 0 CT-2 9" SXPP1A446

## (undated) DEVICE — DAVINCI XI ESU FORCE BIPOLAR 8MM 471405

## (undated) DEVICE — LINEN DRAPE 54X72" 5467

## (undated) DEVICE — DRAPE MAYO STAND 23X54 8337

## (undated) DEVICE — GLOVE BIOGEL PI MICRO SZ 7.5 48575

## (undated) DEVICE — DAVINCI XI SEAL UNIVERSAL 5-8MM 470361

## (undated) DEVICE — DRAPE LAP W/ARMBOARD 29410

## (undated) DEVICE — DAVINCI HOT SHEARS TIP COVER  400180

## (undated) DEVICE — DECANTER VIAL 2006S

## (undated) DEVICE — SU VICRYL 4-0 PS-2 18" UND J496H

## (undated) DEVICE — DAVINCI XI OBTURATOR BLADELESS 8MM 470359

## (undated) DEVICE — CATH TRAY FOLEY SURESTEP 16FR DRAIN BAG STATOCK A899916

## (undated) DEVICE — PROTECTOR ARM ONE-STEP TRENDELENBURG 40418

## (undated) DEVICE — TUBING INSUFFLATION W/FILTER 10FT GS1016

## (undated) DEVICE — GLOVE BIOGEL PI MICRO SZ 8.0 48580

## (undated) DEVICE — ENDO TROCAR FIRST ENTRY KII FIOS Z-THRD 05X100MM CTF03

## (undated) DEVICE — LINEN ORTHO ACL PACK 5447

## (undated) DEVICE — SU VICRYL 2-0 SH 27" J317H

## (undated) DEVICE — SOL WATER IRRIG 1000ML BOTTLE 2F7114

## (undated) DEVICE — ESU GROUND PAD ADULT W/CORD E7507

## (undated) DEVICE — PACK MINOR CUSTOM RIDGES SBA32RMRMA

## (undated) DEVICE — PREP CHLORAPREP 26ML TINTED HI-LITE ORANGE 930815

## (undated) RX ORDER — BUPIVACAINE HYDROCHLORIDE AND EPINEPHRINE 5; 5 MG/ML; UG/ML
INJECTION, SOLUTION EPIDURAL; INTRACAUDAL; PERINEURAL
Status: DISPENSED
Start: 2023-06-12

## (undated) RX ORDER — PROPOFOL 10 MG/ML
INJECTION, EMULSION INTRAVENOUS
Status: DISPENSED
Start: 2023-06-12

## (undated) RX ORDER — DEXAMETHASONE SODIUM PHOSPHATE 4 MG/ML
INJECTION, SOLUTION INTRA-ARTICULAR; INTRALESIONAL; INTRAMUSCULAR; INTRAVENOUS; SOFT TISSUE
Status: DISPENSED
Start: 2023-06-12

## (undated) RX ORDER — LIDOCAINE HYDROCHLORIDE 10 MG/ML
INJECTION, SOLUTION EPIDURAL; INFILTRATION; INTRACAUDAL; PERINEURAL
Status: DISPENSED
Start: 2023-06-12

## (undated) RX ORDER — ACETAMINOPHEN 325 MG/1
TABLET ORAL
Status: DISPENSED
Start: 2023-06-12

## (undated) RX ORDER — GLYCOPYRROLATE 0.2 MG/ML
INJECTION INTRAMUSCULAR; INTRAVENOUS
Status: DISPENSED
Start: 2023-06-12

## (undated) RX ORDER — KETOROLAC TROMETHAMINE 15 MG/ML
INJECTION, SOLUTION INTRAMUSCULAR; INTRAVENOUS
Status: DISPENSED
Start: 2023-06-12

## (undated) RX ORDER — FENTANYL CITRATE 50 UG/ML
INJECTION, SOLUTION INTRAMUSCULAR; INTRAVENOUS
Status: DISPENSED
Start: 2023-06-12